# Patient Record
Sex: MALE | Race: WHITE | NOT HISPANIC OR LATINO | Employment: OTHER | URBAN - METROPOLITAN AREA
[De-identification: names, ages, dates, MRNs, and addresses within clinical notes are randomized per-mention and may not be internally consistent; named-entity substitution may affect disease eponyms.]

---

## 2017-04-21 ENCOUNTER — OFFICE VISIT (OUTPATIENT)
Dept: FAMILY MEDICINE | Facility: CLINIC | Age: 67
End: 2017-04-21
Payer: COMMERCIAL

## 2017-04-21 VITALS
WEIGHT: 197 LBS | HEART RATE: 64 BPM | HEIGHT: 70 IN | TEMPERATURE: 96.9 F | DIASTOLIC BLOOD PRESSURE: 70 MMHG | SYSTOLIC BLOOD PRESSURE: 124 MMHG | RESPIRATION RATE: 26 BRPM | BODY MASS INDEX: 28.2 KG/M2 | OXYGEN SATURATION: 97 %

## 2017-04-21 DIAGNOSIS — N40.1 BENIGN PROSTATIC HYPERPLASIA WITH LOWER URINARY TRACT SYMPTOMS, UNSPECIFIED MORPHOLOGY: ICD-10-CM

## 2017-04-21 DIAGNOSIS — Z12.11 COLON CANCER SCREENING: ICD-10-CM

## 2017-04-21 DIAGNOSIS — E78.5 HYPERLIPIDEMIA LDL GOAL <130: ICD-10-CM

## 2017-04-21 DIAGNOSIS — Z00.00 ROUTINE GENERAL MEDICAL EXAMINATION AT A HEALTH CARE FACILITY: Primary | ICD-10-CM

## 2017-04-21 PROCEDURE — 99386 PREV VISIT NEW AGE 40-64: CPT | Performed by: FAMILY MEDICINE

## 2017-04-21 RX ORDER — TAMSULOSIN HYDROCHLORIDE 0.4 MG/1
0.4 CAPSULE ORAL DAILY
Qty: 90 CAPSULE | Refills: 3 | Status: SHIPPED | OUTPATIENT
Start: 2017-04-21 | End: 2017-12-15

## 2017-04-21 RX ORDER — ATORVASTATIN CALCIUM 20 MG/1
20 TABLET, FILM COATED ORAL DAILY
Qty: 90 TABLET | Refills: 3 | Status: SHIPPED | OUTPATIENT
Start: 2017-04-21 | End: 2017-12-15

## 2017-04-21 NOTE — MR AVS SNAPSHOT
After Visit Summary   4/21/2017    Drake Santiago    MRN: [id number]           Patient Information     Date Of Birth          1950        Visit Information        Provider Department      4/21/2017 1:00 PM Christiano Braun MD [facility]        Today's Diagnoses     Colon cancer screening    -  1    Routine general medical examination at a health care facility        Hyperlipidemia LDL goal <130        Benign prostatic hyperplasia with lower urinary tract symptoms, unspecified morphology          Care Instructions      Preventive Health Recommendations  Male Ages 50 - 64    Yearly exam:             See your health care provider every year in order to  o   Review health changes.   o   Discuss preventive care.    o   Review your medicines if your doctor has prescribed any.     Have a cholesterol test every 5 years, or more frequently if you are at risk for high cholesterol/heart disease.     Have a diabetes test (fasting glucose) every three years. If you are at risk for diabetes, you should have this test more often.     Have a colonoscopy at age 50, or have a yearly FIT test (stool test). These exams will check for colon cancer.      Talk with your health care provider about whether or not a prostate cancer screening test (PSA) is right for you.    You should be tested each year for STDs (sexually transmitted diseases), if you re at risk.     Shots: Get a flu shot each year. Get a tetanus shot every 10 years.     Nutrition:    Eat at least 5 servings of fruits and vegetables daily.     Eat whole-grain bread, whole-wheat pasta and brown rice instead of white grains and rice.     Talk to your provider about Calcium and Vitamin D.     Lifestyle    Exercise for at least 150 minutes a week (30 minutes a day, 5 days a week). This will help you control your weight and prevent disease.     Limit alcohol to one drink per day.     No smoking.     Wear sunscreen to prevent skin cancer.  "    See your dentist every six months for an exam and cleaning.     See your eye doctor every 1 to 2 years.          Follow-ups after your visit        Future tests that were ordered for you today     Open Future Orders        Priority Expected Expires Ordered    Lipid Profile Routine  2017    Comprehensive metabolic panel (BMP + Alb, Alk Phos, ALT, AST, Total. Bili, TP) Routine  2017    Prostate spec antigen screen Routine  2017            Who to contact     If you have questions or need follow up information about today's clinic visit or your schedule please contact [facility] directly at [phone].  Normal or non-critical lab and imaging results will be communicated to you by [email or web address], letter or phone within 4 business days after the clinic has received the results. If you do not hear from us within 7 days, please contact the clinic through [email or web address] or phone. If you have a critical or abnormal lab result, we will notify you by phone as soon as possible.  Submit refill requests through [employer] or call your pharmacy and they will forward the refill request to us. Please allow 3 business days for your refill to be completed.          Additional Information About Your Visit        MyChart Information     Wet[employer]aint lets you send messages to your doctor, view your test results, renew your prescriptions, schedule appointments and more. To sign up, go to [email or web address] . Click on \"Log in\" on the left side of the screen, which will take you to the Welcome page. Then click on \"Sign up Now\" on the right side of the page.     You will be asked to enter the access code listed below, as well as some personal information. Please follow the directions to create your username and password.     Your access code is: [email or web address]  Expires: 2017  1:38 PM     Your access code will  in 90 days. If you need help or a new code, please call your [facility] " "clinic or [phone].        Care EveryWhere ID     This is your Care EveryWhere ID. This could be used by other organizations to access your [place] medical records  [id number]        Your Vitals Were     Pulse Temperature Respirations Height Pulse Oximetry BMI (Body Mass Index)    64 96.9  F (36.1  C) (Temporal) 26 5' 10.1\" (1.781 m) 97% 28.19 kg/m2       Blood Pressure from Last 3 Encounters:   04/21/17 124/70    Weight from Last 3 Encounters:   04/21/17 197 lb (89.4 kg)              We Performed the Following     Colonoscopy - HIM Scan          Today's Medication Changes          These changes are accurate as of: 4/21/17  1:38 PM.  If you have any questions, ask your nurse or doctor.               Start taking these medicines.        Dose/Directions    atorvastatin 20 MG tablet   Commonly known as:  LIPITOR   Used for:  Hyperlipidemia LDL goal <130   Started by:  Christiano Braun MD        Dose:  20 mg   Take 1 tablet (20 mg) by mouth daily   Quantity:  90 tablet   Refills:  3       tamsulosin 0.4 MG capsule   Commonly known as:  FLOMAX   Used for:  Benign prostatic hyperplasia with lower urinary tract symptoms, unspecified morphology   Started by:  Christiano Braun MD        Dose:  0.4 mg   Take 1 capsule (0.4 mg) by mouth daily   Quantity:  90 capsule   Refills:  3            Where to get your medicines      These medications were sent to [address]  [address], [facility] [address]     Phone:  [phone]     atorvastatin 20 MG tablet    tamsulosin 0.4 MG capsule                Primary Care Provider    None Specified       No primary provider on file.        Thank you!     Thank you for choosing [facility]  for your care. Our goal is always to provide you with excellent care. Hearing back from our patients is one way we can continue to improve our services. Please take a few minutes to complete the written survey that you may receive in the mail " after your visit with us. Thank you!             Your Updated Medication List - Protect others around you: Learn how to safely use, store and throw away your medicines at www.disposemymeds.org.          This list is accurate as of: 4/21/17  1:38 PM.  Always use your most recent med list.                   Brand Name Dispense Instructions for use    atorvastatin 20 MG tablet    LIPITOR    90 tablet    Take 1 tablet (20 mg) by mouth daily       tamsulosin 0.4 MG capsule    FLOMAX    90 capsule    Take 1 capsule (0.4 mg) by mouth daily

## 2017-04-21 NOTE — NURSING NOTE
"Chief Complaint   Patient presents with     Establish Care     Physical       Initial /70  Pulse 64  Temp 96.9  F (36.1  C) (Temporal)  Resp 26  Ht 5' 10.1\" (1.781 m)  Wt 197 lb (89.4 kg)  SpO2 97%  BMI 28.19 kg/m2 Estimated body mass index is 28.19 kg/(m^2) as calculated from the following:    Height as of this encounter: 5' 10.1\" (1.781 m).    Weight as of this encounter: 197 lb (89.4 kg).  Medication Reconciliation: complete    "

## 2017-04-21 NOTE — PROGRESS NOTES
SUBJECTIVE:     CC: Drake Santiago is an 66 year old male who presents for preventative health visit.     Healthy Habits:    Do you get at least three servings of calcium containing foods daily (dairy, green leafy vegetables, etc.)? No    Amount of exercise or daily activities, outside of work: 0 day(s) per week    Problems taking medications regularly No    Medication side effects: No    Have you had an eye exam in the past two years? yes    Do you see a dentist twice per year? yes    Do you have sleep apnea, excessive snoring or daytime drowsiness?no        Medication refills    Today's PHQ-2 Score: No flowsheet data found.    Abuse: Current or Past(Physical, Sexual or Emotional)- No  Do you feel safe in your environment - Yes    Social History   Substance Use Topics     Smoking status: Never Smoker     Smokeless tobacco: Never Used     Alcohol use No     The patient does not drink >3 drinks per day nor >7 drinks per week.    Last PSA: No results found for: PSA    No results for input(s): CHOL, HDL, LDL, TRIG, CHOLHDLRATIO, NHDL in the last 02199 hours.    Reviewed orders with patient. Reviewed health maintenance and updated orders accordingly - Yes    Reviewed and updated as needed this visit by clinical staff  Tobacco  Allergies  Meds  Soc Hx        Reviewed and updated as needed this visit by Provider        Past Medical History:   Diagnosis Date     BPH (benign prostatic hyperplasia)      Hyperlipidemia       Past Surgical History:   Procedure Laterality Date     HERNIORRHAPHY INGUINAL         ROS:  C: NEGATIVE for fever, chills, change in weight  I: NEGATIVE for worrisome rashes, moles or lesions  E: NEGATIVE for vision changes or irritation  ENT: NEGATIVE for ear, mouth and throat problems  R: NEGATIVE for significant cough or SOB  CV: NEGATIVE for chest pain, palpitations or peripheral edema  GI: NEGATIVE for nausea, abdominal pain, heartburn, or change in bowel habits   male: negative for dysuria,  "hematuria, decreased urinary stream, erectile dysfunction, urethral discharge  M: NEGATIVE for significant arthralgias or myalgia  N: NEGATIVE for weakness, dizziness or paresthesias  P: NEGATIVE for changes in mood or affect      OBJECTIVE:     /70  Pulse 64  Temp 96.9  F (36.1  C) (Temporal)  Resp 26  Ht 5' 10.1\" (1.781 m)  Wt 197 lb (89.4 kg)  SpO2 97%  BMI 28.19 kg/m2  EXAM:  GENERAL: healthy, alert and no distress  EYES: Eyes grossly normal to inspection, PERRL and conjunctivae and sclerae normal  HENT: ear canals and TM's normal, nose and mouth without ulcers or lesions  NECK: no adenopathy, no asymmetry, masses, or scars and thyroid normal to palpation  RESP: lungs clear to auscultation - no rales, rhonchi or wheezes  CV: regular rate and rhythm, normal S1 S2, no S3 or S4, no murmur, click or rub, no peripheral edema and peripheral pulses strong  ABDOMEN: soft, nontender, no hepatosplenomegaly, no masses and bowel sounds normal  MS: no gross musculoskeletal defects noted, no edema  SKIN: no suspicious lesions or rashes  NEURO: Normal strength and tone, mentation intact and speech normal  PSYCH: mentation appears normal, affect normal/bright    ASSESSMENT/PLAN:     1. Routine general medical examination at a health care facility  Generally healthy moved to the area recently. We are obtaining old records.    2. Hyperlipidemia LDL goal <130  He is due for lipid panel he will come in and get this in the near future fasting.  - atorvastatin (LIPITOR) 20 MG tablet; Take 1 tablet (20 mg) by mouth daily  Dispense: 90 tablet; Refill: 3  - Lipid Profile; Future  - Comprehensive metabolic panel (BMP + Alb, Alk Phos, ALT, AST, Total. Bili, TP); Future    3. Benign prostatic hyperplasia with lower urinary tract symptoms, unspecified morphology  We will follow his PSA. He is sometimes taking 2 Flomax a day and if it gets to be more he would like to see urology.  - tamsulosin (FLOMAX) 0.4 MG capsule; Take 1 " "capsule (0.4 mg) by mouth daily  Dispense: 90 capsule; Refill: 3  - Prostate spec antigen screen; Future    4. Colon cancer screening  Recently had this.  - Colonoscopy - HIM Scan    COUNSELING:  Reviewed preventive health counseling, as reflected in patient instructions       Regular exercise       Healthy diet/nutrition       Vision screening         reports that he has never smoked. He has never used smokeless tobacco.    Estimated body mass index is 28.19 kg/(m^2) as calculated from the following:    Height as of this encounter: 5' 10.1\" (1.781 m).    Weight as of this encounter: 197 lb (89.4 kg).       Counseling Resources:  ATP IV Guidelines  Pooled Cohorts Equation Calculator  FRAX Risk Assessment  ICSI Preventive Guidelines  Dietary Guidelines for Americans, 2010  USDA's MyPlate  ASA Prophylaxis  Lung CA Screening    Christiano Braun MD  [facility]  "

## 2017-04-24 DIAGNOSIS — E78.5 HYPERLIPIDEMIA LDL GOAL <130: ICD-10-CM

## 2017-04-24 DIAGNOSIS — N40.1 BENIGN PROSTATIC HYPERPLASIA WITH LOWER URINARY TRACT SYMPTOMS, UNSPECIFIED MORPHOLOGY: ICD-10-CM

## 2017-04-24 LAB
ALBUMIN SERPL-MCNC: 3.6 G/DL (ref 3.4–5)
ALP SERPL-CCNC: 79 U/L (ref 40–150)
ALT SERPL W P-5'-P-CCNC: 26 U/L (ref 0–70)
ANION GAP SERPL CALCULATED.3IONS-SCNC: 10 MMOL/L (ref 3–14)
AST SERPL W P-5'-P-CCNC: 17 U/L (ref 0–45)
BILIRUB SERPL-MCNC: 0.4 MG/DL (ref 0.2–1.3)
BUN SERPL-MCNC: 21 MG/DL (ref 7–30)
CALCIUM SERPL-MCNC: 9.1 MG/DL (ref 8.5–10.1)
CHLORIDE SERPL-SCNC: 105 MMOL/L (ref 94–109)
CHOLEST SERPL-MCNC: 130 MG/DL
CO2 SERPL-SCNC: 28 MMOL/L (ref 20–32)
CREAT SERPL-MCNC: 0.89 MG/DL (ref 0.66–1.25)
GFR SERPL CREATININE-BSD FRML MDRD: 86 ML/MIN/1.7M2
GLUCOSE SERPL-MCNC: 111 MG/DL (ref 70–99)
HDLC SERPL-MCNC: 49 MG/DL
LDLC SERPL CALC-MCNC: 60 MG/DL
NONHDLC SERPL-MCNC: 81 MG/DL
POTASSIUM SERPL-SCNC: 3.9 MMOL/L (ref 3.4–5.3)
PROT SERPL-MCNC: 7.1 G/DL (ref 6.8–8.8)
PSA SERPL-ACNC: 1.32 UG/L (ref 0–4)
SODIUM SERPL-SCNC: 143 MMOL/L (ref 133–144)
TRIGL SERPL-MCNC: 103 MG/DL

## 2017-04-24 PROCEDURE — G0103 PSA SCREENING: HCPCS | Performed by: FAMILY MEDICINE

## 2017-04-24 PROCEDURE — 80061 LIPID PANEL: CPT | Performed by: FAMILY MEDICINE

## 2017-04-24 PROCEDURE — 80053 COMPREHEN METABOLIC PANEL: CPT | Performed by: FAMILY MEDICINE

## 2017-04-24 PROCEDURE — 36415 COLL VENOUS BLD VENIPUNCTURE: CPT | Performed by: FAMILY MEDICINE

## 2017-04-24 NOTE — LETTER
[address] [address]  Phone: [phone]          May 8, 2017    Drake Jack  [address] [address] [place] [id number]          Dear Drake,      LAB RESULTS:     The results of your recent labs were NORMAL. Your blood sugar was borderline elevated. Watch the carbohydrates in your diet to help keep this number down. If you have any further questions or problems, please contact our office.          Sincerely,      Christiano Braun M.D.

## 2017-04-26 ENCOUNTER — MYC MEDICAL ADVICE (OUTPATIENT)
Dept: FAMILY MEDICINE | Facility: CLINIC | Age: 67
End: 2017-04-26

## 2017-04-27 ENCOUNTER — MYC REFILL (OUTPATIENT)
Dept: FAMILY MEDICINE | Facility: CLINIC | Age: 67
End: 2017-04-27

## 2017-04-27 DIAGNOSIS — N40.1 BENIGN PROSTATIC HYPERPLASIA WITH LOWER URINARY TRACT SYMPTOMS, UNSPECIFIED MORPHOLOGY: ICD-10-CM

## 2017-04-27 DIAGNOSIS — E78.5 HYPERLIPIDEMIA LDL GOAL <130: ICD-10-CM

## 2017-04-27 RX ORDER — TAMSULOSIN HYDROCHLORIDE 0.4 MG/1
0.4 CAPSULE ORAL DAILY
Qty: 90 CAPSULE | Refills: 3 | Status: CANCELLED | OUTPATIENT
Start: 2017-04-27

## 2017-04-27 RX ORDER — ATORVASTATIN CALCIUM 20 MG/1
20 TABLET, FILM COATED ORAL DAILY
Qty: 90 TABLET | Refills: 3 | Status: CANCELLED | OUTPATIENT
Start: 2017-04-27

## 2017-04-27 NOTE — TELEPHONE ENCOUNTER
Message from [employer]:  Original authorizing provider: Christiano Braun MD    Drake Santiago would like a refill of the following medications:  tamsulosin (FLOMAX) 0.4 MG capsule [Christiano Braun MD]  atorvastatin (LIPITOR) 20 MG tablet [Christiano Braun MD]    Preferred pharmacy: [facility] [address]    Comment:

## 2017-05-08 NOTE — PROGRESS NOTES
Cholesterol good at 130 blood sugar was borderline at 111. Remainder of chemistry panel is normal PSA was fine at 1.32

## 2017-06-05 ENCOUNTER — HOSPITAL ENCOUNTER (OUTPATIENT)
Facility: AMBULATORY SURGERY CENTER | Age: 67
End: 2017-06-05
Attending: OPHTHALMOLOGY

## 2017-06-05 ENCOUNTER — HOSPITAL ENCOUNTER (EMERGENCY)
Facility: CLINIC | Age: 67
Discharge: HOME | End: 2017-06-05
Attending: EMERGENCY MEDICINE | Admitting: EMERGENCY MEDICINE
Payer: COMMERCIAL

## 2017-06-05 ENCOUNTER — HOSPITAL ENCOUNTER (EMERGENCY)
Facility: CLINIC | Age: 67
Discharge: ANOTHER HOSPITAL | End: 2017-06-05
Attending: FAMILY MEDICINE | Admitting: FAMILY MEDICINE
Payer: COMMERCIAL

## 2017-06-05 ENCOUNTER — ANESTHESIA (OUTPATIENT)
Dept: SURGERY | Facility: AMBULATORY SURGERY CENTER | Age: 67
End: 2017-06-05

## 2017-06-05 ENCOUNTER — OFFICE VISIT (OUTPATIENT)
Dept: OPHTHALMOLOGY | Facility: CLINIC | Age: 67
End: 2017-06-05

## 2017-06-05 ENCOUNTER — SURGERY (OUTPATIENT)
Age: 67
End: 2017-06-05

## 2017-06-05 ENCOUNTER — ANESTHESIA EVENT (OUTPATIENT)
Dept: SURGERY | Facility: AMBULATORY SURGERY CENTER | Age: 67
End: 2017-06-05

## 2017-06-05 VITALS
RESPIRATION RATE: 16 BRPM | SYSTOLIC BLOOD PRESSURE: 149 MMHG | WEIGHT: 195 LBS | BODY MASS INDEX: 27.92 KG/M2 | DIASTOLIC BLOOD PRESSURE: 90 MMHG | HEART RATE: 64 BPM | TEMPERATURE: 97 F | HEIGHT: 70 IN | OXYGEN SATURATION: 99 %

## 2017-06-05 VITALS
HEIGHT: 70 IN | RESPIRATION RATE: 16 BRPM | DIASTOLIC BLOOD PRESSURE: 92 MMHG | BODY MASS INDEX: 27.92 KG/M2 | OXYGEN SATURATION: 97 % | TEMPERATURE: 97.2 F | WEIGHT: 195 LBS | HEART RATE: 53 BPM | SYSTOLIC BLOOD PRESSURE: 138 MMHG

## 2017-06-05 VITALS — BODY MASS INDEX: 27.92 KG/M2 | WEIGHT: 195 LBS | HEIGHT: 70 IN

## 2017-06-05 VITALS
DIASTOLIC BLOOD PRESSURE: 94 MMHG | OXYGEN SATURATION: 100 % | SYSTOLIC BLOOD PRESSURE: 138 MMHG | RESPIRATION RATE: 18 BRPM

## 2017-06-05 DIAGNOSIS — S01.111A EYELID LACERATION, RIGHT, INITIAL ENCOUNTER: ICD-10-CM

## 2017-06-05 DIAGNOSIS — S01.119A: Primary | ICD-10-CM
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
## 2017-06-05 DIAGNOSIS — S01.111A: ICD-10-CM
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
## 2017-06-05 DIAGNOSIS — Z98.890 POSTOPERATIVE STATE: Primary | ICD-10-CM

## 2017-06-05 PROCEDURE — 90715 TDAP VACCINE 7 YRS/> IM: CPT | Performed by: FAMILY MEDICINE

## 2017-06-05 PROCEDURE — 99283 EMERGENCY DEPT VISIT LOW MDM: CPT | Mod: Z6 | Performed by: FAMILY MEDICINE

## 2017-06-05 PROCEDURE — 99285 EMERGENCY DEPT VISIT HI MDM: CPT | Performed by: EMERGENCY MEDICINE

## 2017-06-05 PROCEDURE — 25000125 ZZHC RX 250: Performed by: FAMILY MEDICINE

## 2017-06-05 PROCEDURE — 99285 EMERGENCY DEPT VISIT HI MDM: CPT | Mod: 25 | Performed by: FAMILY MEDICINE

## 2017-06-05 PROCEDURE — 90471 IMMUNIZATION ADMIN: CPT | Performed by: FAMILY MEDICINE

## 2017-06-05 PROCEDURE — 99284 EMERGENCY DEPT VISIT MOD MDM: CPT | Mod: Z6 | Performed by: EMERGENCY MEDICINE

## 2017-06-05 DEVICE — EYE STENT LACRIMAL CRAWFORD 28-0184: Type: IMPLANTABLE DEVICE | Site: EYE | Status: FUNCTIONAL

## 2017-06-05 RX ORDER — FENTANYL CITRATE 50 UG/ML
25-50 INJECTION, SOLUTION INTRAMUSCULAR; INTRAVENOUS
Status: DISCONTINUED | OUTPATIENT
Start: 2017-06-05 | End: 2017-06-05 | Stop reason: HOSPADM

## 2017-06-05 RX ORDER — SODIUM CHLORIDE, SODIUM LACTATE, POTASSIUM CHLORIDE, CALCIUM CHLORIDE 600; 310; 30; 20 MG/100ML; MG/100ML; MG/100ML; MG/100ML
INJECTION, SOLUTION INTRAVENOUS CONTINUOUS PRN
Status: DISCONTINUED | OUTPATIENT
Start: 2017-06-05 | End: 2017-06-05

## 2017-06-05 RX ORDER — ERYTHROMYCIN 5 MG/G
1 OINTMENT OPHTHALMIC 3 TIMES DAILY
Qty: 3.5 G | Refills: 0 | Status: SHIPPED | OUTPATIENT
Start: 2017-06-05 | End: 2017-06-12

## 2017-06-05 RX ORDER — PROPOFOL 10 MG/ML
INJECTION, EMULSION INTRAVENOUS CONTINUOUS PRN
Status: DISCONTINUED | OUTPATIENT
Start: 2017-06-05 | End: 2017-06-05

## 2017-06-05 RX ORDER — HYDROCODONE BITARTRATE AND ACETAMINOPHEN 5; 325 MG/1; MG/1
1 TABLET ORAL EVERY 6 HOURS PRN
Qty: 10 TABLET | Refills: 0 | Status: SHIPPED | OUTPATIENT
Start: 2017-06-05 | End: 2019-01-03

## 2017-06-05 RX ORDER — CLINDAMYCIN HCL 150 MG
150 CAPSULE ORAL 3 TIMES DAILY
Qty: 30 CAPSULE | Refills: 0 | Status: SHIPPED | OUTPATIENT
Start: 2017-06-05 | End: 2018-08-01

## 2017-06-05 RX ORDER — TETRACAINE HYDROCHLORIDE 5 MG/ML
SOLUTION OPHTHALMIC PRN
Status: DISCONTINUED | OUTPATIENT
Start: 2017-06-05 | End: 2017-06-05 | Stop reason: HOSPADM

## 2017-06-05 RX ORDER — BUPIVACAINE HYDROCHLORIDE 5 MG/ML
INJECTION, SOLUTION PERINEURAL PRN
Status: DISCONTINUED | OUTPATIENT
Start: 2017-06-05 | End: 2017-06-05 | Stop reason: HOSPADM

## 2017-06-05 RX ORDER — LIDOCAINE 40 MG/G
CREAM TOPICAL
Status: DISCONTINUED | OUTPATIENT
Start: 2017-06-05 | End: 2017-06-05 | Stop reason: HOSPADM

## 2017-06-05 RX ORDER — SODIUM CHLORIDE, SODIUM LACTATE, POTASSIUM CHLORIDE, CALCIUM CHLORIDE 600; 310; 30; 20 MG/100ML; MG/100ML; MG/100ML; MG/100ML
INJECTION, SOLUTION INTRAVENOUS CONTINUOUS
Status: DISCONTINUED | OUTPATIENT
Start: 2017-06-05 | End: 2017-06-06 | Stop reason: HOSPADM

## 2017-06-05 RX ORDER — SODIUM CHLORIDE, SODIUM LACTATE, POTASSIUM CHLORIDE, CALCIUM CHLORIDE 600; 310; 30; 20 MG/100ML; MG/100ML; MG/100ML; MG/100ML
INJECTION, SOLUTION INTRAVENOUS CONTINUOUS
Status: DISCONTINUED | OUTPATIENT
Start: 2017-06-05 | End: 2017-06-05 | Stop reason: HOSPADM

## 2017-06-05 RX ORDER — ONDANSETRON 4 MG/1
4 TABLET, ORALLY DISINTEGRATING ORAL EVERY 30 MIN PRN
Status: DISCONTINUED | OUTPATIENT
Start: 2017-06-05 | End: 2017-06-06 | Stop reason: HOSPADM

## 2017-06-05 RX ORDER — NEOMYCIN SULFATE, POLYMYXIN B SULFATE AND DEXAMETHASONE 3.5; 10000; 1 MG/ML; [USP'U]/ML; MG/ML
1 SUSPENSION/ DROPS OPHTHALMIC 4 TIMES DAILY
Qty: 1 BOTTLE | Refills: 0 | Status: SHIPPED | OUTPATIENT
Start: 2017-06-05 | End: 2018-08-01

## 2017-06-05 RX ORDER — ERYTHROMYCIN 5 MG/G
OINTMENT OPHTHALMIC PRN
Status: DISCONTINUED | OUTPATIENT
Start: 2017-06-05 | End: 2017-06-05 | Stop reason: HOSPADM

## 2017-06-05 RX ORDER — ONDANSETRON 2 MG/ML
INJECTION INTRAMUSCULAR; INTRAVENOUS PRN
Status: DISCONTINUED | OUTPATIENT
Start: 2017-06-05 | End: 2017-06-05

## 2017-06-05 RX ORDER — HYDROCODONE BITARTRATE AND ACETAMINOPHEN 5; 325 MG/1; MG/1
1 TABLET ORAL ONCE
Status: COMPLETED | OUTPATIENT
Start: 2017-06-05 | End: 2017-06-05

## 2017-06-05 RX ORDER — NALOXONE HYDROCHLORIDE 0.4 MG/ML
.1-.4 INJECTION, SOLUTION INTRAMUSCULAR; INTRAVENOUS; SUBCUTANEOUS
Status: DISCONTINUED | OUTPATIENT
Start: 2017-06-05 | End: 2017-06-06 | Stop reason: HOSPADM

## 2017-06-05 RX ORDER — ACETAMINOPHEN 500 MG
1000 TABLET ORAL ONCE
Status: COMPLETED | OUTPATIENT
Start: 2017-06-05 | End: 2017-06-05

## 2017-06-05 RX ORDER — ONDANSETRON 2 MG/ML
4 INJECTION INTRAMUSCULAR; INTRAVENOUS EVERY 30 MIN PRN
Status: DISCONTINUED | OUTPATIENT
Start: 2017-06-05 | End: 2017-06-06 | Stop reason: HOSPADM

## 2017-06-05 RX ORDER — PROPOFOL 10 MG/ML
INJECTION, EMULSION INTRAVENOUS PRN
Status: DISCONTINUED | OUTPATIENT
Start: 2017-06-05 | End: 2017-06-05

## 2017-06-05 RX ORDER — ERYTHROMYCIN 5 MG/G
OINTMENT OPHTHALMIC ONCE
Status: DISCONTINUED | OUTPATIENT
Start: 2017-06-05 | End: 2017-06-05 | Stop reason: HOSPADM

## 2017-06-05 RX ORDER — PROPARACAINE HYDROCHLORIDE 5 MG/ML
SOLUTION/ DROPS OPHTHALMIC
Status: DISCONTINUED
Start: 2017-06-05 | End: 2017-06-05 | Stop reason: HOSPADM

## 2017-06-05 RX ORDER — ACETAMINOPHEN 325 MG/1
975 TABLET ORAL ONCE
Status: DISCONTINUED | OUTPATIENT
Start: 2017-06-05 | End: 2017-06-06 | Stop reason: HOSPADM

## 2017-06-05 RX ADMIN — Medication 1000 MG: at 12:28

## 2017-06-05 RX ADMIN — BUPIVACAINE HYDROCHLORIDE 3.25 ML: 5 INJECTION, SOLUTION PERINEURAL at 13:05

## 2017-06-05 RX ADMIN — SODIUM CHLORIDE, SODIUM LACTATE, POTASSIUM CHLORIDE, CALCIUM CHLORIDE: 600; 310; 30; 20 INJECTION, SOLUTION INTRAVENOUS at 12:15

## 2017-06-05 RX ADMIN — PROPOFOL 50 MG: 10 INJECTION, EMULSION INTRAVENOUS at 12:33

## 2017-06-05 RX ADMIN — TETRACAINE HYDROCHLORIDE 2 DROP: 5 SOLUTION OPHTHALMIC at 13:03

## 2017-06-05 RX ADMIN — ONDANSETRON 4 MG: 2 INJECTION INTRAMUSCULAR; INTRAVENOUS at 12:33

## 2017-06-05 RX ADMIN — CLOSTRIDIUM TETANI TOXOID ANTIGEN (FORMALDEHYDE INACTIVATED), CORYNEBACTERIUM DIPHTHERIAE TOXOID ANTIGEN (FORMALDEHYDE INACTIVATED), BORDETELLA PERTUSSIS TOXOID ANTIGEN (GLUTARALDEHYDE INACTIVATED), BORDETELLA PERTUSSIS FILAMENTOUS HEMAGGLUTININ ANTIGEN (FORMALDEHYDE INACTIVATED), BORDETELLA PERTUSSIS PERTACTIN ANTIGEN, AND BORDETELLA PERTUSSIS FIMBRIAE 2/3 ANTIGEN 0.5 ML: 5; 2; 2.5; 5; 3; 5 INJECTION, SUSPENSION INTRAMUSCULAR at 02:03

## 2017-06-05 RX ADMIN — PROPOFOL 40 MG: 10 INJECTION, EMULSION INTRAVENOUS at 12:48

## 2017-06-05 RX ADMIN — PROPOFOL 30 MG: 10 INJECTION, EMULSION INTRAVENOUS at 12:49

## 2017-06-05 RX ADMIN — ERYTHROMYCIN 2 INCH: 5 OINTMENT OPHTHALMIC at 13:25

## 2017-06-05 RX ADMIN — HYDROCODONE BITARTRATE AND ACETAMINOPHEN 1 TABLET: 5; 325 TABLET ORAL at 13:43

## 2017-06-05 RX ADMIN — PROPOFOL 100 MCG/KG/MIN: 10 INJECTION, EMULSION INTRAVENOUS at 12:33

## 2017-06-05 ASSESSMENT — CONF VISUAL FIELD
METHOD: COUNTING FINGERS
OS_NORMAL: 1
OD_NORMAL: 1

## 2017-06-05 ASSESSMENT — VISUAL ACUITY
OD_CC: 20/20
CORRECTION_TYPE: GLASSES
OS: 20/20
METHOD: SNELLEN - LINEAR
OS_CC: 20/15
OD: 20/25

## 2017-06-05 ASSESSMENT — SLIT LAMP EXAM - LIDS: COMMENTS: NORMAL

## 2017-06-05 ASSESSMENT — ENCOUNTER SYMPTOMS
CHEST TIGHTNESS: 0
SHORTNESS OF BREATH: 0
STRIDOR: 0
WHEEZING: 0
DIAPHORESIS: 0
CHOKING: 0
FATIGUE: 0
COUGH: 0
CHILLS: 0
FEVER: 0
UNEXPECTED WEIGHT CHANGE: 0
BACK PAIN: 0

## 2017-06-05 ASSESSMENT — EXTERNAL EXAM - LEFT EYE: OS_EXAM: NORMAL

## 2017-06-05 ASSESSMENT — TONOMETRY
IOP_METHOD: TONOPEN
OS_IOP_MMHG: 15
OD_IOP_MMHG: 14

## 2017-06-05 ASSESSMENT — CUP TO DISC RATIO: OD_RATIO: 0.35

## 2017-06-05 NOTE — ANESTHESIA CARE TRANSFER NOTE
Patient: Drake Santiago    Procedure(s):  right lower lid laceration repair, right canalicular laceration repair       - Wound Class: I-Clean    Diagnosis: Lid Laceration, right lower  Diagnosis Additional Information: No value filed.    Anesthesia Type:   MAC     Note:  Airway :Room Air  Patient transferred to:Phase II  Comments: Arrive Phase II, Stable, Airway Intact  119/77, 66,16,96,97.6  All questions answered.      Vitals: (Last set prior to Anesthesia Care Transfer)    CRNA VITALS  6/5/2017 1254 - 6/5/2017 1327      6/5/2017             Pulse: 60    SpO2: 98 %    Resp Rate (set): 10                Electronically Signed By: DEA Nowak CRNA  June 5, 2017  1:27 PM

## 2017-06-05 NOTE — ANESTHESIA PREPROCEDURE EVALUATION
Physical Exam  Normal systems: pulmonary    Airway   Mallampati: II  TM distance: >3 FB  Neck ROM: full    Dental   (+) implants    Cardiovascular   Rhythm and rate: regular      Pulmonary                     Anesthesia Plan      History & Physical Review  History and physical reviewed and following examination; no interval change.    ASA Status:  2 .    NPO Status:  > 8 hours    Plan for MAC with Intravenous induction. Reason for MAC:  Procedure to face, neck, head or breast  PONV prophylaxis:  Ondansetron (or other 5HT-3)       Postoperative Care  Postoperative pain management:  Multi-modal analgesia.      Consents  Anesthetic plan, risks, benefits and alternatives discussed with:  Patient..            Anesthesia Pre-op Note    Drake Santiago is a 66 year old male with past medical as described below is scheduled for Procedure(s):  right lower lid laceration repair, right canalicular laceration repair possible forehead laceartion repair       - Wound Class: I-Clean   - Wound Class: I-Clean    Past Medical History:   Diagnosis Date     BPH (benign prostatic hyperplasia)      Hyperlipidemia      Past Surgical History:   Procedure Laterality Date     HERNIORRHAPHY INGUINAL       Family History   Problem Relation Age of Onset     Glaucoma No family hx of      Macular Degeneration No family hx of      Social History     Social History     Marital status: Single     Spouse name: N/A     Number of children: N/A     Years of education: N/A     Occupational History     Not on file.     Social History Main Topics     Smoking status: Former Smoker     Smokeless tobacco: Never Used     Alcohol use No     Drug use: No     Sexual activity: Not Currently     Partners: Female     Other Topics Concern     Not on file     Social History Narrative     Current Outpatient Prescriptions   Medication Sig Dispense Refill     IBUPROFEN PO        DIPHENHYDRAMINE HCL PO Take 50 mg by mouth nightly as needed       erythromycin  (ROMYCIN) ophthalmic ointment Apply 1 Application to eye 3 times daily for 7 days Apply small amount to incision sites, as directed per physician instructions. 3.5 g 0     HYDROcodone-acetaminophen (NORCO) 5-325 MG per tablet Take 1 tablet by mouth every 6 hours as needed for pain Maximum of 4000 mg of acetaminophen in 24 hours. 10 tablet 0     neomycin-polymyxin-dexamethasone (MAXITROL) 3.5-24038-3.1 SUSP ophthalmic susp Place 1 drop into the right eye 4 times daily 1 Bottle 0     clindamycin (CLEOCIN) 150 MG capsule Take 1 capsule (150 mg) by mouth 3 times daily 30 capsule 0     tamsulosin (FLOMAX) 0.4 MG capsule Take 1 capsule (0.4 mg) by mouth daily 90 capsule 3     atorvastatin (LIPITOR) 20 MG tablet Take 1 tablet (20 mg) by mouth daily 90 tablet 3     Current Outpatient Prescriptions   Medication     IBUPROFEN PO     DIPHENHYDRAMINE HCL PO     erythromycin (ROMYCIN) ophthalmic ointment     HYDROcodone-acetaminophen (NORCO) 5-325 MG per tablet     neomycin-polymyxin-dexamethasone (MAXITROL) 3.5-45464-6.1 SUSP ophthalmic susp     clindamycin (CLEOCIN) 150 MG capsule     tamsulosin (FLOMAX) 0.4 MG capsule     atorvastatin (LIPITOR) 20 MG tablet     Current Facility-Administered Medications   Medication     lidocaine 1 % 1 mL     lidocaine (LMX4) kit     sodium chloride (PF) 0.9% PF flush 3 mL     sodium chloride (PF) 0.9% PF flush 3 mL     lactated ringers infusion     acetaminophen (TYLENOL) tablet 975 mg     Facility-Administered Medications Ordered in Other Encounters   Medication     lactated ringers infusion        Allergies   Allergen Reactions     Bees Anaphylaxis     Penicillins Anaphylaxis         Lab:     CBC RESULTS: No results for input(s): WBC, RBC, HGB, HCT, MCV, MCH, MCHC, RDW, PLT in the last 38714 hours.  Recent Labs   Lab Test  04/24/17   0922   NA  143   POTASSIUM  3.9   CHLORIDE  105   CO2  28   ANIONGAP  10   GLC  111*   BUN  21   CR  0.89   BERTRAND  9.1     The laboratory has evaluated your INR  and PTT and the results are as listed below.    No results found for: INR  No results found for: PTT

## 2017-06-05 NOTE — ED NOTES
Patient awoke this tonight to use restroom; while walking to restroom patient tripped over a dog and striking his face on the corner of a dresser sustaining a large laceration through the bottom right eyelid into cheek.  Patient denies LOC, Neck Pain, Nausea / Vomiting.  Patient denies visual changes - right eye.  States feels swollen.

## 2017-06-05 NOTE — ED AVS SNAPSHOT
[facility], Emergency Department    [address] [address]    Phone:  [phone]                                       Drake Santiago   MRN: [id number]    Department:  [facility], Emergency Department   Date of Visit:  6/5/2017           Patient Information     Date Of Birth          1950        Your diagnoses for this visit were:     Eyelid laceration, right, initial encounter        You were seen by Jeb Freeman MD.        Discharge Instructions       [place] [facility] [place]ulopastics appointment at 730 am.     Future Appointments        Provider Department Dept Phone Center    6/5/2017 7:30 AM Talmage J. Broadbent, MD [facility] Ophthalmology [phone] [facility]      24 Hour Appointment Hotline       To make an appointment at any [facility], call [phone]). If you don't have a family doctor or clinic, we will help you find one. [facility] clinics are conveniently located to serve the needs of you and your family.             Review of your medicines      Our records show that you are taking the medicines listed below. If these are incorrect, please call your family doctor or clinic.        Dose / Directions Last dose taken    atorvastatin 20 MG tablet   Commonly known as:  LIPITOR   Dose:  20 mg   Quantity:  90 tablet        Take 1 tablet (20 mg) by mouth daily   Refills:  3        DIPHENHYDRAMINE HCL PO   Dose:  50 mg        Take 50 mg by mouth nightly as needed   Refills:  0        IBUPROFEN PO        Refills:  0        tamsulosin 0.4 MG capsule   Commonly known as:  FLOMAX   Dose:  0.4 mg   Quantity:  90 capsule        Take 1 capsule (0.4 mg) by mouth daily   Refills:  3                Orders Needing Specimen Collection     None      Pending Results     No orders found from 6/3/2017 to 6/6/2017.            Pending Culture Results     No orders found from 6/3/2017 to 6/6/2017.            Pending Results Instructions     If  you had any lab results that were not finalized at the time of your Discharge, you can call the ED Lab Result RN at [phone]. You will be contacted by this team for any positive Lab results or changes in treatment. The nurses are available 7 days a week from 10A to 6:30P.  You can leave a message 24 hours per day and they will return your call.        Thank you for choosing [facility]       Thank you for choosing [facility] for your care. Our goal is always to provide you with excellent care. Hearing back from our patients is one way we can continue to improve our services. Please take a few minutes to complete the written survey that you may receive in the mail after you visit with us. Thank you!        [employer] Information     Back[employer] gives you secure access to your electronic health record. If you see a primary care provider, you can also send messages to your care team and make appointments. If you have questions, please call your primary care clinic.  If you do not have a primary care provider, please call [phone] and they will assist you.        Care EveryWhere ID     This is your Care EveryWhere ID. This could be used by other organizations to access your [facility] medical records  [id number]        After Visit Summary       This is your record. Keep this with you and show to your community pharmacist(s) and doctor(s) at your next visit.

## 2017-06-05 NOTE — ED PROVIDER NOTES
History     Chief Complaint   Patient presents with     Eye Injury     rt     HPI  Drake Santiago is a 66 year old male who was transferred from an outside facility with a right eyelid laceration.  He tripped on his dog and hit his head on the corner of there dresser.  He had no loss of consciousness.  He has no neck pain.  He has had no vomiting.  He has no vision changes.  He is unsure of his tetanus.  He wears glasses but was not wearing them at the time of the accident.  He does not wear contacts.       PAST MEDICAL HISTORY:   Past Medical History:   Diagnosis Date     BPH (benign prostatic hyperplasia)      Hyperlipidemia        PAST SURGICAL HISTORY:   Past Surgical History:   Procedure Laterality Date     HERNIORRHAPHY INGUINAL         FAMILY HISTORY: No family history on file.    SOCIAL HISTORY:   Social History   Substance Use Topics     Smoking status: Former Smoker     Smokeless tobacco: Never Used     Alcohol use No       Patient's Medications   New Prescriptions    No medications on file   Previous Medications    ATORVASTATIN (LIPITOR) 20 MG TABLET    Take 1 tablet (20 mg) by mouth daily    DIPHENHYDRAMINE HCL PO    Take 50 mg by mouth nightly as needed    IBUPROFEN PO        TAMSULOSIN (FLOMAX) 0.4 MG CAPSULE    Take 1 capsule (0.4 mg) by mouth daily   Modified Medications    No medications on file   Discontinued Medications    No medications on file          Allergies   Allergen Reactions     Bees Anaphylaxis     Penicillins Anaphylaxis          I have reviewed the Medications, Allergies, Past Medical and Surgical History, and Social History in the Epic system.    Review of Systems   Constitutional: Negative for chills, diaphoresis, fatigue, fever and unexpected weight change.   Respiratory: Negative for cough, choking, chest tightness, shortness of breath, wheezing and stridor.    Cardiovascular: Negative for chest pain.   Musculoskeletal: Negative for back pain.   All other systems reviewed and  "are negative.      Physical Exam   BP: (!) 147/98  Pulse: 64  Temp: 97  F (36.1  C)  Resp: 14  Height: 177.8 cm (5' 10\")  Weight: 88.5 kg (195 lb)  SpO2: 99 %  Physical Exam   Constitutional: He is oriented to person, place, and time. No distress.   HENT:   Head: Normocephalic and atraumatic.   Eyes: Conjunctivae are normal. Pupils are equal, round, and reactive to light. Right eye exhibits no discharge. Left eye exhibits no discharge.   Neck: Normal range of motion. Neck supple.   Cardiovascular: Normal rate and intact distal pulses.    Pulmonary/Chest: Effort normal. No respiratory distress.   Abdominal: Soft. There is no tenderness. There is no rebound and no guarding.   Musculoskeletal: Normal range of motion. He exhibits no edema or tenderness.   Neurological: He is alert and oriented to person, place, and time.   Skin: Skin is warm and dry. He is not diaphoretic.   4 cm irregular laceration involving the medial canthus.    Nursing note and vitals reviewed.      ED Course     ED Course     Procedures             Critical Care time:  none               Labs Ordered and Resulted from Time of ED Arrival Up to the Time of Departure from the ED - No data to display         Assessments & Plan (with Medical Decision Making)   1. Eyelid laceration    67 yo M with an eyelid laceration involving the medial canthus.  Ophthalmology consulted and it was recommended that she be seen in the oculoplastics clinic this am.  No evidence of globe injury.  Patient discharged with ophthalmology follow up.         I have reviewed the nursing notes.    I have reviewed the findings, diagnosis, plan and need for follow up with the patient.    New Prescriptions    No medications on file       Final diagnoses:   Eyelid laceration, right, initial encounter       6/5/2017   [facility], [place], EMERGENCY DEPARTMENT     Jeb Freeman MD  06/05/17 0724    "

## 2017-06-05 NOTE — OP NOTE
PREOPERATIVE DIAGNOSIS:   1.Right lower eyelid laceration involving the margin  2. Canalicular laceration right lower eyelid    POSTOPERATIVE DIAGNOSIS:   1.Right lower eyelid laceration involving the margin  2. Canalicular laceration right lower eyelid    PROCEDURE:  1. Full thickness right lower eyelid laceration repair  2. Right canalicular repair  3. SIlicone intubation of right lacrimal system    SURGEON: Al Ac M.D.    ASSISTANTS: Talmage Broadbent MD, PhD     ANESTHESIA: Monitored anesthesia care with local infiltration of a 50/50 mixture of 2% lidocaine with epinephrine and 0.5% Marcaine.     COMPLICATIONS: None.     ESTIMATED BLOOD LOSS: Less than 5 mL.     HISTORY: Drake Santiago  presented after a fall.  He tripped over his dog and hit his eye on a cabinet.  He sustained a full thickness laceration of the right lower eyelid involving the lacrimal system.    DESCRIPTION OF PROCEDURE: Drake Santiago  was brought to the operating room and placed supine on the operating table. IV sedation was given.  The wound was inspected.  The right lower eyelid sustained a laceration full thickness through the eyelid.  The lower medial canthal tendon was cut as well as the canaliculus.  There was some orbital fat exposed medially.  The upper limb of the medial canthal tendon was intact.  The lower puncta was intact. The wound was explored and the cut end of the lower canaliculus was not plainly visible.  A pigtail probe was placed through the upper puncta and was seen coming through the cut end of the canaliculus.  A vicryl suture was threaded through the pigtail stent and pulled through the upper system.  We were unable to thread a stent over the suture.  At this point a vargas tube was placed through the lower puncta and placed into the cut end of the canaliculus.  This was difficult to do in that the laceration was so medial that it was just lateral to the insertion into the sac.  The vargas was  retrieved through the nose.  It was then passed through the upper puncta and retrieved through the nose.  The lower eyelid margin was then re approximated with interrupted 5-0 vicryl sutures.  The lid was in excellent position.  The skin was then closed with deep 5-0 vicryl sutures and 6-0 plain gut sutures.  The stent was then tied and cut and released into the nose.    The patient tolerated the procedure well and  left the operating room in stable condition.     KIRT GIRALDO MD

## 2017-06-05 NOTE — MR AVS SNAPSHOT
After Visit Summary   6/5/2017    Drake Santiago    MRN: [id number]           Patient Information     Date Of Birth          1950        Visit Information        Provider Department      6/5/2017 7:30 AM Broadbent, Talmage Jay, MD [facility] Ophthalmology        Today's Diagnoses     Laceration of skin of eyelid and periocular area    -  1    Canalicular laceration, right, initial encounter           Follow-ups after your visit        Your next 10 appointments already scheduled     Jun 05, 2017   Procedure with Al Ac MD   [facility] ([facility])    [address] [place] [address]  5th Floor  [facility] [address] [phone]           Located in the Clinics and Surgery Center at [address] [place] [address].   parking is very convenient and highly recommended.  is a $6 flat rate fee.  Both  and self parkers should enter the main arrival plaza from [facility]; parking attendants will direct you based on your parking preference.              Who to contact     Please call your clinic at [phone] to:    Ask questions about your health    Make or cancel appointments    Discuss your medicines    Learn about your test results    Speak to your doctor   If you have compliments or concerns about an experience at your clinic, or if you wish to file a complaint, please contact [facility] Patient Relations at [phone] or email us at [email or web address]         Additional Information About Your Visit        MyChart Information     MyChart gives you secure access to your electronic health record. If you see a primary care provider, you can also send messages to your care team and make appointments. If you have questions, please call your primary care clinic.  If you do not have a primary care provider, please call [phone] and they will assist you.      [employer] is an  "electronic gateway that provides easy, online access to your medical records. With [employer], you can request a clinic appointment, read your test results, renew a prescription or communicate with your care team.     To access your existing account, please contact your [facility] or call [phone] for assistance.        Care EveryWhere ID     This is your Care EveryWhere ID. This could be used by other organizations to access your [facility] medical records  [id number]        Your Vitals Were     Height BMI (Body Mass Index)                1.778 m (5' 10\") 27.98 kg/m2           Blood Pressure from Last 3 Encounters:   06/05/17 149/90   06/05/17 (!) 138/94   04/21/17 124/70    Weight from Last 3 Encounters:   06/05/17 88.5 kg (195 lb)   06/05/17 88.5 kg (195 lb)   04/21/17 89.4 kg (197 lb)              We Performed the Following     External Photos OU (both eyes)     Madelin-Operative Worksheet (Plastics)        Primary Care Provider Office Phone # Fax #    Christiano Braun [phone]       [facility] [address] [place] [address]        Thank you!     Thank you for choosing [facility] OPHTHALMOLOGY  for your care. Our goal is always to provide you with excellent care. Hearing back from our patients is one way we can continue to improve our services. Please take a few minutes to complete the written survey that you may receive in the mail after your visit with us. Thank you!             Your Updated Medication List - Protect others around you: Learn how to safely use, store and throw away your medicines at www.disposemymeds.org.          This list is accurate as of: 6/5/17  9:06 AM.  Always use your most recent med list.                   Brand Name Dispense Instructions for use    atorvastatin 20 MG tablet    LIPITOR    90 tablet    Take 1 tablet (20 mg) by mouth daily       DIPHENHYDRAMINE HCL PO      Take 50 mg by mouth nightly as needed    "    IBUPROFEN PO          tamsulosin 0.4 MG capsule    FLOMAX    90 capsule    Take 1 capsule (0.4 mg) by mouth daily

## 2017-06-05 NOTE — CONSULTS
OPHTHALMOLOGY CONSULT NOTE  06/05/17, 6:30 AM    Patient: Drake Santiago  Consulted by: ED  Reason for consult: eyelid lac    HISTORY OF PRESENTING ILLNESS:     Patient is a 66 year old year old male who was transferred from OSH for right eyelid laceration. He tripped over his dog in the middle of the night and landed on a corner of a dresser. He denies any vision changes.     Review of systems were otherwise negative except for that which has been stated above.      OCULAR/MEDICAL/SURGICAL HISTORIES:     Past Ocular History:  None     Past Medical History:   Diagnosis Date     BPH (benign prostatic hyperplasia)      Hyperlipidemia        Past Surgical History:   Procedure Laterality Date     HERNIORRHAPHY INGUINAL           CURRENT MEDICATIONS:     Current Facility-Administered Medications   Medication     proparacaine (ALCAINE) 0.5 % ophthalmic solution     erythromycin (ROMYCIN) ophthalmic ointment     Current Outpatient Prescriptions   Medication     IBUPROFEN PO     DIPHENHYDRAMINE HCL PO     tamsulosin (FLOMAX) 0.4 MG capsule     atorvastatin (LIPITOR) 20 MG tablet         EXAMINATION:     VAcc : RE 20/20. (Near)  Motility: Full  Confrontational Visual Field: Full   Pupils: Equal and reactive to light and accomodation with no afferent pupillary defect.  IOP normal by palpation     External/Slit Lamp exam   RIGHT   Lids/lashes: medial lower eyelid laceration (medial to punctum, involving lid margin). Triangular piece of skin missing medially. Superficial forehead abrasion.    Conj/Sclera: White and quiet   Cornea:  Clear   Ant Chamber:  Deep and quiet   Iris: round and reactive   Lens: Clear  LEFT   Lids/lashes: No abn.    Conj/Sclera: White and quiet   Cornea:  Clear   Ant Chamber:  Deep and quiet   Iris: round and reactive   Lens:Clear    Dilated Fundus Exam  Eyes Dilated? Yes  Time:  With Phenylephrine 2.5% and Mydriacyl 1%   (Normally, dilation with the above lasts about 4-6 hours)  RIGHT:   Anterior  vitreous: clear   Media: clear   Optic nerve: normal contours and size   Cup to Disc Ratio: 0.3   Macula: flat and no pigment abnormality   Vessels: normal caliber and distribution   Retinal Periphery: no holes or tears identified  ASSESSMENT/PLAN:     Ophthalmology was consulted to evaluate Drake Santiago, who is a 66 year old male presenting with concerns for right lower eyelid laceration.     1. Right medial lower eyelid laceration:   - involving eyelid margin and cannalicular system  - will have him see Dr. Broadbent (Oculoplastics) today at 7:15AM in the [facility] for evaluation and possible repair (procedure room vs. OR).   - remain NPO for now  - s/p tetanus shot  - erythromycin ointment over lid laceration in the interim.    Please contact us with any further questions or concerns.      Jeffery Cerda MD  Ophthalmology, PGY-3   Pager [phone]

## 2017-06-05 NOTE — DISCHARGE INSTRUCTIONS
Please go to the [facility] for an ophthalmology/oculopastics appointment at 730 am.

## 2017-06-05 NOTE — IP AVS SNAPSHOT
[facility]    [address] [place] [address]    Phone:  [phone]    Fax:  [phone]                                       After Visit Summary   6/5/2017    Drake Santiago    MRN: [id number]           After Visit Summary Signature Page     I have received my discharge instructions, and my questions have been answered. I have discussed any challenges I see with this plan with the nurse or doctor.    ..........................................................................................................................................  Patient/Patient Representative Signature      ..........................................................................................................................................  Patient Representative Print Name and Relationship to Patient    ..................................................               ................................................  Date                                            Time    ..........................................................................................................................................  Reviewed by Signature/Title    ...................................................              ..............................................  Date                                                            Time

## 2017-06-05 NOTE — ED NOTES
Pt tripped on there dog and fell head first into a corner of a dresser, rt eye swelling and laceration. No LOC

## 2017-06-05 NOTE — DISCHARGE INSTRUCTIONS
[place] [facility] [place]  I spoke with Dr. Mas and Dr. Werner.  Plastic surgery will evaluate you and make plans to repair your laceration.  Do not eat or drink anything in case they end up having to sedate you or take you to the OR.  It was nice visiting with both you tonight.   I hope this heals up quickly for you.    Thank you for choosing [facility]. We appreciate the opportunity to meet your urgent medical needs. Please let us know if we could have done anything to make your stay more satisfying.    After discharge, please closely monitor for any new or worsening symptoms. Return to the Emergency Department if you develop any acute worsening signs or symptoms.    If you had lab work, cultures or imaging studies done during your stay, the final results may still be pending. We will call you if your plan of care needs to change. However, if you are not improving as expected, please follow up with your primary care provider or clinic.     Start any prescription medications that were prescribed to you and take them as directed.     Please see additional handouts that may be pertinent to your condition.

## 2017-06-05 NOTE — IP AVS SNAPSHOT
MRN:[id number]                      After Visit Summary   6/5/2017    Drake Santiago    MRN: [id number]           Thank you!     Thank you for choosing [facility] for your care. Our goal is always to provide you with excellent care. Hearing back from our patients is one way we can continue to improve our services. Please take a few minutes to complete the written survey that you may receive in the mail after you visit with us. Thank you!        Patient Information     Date Of Birth          1950        About your hospital stay     You were admitted on:  June 5, 2017 You last received care in the:  [facility]    You were discharged on:  June 5, 2017       Who to Call     For medical emergencies, please call 911.  For non-urgent questions about your medical care, please call your primary care provider or clinic, [phone]  For questions related to your surgery, please call your surgery clinic        Attending Provider     Provider Specialty    Al Ac MD Ophthalmology       Primary Care Provider Office Phone # Fax #    Christiano Braun [phone]      After Care Instructions     Discharge Medication Instructions       Do NOT take aspirin or medications containing NSAIDS for 72 hours after procedure.            Ice to affected area       Apply cold pack for 15 minutes on, 15 minutes off, for 48 hours while awake.                  Further instructions from your care team       [facility]  Home Care Following Anesthesia  For 24 hours after surgery:  1. Get plenty of rest.  A responsible adult must stay with you for at least 24 hours after you leave the surgery center.  2. Do not drive or use heavy equipment.  If you have weakness or tingling, don't drive or use heavy equipment until this feeling goes away.   3. Do not drink alcohol.   4. Avoid strenuous or risky activities.  Ask for help when climbing  stairs.  5. You may feel lightheaded.  IF so, sit for a few minutes before standing.  Have someone help you get up.   6. If you have nausea (feel sick to your stomach): Drink only clear liquids such as apple juice, ginger ale, broth or 7-Up.  Rest may also help.  Be sure to drink enough fluids.  Move to a regular diet as you feel able.   7. You may have a slight fever.  Call the doctor if your fever is over 100 F (37.7 C) (taken under the tongue) or lasts longer than 24 hours.  8. You may have a dry mouth, a sore throat, muscle aches or trouble sleeping. These should go away after 24 hours.  9. Do not make important or legal decisions.               Tips for taking pain medications  To get the best pain relief possible, remember these points:    Take pain medications as directed, before pain becomes severe.    Pain medication can upset your stomach: taking it with food may help.    Constipation is a common side effect of pain medication. Drink plenty of  fluids.    Eat foods high in fiber. Take a stool softener if recommended by your doctor or pharmacist.    Do not drink alcohol, drive or operate machinery while taking pain medications.    Ask about other ways to control pain, such as with heat, ice or relaxation.    Call a doctor for any of the followin. Signs of infection (fever, growing tenderness at the surgery site, a large amount of drainage or bleeding, severe pain, foul-smelling drainage, redness, swelling).  2. It has been over 8 to 10 hours since surgery and you are still not able to urinate (pass water).  3. Headache for over 24 hours.  4. Numbness, tingling or weakness the day after surgery (if you had spinal anesthesia).  Your doctor is:  Dr. Al Ac, Ophthalmology: [phone]                    Or dial [phone] and ask for the resident on call for:  Ophthalmology  For emergency care, call the:  [facility]:  [phone] (TTY for hearing impaired:  [phone])                  Post-operative Instructions    Ophthalmic Plastic and Reconstructive Surgery  Al Ac M.D.  Talmage Broadbent, M.D. PhD.    All instructions apply to the operated eye(s) or eyelid(s).  Wound care and personal care  ? If a patch or bandage has been placed, please leave this in place until seen by your physician. Ensure that the bandage does not get wet when you take a shower.  ? Apply ice compresses 15 minutes on 15 minutes off while awake for 2 days, then switch to warm water compresses 4 times a day until seen by your physician. For warm packs you can place a cup of dry uncooked rice in a clean cotton sock. Then place sock in microwave 30 seconds to one minute. Next place the warm sock into a plastic bag and wrap the bag with clean warm wet washcloth and place over operated eye.    ? You may shower or wash your hair the day after surgery. Do not bathe or go swimming for 1 week to prevent contamination of your wounds.  ? Do not apply make-up to the eyes or eyelids for 2 weeks after surgery.  ? Expect some swelling, bruising, black eye (even into the lower eyelids and cheeks). Also expect serum caking, crusting and discharge from the eye and/or incisions. A small amount of surface bleeding is normal for the first 48 hours.  ? Your eye(s) and eyelid(s) may be painful and tender. This is normal after surgery.  Use the pain medication as prescribed. If your pain does not improve despite the  medication, contact the office.    Contact information and follow-up  ? Return to the Eye Clinic for a follow-up appointment with your physician as  scheduled. If no appointment has been scheduled, call [phone] for an  appointment with Dr. Ac within 1 to 2 weeks from your date of surgery.  ? For severe pain, bleeding, or loss of vision, call the Eye Clinic at [phone].  After hours or on weekends and holidays, call [phone] and ask to speak with  the ophthalmologist on  call.    Activity restrictions and driving  ? Avoid heavy lifting, bending, exercise or strenuous activity for 1 week after surgery.  You may resume other activities and return to work as tolerated.  ? You may not resume driving until have you stopped using narcotic pain medications(such as Norco, Percocet, Tylenol #3).    Medications  ? Restart all your regular home medications and eye drops. If you take Plavix or  Aspirin on a regular basis, wait for 3 days after your surgery before restarting these  in order to decrease the risk of bleeding complications.  ? Avoid aspirin and aspirin-like medications (Motrin, Aleve, Ibuprofen, Reyna-  [place] etc) for 5 days to reduce the risk of bleeding. You may take Tylenol  (acetaminophen) for pain.  ? In addition to your home medications, take the following post-operative medications as prescribed by your physician.    ? Apply antibiotic ointment to all sutures three times a day, and into the operated eye(s) at night.  ? Instill eye drops 3 times a day until finished.  ? Take antibiotic capsules or tablets as directed.  ? Take 1 to 2 pain pills as needed for pain up to every 4 hours.    ? WARNING: All the prescription pain medications listed above contain Tylenol  (acetaminophen). You must not take more than 4,000 mg of acetaminophen per  24-hour period. This is equivalent to 6 tablets of Darvocet, 8 tablets of Vicodin, or  12 tablets of Norco, Percocet or Tylenol #3. If you take other over-the-counter medications  containing acetaminophen, you must take the amount of acetaminophen into  account and reduce the number of prescribed pain pills accordingly.  ? The prescribed medications may make you drowsy. You must not drive a car,  operate heavy machinery or drink alcohol while taking them.  ? The prescribed pain medications may cause constipation and nausea. Take them  with some food to prevent a stomach upset. If you continue to experience nausea,  call your physician.  "        Pending Results     No orders found from 6/3/2017 to 6/6/2017.            Admission Information     Date & Time Provider Department Dept. Phone    6/5/2017 Al Ac MD [facility] [phone]      Your Vitals Were     Blood Pressure Pulse Temperature Respirations Height Weight    131/92 53 97.7  F (36.5  C) (Oral) 16 1.778 m (5' 10\") 88.5 kg (195 lb)    Pulse Oximetry BMI (Body Mass Index)                98% 27.98 kg/m2          MyChart Information     [facility] gives you secure access to your electronic health record. If you see a primary care provider, you can also send messages to your care team and make appointments. If you have questions, please call your primary care clinic.  If you do not have a primary care provider, please call [phone] and they will assist you.      [facility] is an electronic gateway that provides easy, online access to your medical records. With [facility], you can request a clinic appointment, read your test results, renew a prescription or communicate with your care team.     To access your existing account, please contact your [facility] or call [phone] for assistance.        Care EveryWhere ID     This is your Care EveryWhere ID. This could be used by other organizations to access your [facility] medical records  [id number]           Review of your medicines      START taking        Dose / Directions    clindamycin 150 MG capsule   Commonly known as:  CLEOCIN   Used for:  Postoperative state        Dose:  150 mg   Take 1 capsule (150 mg) by mouth 3 times daily   Quantity:  30 capsule   Refills:  0       erythromycin ophthalmic ointment   Commonly known as:  ROMYCIN   Used for:  Postoperative state        Dose:  1 Application   Apply 1 Application to eye 3 times daily for 7 days Apply small amount to incision sites, as directed per physician instructions.   Quantity:  3.5 g   Refills:  0       " HYDROcodone-acetaminophen 5-325 MG per tablet   Commonly known as:  NORCO   Used for:  Postoperative state        Dose:  1 tablet   Take 1 tablet by mouth every 6 hours as needed for pain Maximum of 4000 mg of acetaminophen in 24 hours.   Quantity:  10 tablet   Refills:  0       neomycin-polymyxin-dexamethasone 3.5-24142-9.1 Susp ophthalmic susp   Commonly known as:  MAXITROL   Used for:  Postoperative state        Dose:  1 drop   Place 1 drop into the right eye 4 times daily   Quantity:  1 Bottle   Refills:  0         CONTINUE these medicines which have NOT CHANGED        Dose / Directions    atorvastatin 20 MG tablet   Commonly known as:  LIPITOR   Used for:  Hyperlipidemia LDL goal <130        Dose:  20 mg   Take 1 tablet (20 mg) by mouth daily   Quantity:  90 tablet   Refills:  3       DIPHENHYDRAMINE HCL PO        Dose:  50 mg   Take 50 mg by mouth nightly as needed   Refills:  0       IBUPROFEN PO        Refills:  0       tamsulosin 0.4 MG capsule   Commonly known as:  FLOMAX   Used for:  Benign prostatic hyperplasia with lower urinary tract symptoms, unspecified morphology        Dose:  0.4 mg   Take 1 capsule (0.4 mg) by mouth daily   Quantity:  90 capsule   Refills:  3            Where to get your medicines      These medications were sent to [facility] [address] [address]    Hours:  TRANSPLANT PHONE NUMBER [phone] Phone:  [phone]     clindamycin 150 MG capsule    erythromycin ophthalmic ointment    neomycin-polymyxin-dexamethasone 3.5-96258-4.1 Susp ophthalmic susp         Some of these will need a paper prescription and others can be bought over the counter. Ask your nurse if you have questions.     Bring a paper prescription for each of these medications     HYDROcodone-acetaminophen 5-325 MG per tablet                Protect others around you: Learn how to safely use, store and throw away your  medicines at www.disposemymeds.org.             Medication List: This is a list of all your medications and when to take them. Check marks below indicate your daily home schedule. Keep this list as a reference.      Medications           Morning Afternoon Evening Bedtime As Needed    atorvastatin 20 MG tablet   Commonly known as:  LIPITOR   Take 1 tablet (20 mg) by mouth daily                                clindamycin 150 MG capsule   Commonly known as:  CLEOCIN   Take 1 capsule (150 mg) by mouth 3 times daily                                DIPHENHYDRAMINE HCL PO   Take 50 mg by mouth nightly as needed                                erythromycin ophthalmic ointment   Commonly known as:  ROMYCIN   Apply 1 Application to eye 3 times daily for 7 days Apply small amount to incision sites, as directed per physician instructions.                                HYDROcodone-acetaminophen 5-325 MG per tablet   Commonly known as:  NORCO   Take 1 tablet by mouth every 6 hours as needed for pain Maximum of 4000 mg of acetaminophen in 24 hours.                                IBUPROFEN PO                                neomycin-polymyxin-dexamethasone 3.5-74761-0.1 Susp ophthalmic susp   Commonly known as:  MAXITROL   Place 1 drop into the right eye 4 times daily                                tamsulosin 0.4 MG capsule   Commonly known as:  FLOMAX   Take 1 capsule (0.4 mg) by mouth daily

## 2017-06-05 NOTE — BRIEF OP NOTE
[facility] Brief Operative Note    Pre-operative diagnosis: Lid Laceration, right lower   Post-operative diagnosis Same   Procedure: Procedure(s):  right lower lid laceration repair, right canalicular laceration repair possible forehead laceartion repair       - Wound Class: I-Clean   - Wound Class: I-Clean   Surgeon: Al Ac M.D.    Assistants(s): Talmage Broadbent, M.D. PhD,    Estimated blood loss: Less than 10 mL   Specimens: None   Findings: As expected

## 2017-06-05 NOTE — ED AVS SNAPSHOT
[facility], [place], Emergency Department    [address] [address]    Phone:  [phone]                                       Drake Santiago   MRN: [id number]    Department:  [facility], Emergency Department   Date of Visit:  6/5/2017           After Visit Summary Signature Page     I have received my discharge instructions, and my questions have been answered. I have discussed any challenges I see with this plan with the nurse or doctor.    ..........................................................................................................................................  Patient/Patient Representative Signature      ..........................................................................................................................................  Patient Representative Print Name and Relationship to Patient    ..................................................               ................................................  Date                                            Time    ..........................................................................................................................................  Reviewed by Signature/Title    ...................................................              ..............................................  Date                                                            Time

## 2017-06-05 NOTE — ANESTHESIA POSTPROCEDURE EVALUATION
Patient: Drake Santiago    Procedure(s):  right lower lid laceration repair, right canalicular laceration repair       - Wound Class: I-Clean    Diagnosis:Lid Laceration, right lower  Diagnosis Additional Information: No value filed.    Anesthesia Type:  MAC    Note:  Anesthesia Post Evaluation    Patient location during evaluation: PACU  Level of consciousness: awake and alert  Pain management: adequate  Airway patency: patent  Cardiovascular status: blood pressure returned to baseline and hemodynamically unstable  Respiratory status: acceptable  Hydration status: euvolemic  PONV: none             Last vitals:  Vitals:    06/05/17 0958 06/05/17 1325   BP: (!) 131/92 119/77   Pulse: 53    Resp: 16 16   Temp: 36.5  C (97.7  F) 36.4  C (97.6  F)   SpO2: 98% 94%         Electronically Signed By: David Fisher MD  June 5, 2017  1:48 PM

## 2017-06-05 NOTE — DISCHARGE INSTRUCTIONS
[facility]  Home Care Following Anesthesia  For 24 hours after surgery:  1. Get plenty of rest.  A responsible adult must stay with you for at least 24 hours after you leave the surgery center.  2. Do not drive or use heavy equipment.  If you have weakness or tingling, don't drive or use heavy equipment until this feeling goes away.   3. Do not drink alcohol.   4. Avoid strenuous or risky activities.  Ask for help when climbing stairs.  5. You may feel lightheaded.  IF so, sit for a few minutes before standing.  Have someone help you get up.   6. If you have nausea (feel sick to your stomach): Drink only clear liquids such as apple juice, ginger ale, broth or 7-Up.  Rest may also help.  Be sure to drink enough fluids.  Move to a regular diet as you feel able.   7. You may have a slight fever.  Call the doctor if your fever is over 100 F (37.7 C) (taken under the tongue) or lasts longer than 24 hours.  8. You may have a dry mouth, a sore throat, muscle aches or trouble sleeping. These should go away after 24 hours.  9. Do not make important or legal decisions.               Tips for taking pain medications  To get the best pain relief possible, remember these points:    Take pain medications as directed, before pain becomes severe.    Pain medication can upset your stomach: taking it with food may help.    Constipation is a common side effect of pain medication. Drink plenty of  fluids.    Eat foods high in fiber. Take a stool softener if recommended by your doctor or pharmacist.    Do not drink alcohol, drive or operate machinery while taking pain medications.    Ask about other ways to control pain, such as with heat, ice or relaxation.    Call a doctor for any of the followin. Signs of infection (fever, growing tenderness at the surgery site, a large amount of drainage or bleeding, severe pain, foul-smelling drainage, redness, swelling).  2. It has been over 8 to 10 hours  since surgery and you are still not able to urinate (pass water).  3. Headache for over 24 hours.  4. Numbness, tingling or weakness the day after surgery (if you had spinal anesthesia).  Your doctor is:  Dr. Al Ac, Ophthalmology: [phone]                    Or dial [phone] and ask for the resident on call for:  Ophthalmology  For emergency care, call the:  [facility]:  [phone] (TTY for hearing impaired: [phone])                  Post-operative Instructions    Ophthalmic Plastic and Reconstructive Surgery  Al Ac M.D.  Talmage Broadbent, M.D. PhD.    All instructions apply to the operated eye(s) or eyelid(s).  Wound care and personal care  ? If a patch or bandage has been placed, please leave this in place until seen by your physician. Ensure that the bandage does not get wet when you take a shower.  ? Apply ice compresses 15 minutes on 15 minutes off while awake for 2 days, then switch to warm water compresses 4 times a day until seen by your physician. For warm packs you can place a cup of dry uncooked rice in a clean cotton sock. Then place sock in microwave 30 seconds to one minute. Next place the warm sock into a plastic bag and wrap the bag with clean warm wet washcloth and place over operated eye.    ? You may shower or wash your hair the day after surgery. Do not bathe or go swimming for 1 week to prevent contamination of your wounds.  ? Do not apply make-up to the eyes or eyelids for 2 weeks after surgery.  ? Expect some swelling, bruising, black eye (even into the lower eyelids and cheeks). Also expect serum caking, crusting and discharge from the eye and/or incisions. A small amount of surface bleeding is normal for the first 48 hours.  ? Your eye(s) and eyelid(s) may be painful and tender. This is normal after surgery.  Use the pain medication as prescribed. If your pain does not improve despite the  medication, contact the office.    Contact  information and follow-up  ? Return to the Eye Clinic for a follow-up appointment with your physician as  scheduled. If no appointment has been scheduled, call [phone] for an  appointment with Dr. Ac within 1 to 2 weeks from your date of surgery.  ? For severe pain, bleeding, or loss of vision, call the Eye Clinic at [phone].  After hours or on weekends and holidays, call [phone] and ask to speak with  the ophthalmologist on call.    Activity restrictions and driving  ? Avoid heavy lifting, bending, exercise or strenuous activity for 1 week after surgery.  You may resume other activities and return to work as tolerated.  ? You may not resume driving until have you stopped using narcotic pain medications(such as Norco, Percocet, Tylenol #3).    Medications  ? Restart all your regular home medications and eye drops. If you take Plavix or  Aspirin on a regular basis, wait for 3 days after your surgery before restarting these  in order to decrease the risk of bleeding complications.  ? Avoid aspirin and aspirin-like medications (Motrin, Aleve, Ibuprofen, Reyna-  [place] etc) for 5 days to reduce the risk of bleeding. You may take Tylenol  (acetaminophen) for pain.  ? In addition to your home medications, take the following post-operative medications as prescribed by your physician.    ? Apply antibiotic ointment to all sutures three times a day, and into the operated eye(s) at night.  ? Instill eye drops 3 times a day until finished.  ? Take antibiotic capsules or tablets as directed.  ? Take 1 to 2 pain pills as needed for pain up to every 4 hours.    ? WARNING: All the prescription pain medications listed above contain Tylenol  (acetaminophen). You must not take more than 4,000 mg of acetaminophen per  24-hour period. This is equivalent to 6 tablets of Darvocet, 8 tablets of Vicodin, or  12 tablets of Norco, Percocet or Tylenol #3. If you take other over-the-counter medications  containing  acetaminophen, you must take the amount of acetaminophen into  account and reduce the number of prescribed pain pills accordingly.  ? The prescribed medications may make you drowsy. You must not drive a car,  operate heavy machinery or drink alcohol while taking them.  ? The prescribed pain medications may cause constipation and nausea. Take them  with some food to prevent a stomach upset. If you continue to experience nausea,  call your physician.

## 2017-06-05 NOTE — ED PROVIDER NOTES
History     Chief Complaint   Patient presents with     Facial Laceration     HPI  Drake Santiago is a 66 year old male who presents to the ED tonight with a laceration under the right eye.  He got up to go to the bathroom and tripped over the dog usually doesn't sleep in his bedroom.  He fell forward and his face on the corner of a dresser sustaining a laceration to the right lower eyelid.  He denies any loss of consciousness.  He can see with his glasses.  No neck pain.  No nausea or vomiting.  Has a couple of superficial abrasions on the right forehead that do not require repair.  He is uncertain of his last tetanus.    I have reviewed the Medications, Allergies, Past Medical and Surgical History, and Social History in the Epic system.    Review of Systems    Physical Exam   BP: (!) 138/94  Heart Rate: 78  Resp: 18  SpO2: 100 %  Physical Exam   Constitutional: He is oriented to person, place, and time. He appears well-developed and well-nourished. He appears distressed (mild).   HENT:   2 abrasions on the right forehead that do not require repair.   Eyes: EOM are normal. Pupils are equal, round, and reactive to light.   Slit lamp exam:       The right eye shows no hyphema.   Neck: Normal range of motion. Neck supple.   Pulmonary/Chest: Effort normal. No respiratory distress.   Neurological: He is alert and oriented to person, place, and time. No cranial nerve deficit.   Skin: Skin is warm and dry.   Psychiatric: He has a normal mood and affect.               ED Course    The laceration involves the tear duct and will require closure by plastic surgery.      0131: Call placed to the [facility] .  Dr. Werner from plastic surgery will be calling me back.      1:36 AM:  Dr. Werner called back quickly and I discussed the case with her.  We will send him down to the [facility] emergency department ([place]) and the plastics fellow will evaluate him.  Decision to repair in the ED vs the  OR vs having oculoplastics involved can be made then.  He can go by private car.      He declined any pain medication in the ED.      His tetanus was updated tonight.         ED Course     Procedures              Assessments & Plan (with Medical Decision Making)    (S01.111A) Eyelid laceration, right, initial encounter  Comment: lower lid, involving the medial canthus/tear duct  Plan: See discussion above.    Transfer by private car to the [facility] emergency Department were plastic surgery will meet him and make plans for repair.            I have reviewed the nursing notes.    I have reviewed the findings, diagnosis, plan and need for follow up with the patient.    New Prescriptions    No medications on file       Final diagnoses:   Eyelid laceration, right, initial encounter - lower lid, involving the medial canthus/tear duct       6/5/2017   [facility] EMERGENCY DEPARTMENT     Tyrell Chambers MD  06/05/17 0154

## 2017-06-05 NOTE — NURSING NOTE
Chief Complaints and History of Present Illnesses   Patient presents with     Consult For     LLL lacereration     Follow Up For     ED     HPI    Affected eye(s):  Left   Symptoms:     Blurred vision (Comment: due to dilation drops per pt)   No floaters   No flashes   No redness   No foreign body sensation   No tearing   No itching   No burning   No eye discharge         Do you have eye pain now?:  No      Comments:  Patient notes he woke up in the middle of the night, tripped over the dog, tripped and hit a dresser    Faiza [facility] June 5, 2017 7:52 AM

## 2017-09-27 DIAGNOSIS — Z53.9 ERRONEOUS ENCOUNTER--DISREGARD: Primary | ICD-10-CM

## 2017-12-15 ENCOUNTER — OFFICE VISIT (OUTPATIENT)
Dept: FAMILY MEDICINE | Facility: CLINIC | Age: 67
End: 2017-12-15
Payer: MEDICARE

## 2017-12-15 VITALS
TEMPERATURE: 97.4 F | HEIGHT: 70 IN | OXYGEN SATURATION: 98 % | HEART RATE: 82 BPM | BODY MASS INDEX: 28.33 KG/M2 | WEIGHT: 197.9 LBS | DIASTOLIC BLOOD PRESSURE: 70 MMHG | SYSTOLIC BLOOD PRESSURE: 120 MMHG

## 2017-12-15 DIAGNOSIS — Z23 NEED FOR PROPHYLACTIC VACCINATION AND INOCULATION AGAINST INFLUENZA: ICD-10-CM

## 2017-12-15 DIAGNOSIS — N40.1 BENIGN PROSTATIC HYPERPLASIA WITH LOWER URINARY TRACT SYMPTOMS, SYMPTOM DETAILS UNSPECIFIED: ICD-10-CM

## 2017-12-15 DIAGNOSIS — E55.9 VITAMIN D DEFICIENCY: ICD-10-CM

## 2017-12-15 DIAGNOSIS — E78.5 HYPERLIPIDEMIA LDL GOAL <130: ICD-10-CM

## 2017-12-15 DIAGNOSIS — R42 DIZZINESS: Primary | ICD-10-CM

## 2017-12-15 DIAGNOSIS — H90.6 MIXED CONDUCTIVE AND SENSORINEURAL HEARING LOSS OF BOTH EARS: ICD-10-CM

## 2017-12-15 DIAGNOSIS — R53.83 FATIGUE, UNSPECIFIED TYPE: ICD-10-CM

## 2017-12-15 DIAGNOSIS — G47.00 INSOMNIA, UNSPECIFIED TYPE: ICD-10-CM

## 2017-12-15 LAB
ALBUMIN SERPL-MCNC: 3.8 G/DL (ref 3.4–5)
ALP SERPL-CCNC: 73 U/L (ref 40–150)
ALT SERPL W P-5'-P-CCNC: 35 U/L (ref 0–70)
ANION GAP SERPL CALCULATED.3IONS-SCNC: 6 MMOL/L (ref 3–14)
AST SERPL W P-5'-P-CCNC: 22 U/L (ref 0–45)
BASOPHILS # BLD AUTO: 0 10E9/L (ref 0–0.2)
BASOPHILS NFR BLD AUTO: 0.6 %
BILIRUB SERPL-MCNC: 0.5 MG/DL (ref 0.2–1.3)
BUN SERPL-MCNC: 20 MG/DL (ref 7–30)
CALCIUM SERPL-MCNC: 8.7 MG/DL (ref 8.5–10.1)
CHLORIDE SERPL-SCNC: 105 MMOL/L (ref 94–109)
CO2 SERPL-SCNC: 30 MMOL/L (ref 20–32)
CREAT SERPL-MCNC: 1.02 MG/DL (ref 0.66–1.25)
CRP SERPL-MCNC: <2.9 MG/L (ref 0–8)
DEPRECATED CALCIDIOL+CALCIFEROL SERPL-MC: 33 UG/L (ref 20–75)
DIFFERENTIAL METHOD BLD: NORMAL
EOSINOPHIL # BLD AUTO: 0.2 10E9/L (ref 0–0.7)
EOSINOPHIL NFR BLD AUTO: 3.4 %
ERYTHROCYTE [DISTWIDTH] IN BLOOD BY AUTOMATED COUNT: 14 % (ref 10–15)
ERYTHROCYTE [SEDIMENTATION RATE] IN BLOOD BY WESTERGREN METHOD: 7 MM/H (ref 0–20)
GFR SERPL CREATININE-BSD FRML MDRD: 73 ML/MIN/1.7M2
GLUCOSE SERPL-MCNC: 100 MG/DL (ref 70–99)
HCT VFR BLD AUTO: 45.7 % (ref 40–53)
HGB BLD-MCNC: 14.6 G/DL (ref 13.3–17.7)
IMM GRANULOCYTES # BLD: 0 10E9/L (ref 0–0.4)
IMM GRANULOCYTES NFR BLD: 0.2 %
LYMPHOCYTES # BLD AUTO: 1.4 10E9/L (ref 0.8–5.3)
LYMPHOCYTES NFR BLD AUTO: 28.7 %
MCH RBC QN AUTO: 29.6 PG (ref 26.5–33)
MCHC RBC AUTO-ENTMCNC: 31.9 G/DL (ref 31.5–36.5)
MCV RBC AUTO: 93 FL (ref 78–100)
MONOCYTES # BLD AUTO: 0.5 10E9/L (ref 0–1.3)
MONOCYTES NFR BLD AUTO: 9.4 %
NEUTROPHILS # BLD AUTO: 2.9 10E9/L (ref 1.6–8.3)
NEUTROPHILS NFR BLD AUTO: 57.7 %
PLATELET # BLD AUTO: 219 10E9/L (ref 150–450)
POTASSIUM SERPL-SCNC: 3.9 MMOL/L (ref 3.4–5.3)
PROT SERPL-MCNC: 7.2 G/DL (ref 6.8–8.8)
RBC # BLD AUTO: 4.94 10E12/L (ref 4.4–5.9)
SODIUM SERPL-SCNC: 141 MMOL/L (ref 133–144)
TSH SERPL DL<=0.005 MIU/L-ACNC: 1.3 MU/L (ref 0.4–4)
VIT B12 SERPL-MCNC: 228 PG/ML (ref 193–986)
WBC # BLD AUTO: 5 10E9/L (ref 4–11)

## 2017-12-15 PROCEDURE — 82306 VITAMIN D 25 HYDROXY: CPT | Performed by: FAMILY MEDICINE

## 2017-12-15 PROCEDURE — 84443 ASSAY THYROID STIM HORMONE: CPT | Performed by: FAMILY MEDICINE

## 2017-12-15 PROCEDURE — 86140 C-REACTIVE PROTEIN: CPT | Performed by: FAMILY MEDICINE

## 2017-12-15 PROCEDURE — G0008 ADMIN INFLUENZA VIRUS VAC: HCPCS | Performed by: FAMILY MEDICINE

## 2017-12-15 PROCEDURE — 36415 COLL VENOUS BLD VENIPUNCTURE: CPT | Performed by: FAMILY MEDICINE

## 2017-12-15 PROCEDURE — 85652 RBC SED RATE AUTOMATED: CPT | Performed by: FAMILY MEDICINE

## 2017-12-15 PROCEDURE — 82607 VITAMIN B-12: CPT | Performed by: FAMILY MEDICINE

## 2017-12-15 PROCEDURE — 90662 IIV NO PRSV INCREASED AG IM: CPT | Performed by: FAMILY MEDICINE

## 2017-12-15 PROCEDURE — 80053 COMPREHEN METABOLIC PANEL: CPT | Performed by: FAMILY MEDICINE

## 2017-12-15 PROCEDURE — 99214 OFFICE O/P EST MOD 30 MIN: CPT | Mod: 25 | Performed by: FAMILY MEDICINE

## 2017-12-15 PROCEDURE — 85025 COMPLETE CBC W/AUTO DIFF WBC: CPT | Performed by: FAMILY MEDICINE

## 2017-12-15 RX ORDER — TAMSULOSIN HYDROCHLORIDE 0.4 MG/1
0.4 CAPSULE ORAL 2 TIMES DAILY
Qty: 180 CAPSULE | Refills: 1 | Status: SHIPPED | OUTPATIENT
Start: 2017-12-15 | End: 2018-06-19

## 2017-12-15 RX ORDER — TRAZODONE HYDROCHLORIDE 100 MG/1
100 TABLET ORAL
Qty: 90 TABLET | Refills: 1 | Status: SHIPPED | OUTPATIENT
Start: 2017-12-15 | End: 2018-06-19

## 2017-12-15 RX ORDER — ATORVASTATIN CALCIUM 20 MG/1
20 TABLET, FILM COATED ORAL DAILY
Qty: 90 TABLET | Refills: 3 | Status: SHIPPED | OUTPATIENT
Start: 2017-12-15 | End: 2018-12-26

## 2017-12-15 RX ORDER — TRAZODONE HYDROCHLORIDE 100 MG/1
100 TABLET ORAL AT BEDTIME
COMMUNITY
End: 2018-08-01

## 2017-12-15 NOTE — MR AVS SNAPSHOT
After Visit Summary   12/15/2017    Drake Santiago    MRN: [id number]           Patient Information     Date Of Birth          1950        Visit Information        Provider Department      12/15/2017 7:00 AM Christiano Braun MD [facility]        Today's Diagnoses     Dizziness    -  1    Fatigue, unspecified type        Insomnia, unspecified type        Benign prostatic hyperplasia with lower urinary tract symptoms, symptom details unspecified        Hyperlipidemia LDL goal <130        Vitamin D deficiency        Mixed conductive and sensorineural hearing loss of both ears        Need for prophylactic vaccination and inoculation against influenza           Follow-ups after your visit        Who to contact     If you have questions or need follow up information about today's clinic visit or your schedule please contact [facility] directly at [phone].  Normal or non-critical lab and imaging results will be communicated to you by ShepHertzhart, letter or phone within 4 business days after the clinic has received the results. If you do not hear from us within 7 days, please contact the clinic through Questrat or phone. If you have a critical or abnormal lab result, we will notify you by phone as soon as possible.  Submit refill requests through Belly or call your pharmacy and they will forward the refill request to us. Please allow 3 business days for your refill to be completed.          Additional Information About Your Visit        ShepHertzharAugure Information     Belly gives you secure access to your electronic health record. If you see a primary care provider, you can also send messages to your care team and make appointments. If you have questions, please call your primary care clinic.  If you do not have a primary care provider, please call [phone] and they will assist you.        Care EveryWhere ID     This is your Care EveryWhere ID. This could be used by  "other organizations to access your [facility] medical records  [id number]        Your Vitals Were     Pulse Temperature Height Pulse Oximetry BMI (Body Mass Index)       82 97.4  F (36.3  C) (Temporal) 5' 10\" (1.778 m) 98% 28.4 kg/m2        Blood Pressure from Last 3 Encounters:   12/15/17 120/70   06/05/17 (!) 138/92   06/05/17 149/90    Weight from Last 3 Encounters:   12/15/17 197 lb 14.4 oz (89.8 kg)   06/05/17 195 lb (88.5 kg)   06/05/17 195 lb (88.5 kg)              We Performed the Following     CBC with platelets differential     Comprehensive metabolic panel (BMP + Alb, Alk Phos, ALT, AST, Total. Bili, TP)     CRP, inflammation     ESR: Erythrocyte sedimentation rate     FLU VACCINE, INCREASED ANTIGEN, PRESV FREE, AGE 65+ [78502]     TSH with free T4 reflex     Vaccine Administration, Initial [88573]     Vitamin B12     Vitamin D Deficiency          Today's Medication Changes          These changes are accurate as of: 12/15/17  9:17 AM.  If you have any questions, ask your nurse or doctor.               These medicines have changed or have updated prescriptions.        Dose/Directions    * traZODone 100 MG tablet   Commonly known as:  DESYREL   This may have changed:  Another medication with the same name was added. Make sure you understand how and when to take each.   Changed by:  Christiano Braun MD        Dose:  100 mg   Take 100 mg by mouth At Bedtime   Refills:  0       * traZODone 100 MG tablet   Commonly known as:  DESYREL   This may have changed:  You were already taking a medication with the same name, and this prescription was added. Make sure you understand how and when to take each.   Used for:  Insomnia, unspecified type   Changed by:  Christiano Braun MD        Dose:  100 mg   Take 1 tablet (100 mg) by mouth nightly as needed for sleep   Quantity:  90 tablet   Refills:  1       * Notice:  This list has 2 medication(s) that are the same as other medications prescribed for you. Read the " directions carefully, and ask your doctor or other care provider to review them with you.         Where to get your medicines      These medications were sent to [facility] - [place], [place] - [address]  [address], [facility] [address]     Phone:  [phone]     atorvastatin 20 MG tablet    tamsulosin 0.4 MG capsule    traZODone 100 MG tablet                Primary Care Provider Office Phone # Fax #    Christiano Braun [phone]       [facility] [address] [place] [address]        Equal Access to Services     [facility]: Hadii aad ku hadasho Soomaali, waaxda luqadaha, qaybta kaalmada adeegyada, waxay idiin haykimberlyn vivi hays . So [facility] [phone].    ATENCIÓN: Si habla español, tiene a quintana disposición servicios gratuitos de asistencia lingüística. [facility] [phone].    We comply with applicable federal civil rights laws and [place] laws. We do not discriminate on the basis of race, color, national origin, age, disability, sex, sexual orientation, or gender identity.            Thank you!     Thank you for choosing [facility]  for your care. Our goal is always to provide you with excellent care. Hearing back from our patients is one way we can continue to improve our services. Please take a few minutes to complete the written survey that you may receive in the mail after your visit with us. Thank you!             Your Updated Medication List - Protect others around you: Learn how to safely use, store and throw away your medicines at www.disposemymeds.org.          This list is accurate as of: 12/15/17  9:17 AM.  Always use your most recent med list.                   Brand Name Dispense Instructions for use Diagnosis    atorvastatin 20 MG tablet    LIPITOR    90 tablet    Take 1 tablet (20 mg) by mouth daily    Hyperlipidemia LDL goal <130       clindamycin 150 MG capsule    CLEOCIN    30 capsule    Take 1 capsule (150 mg) by  mouth 3 times daily    Postoperative state       DIPHENHYDRAMINE HCL PO      Take 50 mg by mouth nightly as needed        HYDROcodone-acetaminophen 5-325 MG per tablet    NORCO    10 tablet    Take 1 tablet by mouth every 6 hours as needed for pain Maximum of 4000 mg of acetaminophen in 24 hours.    Postoperative state       IBUPROFEN PO           neomycin-polymyxin-dexamethasone 3.5-42520-6.1 Susp ophthalmic susp    MAXITROL    1 Bottle    Place 1 drop into the right eye 4 times daily    Postoperative state       tamsulosin 0.4 MG capsule    FLOMAX    180 capsule    Take 1 capsule (0.4 mg) by mouth 2 times daily    Benign prostatic hyperplasia with lower urinary tract symptoms, symptom details unspecified       * traZODone 100 MG tablet    DESYREL     Take 100 mg by mouth At Bedtime        * traZODone 100 MG tablet    DESYREL    90 tablet    Take 1 tablet (100 mg) by mouth nightly as needed for sleep    Insomnia, unspecified type       * Notice:  This list has 2 medication(s) that are the same as other medications prescribed for you. Read the directions carefully, and ask your doctor or other care provider to review them with you.

## 2017-12-15 NOTE — NURSING NOTE
"Chief Complaint   Patient presents with     Recheck Medication     Flomax and lipitor        Initial /70 (BP Location: Right arm, Patient Position: Chair, Cuff Size: Adult Large)  Pulse 82  Temp 97.4  F (36.3  C) (Temporal)  Ht 5' 10\" (1.778 m)  Wt 197 lb 14.4 oz (89.8 kg)  SpO2 98%  BMI 28.4 kg/m2 Estimated body mass index is 28.4 kg/(m^2) as calculated from the following:    Height as of this encounter: 5' 10\" (1.778 m).    Weight as of this encounter: 197 lb 14.4 oz (89.8 kg).  Medication Reconciliation: complete    "

## 2017-12-15 NOTE — PROGRESS NOTES
Injectable Influenza Immunization Documentation    1.  Is the person to be vaccinated sick today?   No    2. Does the person to be vaccinated have an allergy to a component   of the vaccine?   No  Egg Allergy Algorithm Link    3. Has the person to be vaccinated ever had a serious reaction   to influenza vaccine in the past?   No    4. Has the person to be vaccinated ever had Guillain-Barré syndrome?   No    Form completed by Roseann ROPER CMA   Prior to injection verified patient identity using patient's name and date of birth.

## 2017-12-15 NOTE — PROGRESS NOTES
SUBJECTIVE:   Drake Santiago is a 67 year old male who presents to clinic today for the following health issues: Several complaints.    Patient picked up trazodone from [place] and really likes it. Would like a prescription for it.     Patient c/o low energy and c/o weight gain. Would like to discuss this. No appetite.     Patient also c/o being dizzy and feeling drunk.  He had an MRI done in [place] but nothing was found.     Medication Followup of Flomax and Lipitor     Taking Medication as prescribed: yes    Side Effects:  None    Medication Helping Symptoms:  yes             Problem list and histories reviewed & adjusted, as indicated.  Additional history: as documented        Reviewed and updated as needed this visit by clinical staff     Reviewed and updated as needed this visit by Provider        SUBJECTIVE:  Drake  is a 67 year old male who presents for:  A list of problems. When he was down in [place] he had vertigo very bad. They did an MRI and found nothing. He complains of low energy. Weight changes. No appetite. Dizzy feels like he is drunk all the time. Not related to starting the trazodone. He is just on Flomax and Lipitor and has been for a while. Denies any rashes. Denies any night sweats. Denies any cough. No head injuries. No headache. Does have a hearing deficit.    Past Medical History:   Diagnosis Date     BPH (benign prostatic hyperplasia)      Hyperlipidemia      Past Surgical History:   Procedure Laterality Date     HERNIORRHAPHY INGUINAL       REPAIR LACERATION LID Right 6/5/2017    Procedure: REPAIR LACERATION LID;  right lower lid laceration repair, right canalicular laceration repair      ;  Surgeon: Al Ac MD;  Location:  OR     Social History   Substance Use Topics     Smoking status: Former Smoker     Smokeless tobacco: Never Used     Alcohol use Yes      Comment: glass of wine nightly      Current Outpatient Prescriptions   Medication Sig Dispense Refill     traZODone  "(DESYREL) 100 MG tablet Take 100 mg by mouth At Bedtime       atorvastatin (LIPITOR) 20 MG tablet Take 1 tablet (20 mg) by mouth daily 90 tablet 3     tamsulosin (FLOMAX) 0.4 MG capsule Take 1 capsule (0.4 mg) by mouth 2 times daily 180 capsule 1     traZODone (DESYREL) 100 MG tablet Take 1 tablet (100 mg) by mouth nightly as needed for sleep 90 tablet 1     IBUPROFEN PO        DIPHENHYDRAMINE HCL PO Take 50 mg by mouth nightly as needed       HYDROcodone-acetaminophen (NORCO) 5-325 MG per tablet Take 1 tablet by mouth every 6 hours as needed for pain Maximum of 4000 mg of acetaminophen in 24 hours. (Patient not taking: Reported on 12/15/2017) 10 tablet 0     neomycin-polymyxin-dexamethasone (MAXITROL) 3.5-21635-7.1 SUSP ophthalmic susp Place 1 drop into the right eye 4 times daily (Patient not taking: Reported on 12/15/2017) 1 Bottle 0     clindamycin (CLEOCIN) 150 MG capsule Take 1 capsule (150 mg) by mouth 3 times daily (Patient not taking: Reported on 12/15/2017) 30 capsule 0     [DISCONTINUED] atorvastatin (LIPITOR) 20 MG tablet Take 1 tablet (20 mg) by mouth daily 90 tablet 3       REVIEW OF SYSTEMS:   5 point ROS negative except as noted above in HPI, including Gen., Resp, CV, GI &  system review.     OBJECTIVE:  Vitals: /70 (BP Location: Right arm, Patient Position: Chair, Cuff Size: Adult Large)  Pulse 82  Temp 97.4  F (36.3  C) (Temporal)  Ht 5' 10\" (1.778 m)  Wt 197 lb 14.4 oz (89.8 kg)  SpO2 98%  BMI 28.4 kg/m2  BMI= Body mass index is 28.4 kg/(m^2).  He is pleasant alert and oriented. Head is normocephalic. Eyes PERRLA EOMs full no nice taking this. Ears are clear. Throat clear. Neck supple no carotid bruits. No thyromegaly. Lungs are clear. Heart regular rhythm no murmur. Face is symmetrical. DTRs are 2 over 5 at the knees bilaterally and equal. His Romberg is very unsteady. Heel to toe is very unsteady.    ASSESSMENT:  #1 dizziness more like ataxia #2 fatigue #3 insomnia #4 " hyperlipidemia #5 benign prostatic hypertrophy #6 hearing loss    PLAN:  We will check a host of blood tests including B12 and vitamin D TSH CBC chemistry panel CRP ESR. We'll set him up for a neurology consult. Reordered his medication Flomax for him for urinary frequency for him.  He ordered Lipitor for hyperlipidemia also wants an audiology consult.        Christiano Braun MD  [facility]

## 2017-12-22 ENCOUNTER — TELEPHONE (OUTPATIENT)
Dept: FAMILY MEDICINE | Facility: CLINIC | Age: 67
End: 2017-12-22

## 2017-12-22 NOTE — TELEPHONE ENCOUNTER
----- Message from Christiano Braun MD sent at 12/22/2017 12:56 PM CST -----  All of your labs were fine.  Vitamin D level was normal.  B12 level was normal.  Thyroid test was normal.  2 tests for inflammation were negative.  Chemistry panel was fine .  This includes liver function kidney function tests electrolytes and blood sugar which was 100 blood count and hemoglobin were normal

## 2017-12-22 NOTE — PROGRESS NOTES
All of your labs were fine.  Vitamin D level was normal.  B12 level was normal.  Thyroid test was normal.  2 tests for inflammation were negative.  Chemistry panel was fine .  This includes liver function kidney function tests electrolytes and blood sugar which was 100 blood count and hemoglobin were normal

## 2017-12-26 NOTE — TELEPHONE ENCOUNTER
Second attempt to reach pt. Left message. [employer] message sent to pt to advise of lab results.

## 2017-12-26 NOTE — TELEPHONE ENCOUNTER
Patient called back and info was given.  Thank you,  Sonam Oviedo   for [facility]

## 2018-01-30 ENCOUNTER — TELEPHONE (OUTPATIENT)
Dept: FAMILY MEDICINE | Facility: CLINIC | Age: 68
End: 2018-01-30

## 2018-01-30 NOTE — TELEPHONE ENCOUNTER
Reason for Call:  Other     Detailed comments: Kelsey from [facility] called.  She had spoke with Drake in December and he wanted to wait a month.  She called him again on 1/17/2018.  She states she feels he does not really want an appointment and is actually apalled that he was referred without his previous MRI that was to be requested.  Kelsey is asking to talk to someone from your team regarding this.    Phone Number Patient can be reached at: Other phone number:  [phone] ext [phone]*    Best Time: any    Can we leave a detailed message on this number? YES    Call taken on 1/30/2018 at 6:49 AM by Louis Carlson

## 2018-01-30 NOTE — TELEPHONE ENCOUNTER
Drake just wanted the MRI sent to Kelsey at [facility].  This has been done.

## 2018-03-27 ENCOUNTER — TRANSFERRED RECORDS (OUTPATIENT)
Dept: HEALTH INFORMATION MANAGEMENT | Facility: CLINIC | Age: 68
End: 2018-03-27

## 2018-03-27 ENCOUNTER — OFFICE VISIT (OUTPATIENT)
Dept: AUDIOLOGY | Facility: CLINIC | Age: 68
End: 2018-03-27
Payer: MEDICARE

## 2018-03-27 ENCOUNTER — OFFICE VISIT (OUTPATIENT)
Dept: OTOLARYNGOLOGY | Facility: CLINIC | Age: 68
End: 2018-03-27
Payer: MEDICARE

## 2018-03-27 DIAGNOSIS — T16.1XXA FOREIGN BODY OF RIGHT EAR, INITIAL ENCOUNTER: Primary | ICD-10-CM

## 2018-03-27 DIAGNOSIS — Z01.118 ABNORMAL OTOSCOPIC EXAM OF RIGHT EAR: Primary | ICD-10-CM

## 2018-03-27 PROCEDURE — V5299 HEARING SERVICE: HCPCS | Performed by: AUDIOLOGIST

## 2018-03-27 PROCEDURE — 69200 CLEAR OUTER EAR CANAL: CPT | Performed by: OTOLARYNGOLOGY

## 2018-03-27 PROCEDURE — 99203 OFFICE O/P NEW LOW 30 MIN: CPT | Mod: 25 | Performed by: OTOLARYNGOLOGY

## 2018-03-27 NOTE — LETTER
3/27/2018         RE: Drake Santiago  [address] [facility] [address]        Dear Colleague,    Thank you for referring your patient, Drake Santiago, to the [facility]. Please see a copy of my visit note below.    ENT Consultation    Drake Santiago is a 67 year old male who is seen in consultation at the request of audiology.      History of Present Illness - Drake Santiago is a 67 year old male feels like he has a plugging sensation in the right ear.  He feels that in the last couple days the insert of his hearing aid that he has been wearing from for a while got stuck in the ear canal.  Also even prior to the ear felt kind of plugged.  Left ear does not bother him.  He does wear hearing aids in both ears.  With long-standing standing history of hearing loss    Past Medical History -   Past Medical History:   Diagnosis Date     BPH (benign prostatic hyperplasia)      Hyperlipidemia        Current Medications -   Current Outpatient Prescriptions:      traZODone (DESYREL) 100 MG tablet, Take 100 mg by mouth At Bedtime, Disp: , Rfl:      atorvastatin (LIPITOR) 20 MG tablet, Take 1 tablet (20 mg) by mouth daily, Disp: 90 tablet, Rfl: 3     tamsulosin (FLOMAX) 0.4 MG capsule, Take 1 capsule (0.4 mg) by mouth 2 times daily, Disp: 180 capsule, Rfl: 1     traZODone (DESYREL) 100 MG tablet, Take 1 tablet (100 mg) by mouth nightly as needed for sleep, Disp: 90 tablet, Rfl: 1     IBUPROFEN PO, , Disp: , Rfl:      DIPHENHYDRAMINE HCL PO, Take 50 mg by mouth nightly as needed, Disp: , Rfl:      HYDROcodone-acetaminophen (NORCO) 5-325 MG per tablet, Take 1 tablet by mouth every 6 hours as needed for pain Maximum of 4000 mg of acetaminophen in 24 hours. (Patient not taking: Reported on 12/15/2017), Disp: 10 tablet, Rfl: 0     neomycin-polymyxin-dexamethasone (MAXITROL) 3.5-28250-3.1 SUSP ophthalmic susp, Place 1 drop into the right eye 4 times daily (Patient not taking: Reported on 12/15/2017),  Disp: 1 Bottle, Rfl: 0     clindamycin (CLEOCIN) 150 MG capsule, Take 1 capsule (150 mg) by mouth 3 times daily (Patient not taking: Reported on 12/15/2017), Disp: 30 capsule, Rfl: 0    Allergies -   Allergies   Allergen Reactions     Bees Anaphylaxis     Penicillins Anaphylaxis       Social History -   Social History     Social History     Marital status: Single     Spouse name: N/A     Number of children: N/A     Years of education: N/A     Social History Main Topics     Smoking status: Former Smoker     Smokeless tobacco: Never Used     Alcohol use Yes      Comment: glass of wine nightly      Drug use: No     Sexual activity: Not Currently     Partners: Female     Other Topics Concern     Not on file     Social History Narrative       Family History -   Family History   Problem Relation Age of Onset     Glaucoma No family hx of      Macular Degeneration No family hx of        Review of Systems - As per HPI and PMHx, otherwise review of system review of the head and neck negative.    Physical Exam  There were no vitals taken for this visit.  BMI: There is no height or weight on file to calculate BMI.    General - The patient is well nourished and well developed, and appears to have good nutritional status.  Alert and oriented to person and place, answers questions and cooperates with examination appropriately.    SKIN - No suspicious lesions or rashes.  Respiration - No respiratory distress.     Head and Face - Normocephalic and atraumatic, with no gross asymmetry noted of the contour of the facial features.  The facial nerve is intact, with strong symmetric movements.    Voice and Breathing - The patient was breathing comfortably without the use of accessory muscles. There was no wheezing, stridor, or stertor.  The patients voice was clear and strong, and had appropriate pitch and quality.    Ears - Bilateral pinna and EACs with normal appearing overlying skin.  Left tympanic membrane intact with good mobility on  pneumatic otoscopy bilaterally. Bony landmarks of the ossicular chain are normal.  On the right only a large amount of cerumen was appreciated deep in the canal.    No fluid or purulence was seen in the external canal or the middle ear.     Eyes - Extraocular movements intact.  Sclera were not icteric or injected, conjunctiva were pink and moist.      Nose - External contour is symmetric, no gross deflection or scars.  Nasal mucosa is pink and moist with no abnormal mucus.  The septum was midline and non-obstructive, turbinates of normal size and position.  No polyps, masses, or purulence noted on examination.    Neuro - Nonfocal neuro exam is normal, CN 2 through 12 intact, normal gait and muscle tone.    Performed in clinic today:  On the right ear I used the small alligator forceps to remove hearing insert from the mid canal.  Once this was removed appreciate significant amount of cerumen and attempts to touch cerumen produced a lot of discomfort.          A/P - Drake Santiago is a 67 year old male with foreign body in the right external ear canals with significant cerumen impaction.  Foreign body was removed with cerumen could not be removed.  Patient is advised to use hydrogen peroxide and water for couple weeks to see if he can liquefy cerumen he will see his back in a few weeks .      Joseph Perkins MD    Again, thank you for allowing me to participate in the care of your patient.        Sincerely,        Joseph Perkins MD, MD

## 2018-03-27 NOTE — MR AVS SNAPSHOT
After Visit Summary   3/27/2018    rDake Santiago    MRN: [id number]           Patient Information     Date Of Birth          1950        Visit Information        Provider Department      3/27/2018 10:30 AM Joseph Perkins MD [facility]        Today's Diagnoses     Foreign body of right ear, initial encounter    -  1       Follow-ups after your visit        Who to contact     If you have questions or need follow up information about today's clinic visit or your schedule please contact [facility] directly at [phone].  Normal or non-critical lab and imaging results will be communicated to you by [employer], letter or phone within 4 business days after the clinic has received the results. If you do not hear from us within 7 days, please contact the clinic through [employer] TV[employer] or phone. If you have a critical or abnormal lab result, we will notify you by phone as soon as possible.  Submit refill requests through [employer] or call your pharmacy and they will forward the refill request to us. Please allow 3 business days for your refill to be completed.          Additional Information About Your Visit        MyChart Information     [employer] gives you secure access to your electronic health record. If you see a primary care provider, you can also send messages to your care team and make appointments. If you have questions, please call your primary care clinic.  If you do not have a primary care provider, please call [phone] and they will assist you.        Care EveryWhere ID     This is your Care EveryWhere ID. This could be used by other organizations to access your [place] medical records  [id number]         Blood Pressure from Last 3 Encounters:   12/15/17 120/70   06/05/17 (!) 138/92   06/05/17 149/90    Weight from Last 3 Encounters:   12/15/17 89.8 kg (197 lb 14.4 oz)   06/05/17 88.5 kg (195 lb)   06/05/17 88.5 kg (195 lb)              We Performed the  Following     REMOVE FB, EXT AUDITORY CANAL        Primary Care Provider Office Phone # Fax #    Christiano Braun [phone]       [address] [facility] [address]        Equal Access to Services     MARYANNE TILLMAN : Hadana elizabeth ku katerino Socarlaali, waaxda luqadaha, qaybta kaalmada adeegyada, rangel tompkinsn vivi ravi lis root. So [facility] [phone].    ATENCIÓN: Si habla español, tiene a quintana disposición servicios gratuitos de asistencia lingüística. Llame al [phone].    We comply with applicable federal civil rights laws and [place] laws. We do not discriminate on the basis of race, color, national origin, age, disability, sex, sexual orientation, or gender identity.            Thank you!     Thank you for choosing [facility]  for your care. Our goal is always to provide you with excellent care. Hearing back from our patients is one way we can continue to improve our services. Please take a few minutes to complete the written survey that you may receive in the mail after your visit with us. Thank you!             Your Updated Medication List - Protect others around you: Learn how to safely use, store and throw away your medicines at www.disposemymeds.org.          This list is accurate as of 3/27/18 12:12 PM.  Always use your most recent med list.                   Brand Name Dispense Instructions for use Diagnosis    atorvastatin 20 MG tablet    LIPITOR    90 tablet    Take 1 tablet (20 mg) by mouth daily    Hyperlipidemia LDL goal <130       clindamycin 150 MG capsule    CLEOCIN    30 capsule    Take 1 capsule (150 mg) by mouth 3 times daily    Postoperative state       DIPHENHYDRAMINE HCL PO      Take 50 mg by mouth nightly as needed        HYDROcodone-acetaminophen 5-325 MG per tablet    NORCO    10 tablet    Take 1 tablet by mouth every 6 hours as needed for pain Maximum of 4000 mg of acetaminophen in 24 hours.    Postoperative state       IBUPROFEN PO            neomycin-polymyxin-dexamethasone 3.5-59791-5.1 Susp ophthalmic susp    MAXITROL    1 Bottle    Place 1 drop into the right eye 4 times daily    Postoperative state       tamsulosin 0.4 MG capsule    FLOMAX    180 capsule    Take 1 capsule (0.4 mg) by mouth 2 times daily    Benign prostatic hyperplasia with lower urinary tract symptoms, symptom details unspecified       * traZODone 100 MG tablet    DESYREL     Take 100 mg by mouth At Bedtime        * traZODone 100 MG tablet    DESYREL    90 tablet    Take 1 tablet (100 mg) by mouth nightly as needed for sleep    Insomnia, unspecified type       * Notice:  This list has 2 medication(s) that are the same as other medications prescribed for you. Read the directions carefully, and ask your doctor or other care provider to review them with you.

## 2018-03-27 NOTE — MR AVS SNAPSHOT
After Visit Summary   3/27/2018    Drake Santiago    MRN: [id number]           Patient Information     Date Of Birth          1950        Visit Information        Provider Department      3/27/2018 9:30 AM Beatriz Fisher [facility]        Today's Diagnoses     Abnormal otoscopic exam of right ear    -  1       Follow-ups after your visit        Who to contact     If you have questions or need follow up information about today's clinic visit or your schedule please contact [facility] directly at [phone].  Normal or non-critical lab and imaging results will be communicated to you by [email or web address], letter or phone within 4 business days after the clinic has received the results. If you do not hear from us within 7 days, please contact the clinic through [employer] or phone. If you have a critical or abnormal lab result, we will notify you by phone as soon as possible.  Submit refill requests through [employer] or call your pharmacy and they will forward the refill request to us. Please allow 3 business days for your refill to be completed.          Additional Information About Your Visit        MyChart Information     [employer] gives you secure access to your electronic health record. If you see a primary care provider, you can also send messages to your care team and make appointments. If you have questions, please call your primary care clinic.  If you do not have a primary care provider, please call [phone] and they will assist you.        Care EveryWhere ID     This is your Care EveryWhere ID. This could be used by other organizations to access your [place] medical records  [id number]         Blood Pressure from Last 3 Encounters:   12/15/17 120/70   06/05/17 (!) 138/92   06/05/17 149/90    Weight from Last 3 Encounters:   12/15/17 197 lb 14.4 oz (89.8 kg)   06/05/17 195 lb (88.5 kg)   06/05/17 195 lb (88.5 kg)              We Performed the Following      HEARING AID CHECK/NO CHARGE        Primary Care Provider Office Phone # Fax #    Christiano Braun [phone]       [address] [facility] [address]        Equal Access to Services     MARYANNE TILLMAN : Jb escobedo katerino Socarlaali, waaxda luqadaha, qaybta kaalmada adeegyada, rangel ravi lis root. So [facility] [phone].    ATENCIÓN: Si habla español, tiene a quintana disposición servicios gratuitos de asistencia lingüística. [facility] [phone].    We comply with applicable federal civil rights laws and [place] laws. We do not discriminate on the basis of race, color, national origin, age, disability, sex, sexual orientation, or gender identity.            Thank you!     Thank you for choosing [facility]  for your care. Our goal is always to provide you with excellent care. Hearing back from our patients is one way we can continue to improve our services. Please take a few minutes to complete the written survey that you may receive in the mail after your visit with us. Thank you!             Your Updated Medication List - Protect others around you: Learn how to safely use, store and throw away your medicines at www.disposemymeds.org.          This list is accurate as of 3/27/18 10:59 AM.  Always use your most recent med list.                   Brand Name Dispense Instructions for use Diagnosis    atorvastatin 20 MG tablet    LIPITOR    90 tablet    Take 1 tablet (20 mg) by mouth daily    Hyperlipidemia LDL goal <130       clindamycin 150 MG capsule    CLEOCIN    30 capsule    Take 1 capsule (150 mg) by mouth 3 times daily    Postoperative state       DIPHENHYDRAMINE HCL PO      Take 50 mg by mouth nightly as needed        HYDROcodone-acetaminophen 5-325 MG per tablet    NORCO    10 tablet    Take 1 tablet by mouth every 6 hours as needed for pain Maximum of 4000 mg of acetaminophen in 24 hours.    Postoperative state       IBUPROFEN PO            neomycin-polymyxin-dexamethasone 3.5-93420-4.1 Susp ophthalmic susp    MAXITROL    1 Bottle    Place 1 drop into the right eye 4 times daily    Postoperative state       tamsulosin 0.4 MG capsule    FLOMAX    180 capsule    Take 1 capsule (0.4 mg) by mouth 2 times daily    Benign prostatic hyperplasia with lower urinary tract symptoms, symptom details unspecified       * traZODone 100 MG tablet    DESYREL     Take 100 mg by mouth At Bedtime        * traZODone 100 MG tablet    DESYREL    90 tablet    Take 1 tablet (100 mg) by mouth nightly as needed for sleep    Insomnia, unspecified type       * Notice:  This list has 2 medication(s) that are the same as other medications prescribed for you. Read the directions carefully, and ask your doctor or other care provider to review them with you.

## 2018-03-27 NOTE — PROGRESS NOTES
AUDIOLOGY REPORT    SUBJECTIVE:  Drake Santiago is a 67 year old male who was seen in the Audiology Clinic at the [facility] for audiologic evaluation, referred by Dr. JUDSON Braun patient reports a concern regarding decreased hearing and missing conversation in quiet but especially in group situations. The patient denies bilateral tinnitus.  The patient has a neurology consult today for vertigo/dysequilibrium.  The patient notes difficulty with communication in a variety of listening situations.  They were accompanied today by their self.    OBJECTIVE:  Otoscopic exam indicates foreign body ( in the ear hearing aid dome)  in the right ear left ear clear of cerumen and tympanic membrane visible.    Attempted removal of the the dome from the ear using a lighted curette and alligator/forcepts removal could not be completed.    ASSESSMENT:     Foreign body right ear.    PLAN:  It is recommended that the patient see ENT for removal, an appointment was added for the patient later this morning.  This evaluation was rescheduled.  Please call this clinic with questions regarding these results or recommendations.        Andi Geronimo.  [place] Licensed Audiologist #[id number]

## 2018-03-27 NOTE — PROGRESS NOTES
ENT Consultation    Drake Santiago is a 67 year old male who is seen in consultation at the request of audiology.      History of Present Illness - Drake Santiago is a 67 year old male feels like he has a plugging sensation in the right ear.  He feels that in the last couple days the insert of his hearing aid that he has been wearing from for a while got stuck in the ear canal.  Also even prior to the ear felt kind of plugged.  Left ear does not bother him.  He does wear hearing aids in both ears.  With long-standing standing history of hearing loss    Past Medical History -   Past Medical History:   Diagnosis Date     BPH (benign prostatic hyperplasia)      Hyperlipidemia        Current Medications -   Current Outpatient Prescriptions:      traZODone (DESYREL) 100 MG tablet, Take 100 mg by mouth At Bedtime, Disp: , Rfl:      atorvastatin (LIPITOR) 20 MG tablet, Take 1 tablet (20 mg) by mouth daily, Disp: 90 tablet, Rfl: 3     tamsulosin (FLOMAX) 0.4 MG capsule, Take 1 capsule (0.4 mg) by mouth 2 times daily, Disp: 180 capsule, Rfl: 1     traZODone (DESYREL) 100 MG tablet, Take 1 tablet (100 mg) by mouth nightly as needed for sleep, Disp: 90 tablet, Rfl: 1     IBUPROFEN PO, , Disp: , Rfl:      DIPHENHYDRAMINE HCL PO, Take 50 mg by mouth nightly as needed, Disp: , Rfl:      HYDROcodone-acetaminophen (NORCO) 5-325 MG per tablet, Take 1 tablet by mouth every 6 hours as needed for pain Maximum of 4000 mg of acetaminophen in 24 hours. (Patient not taking: Reported on 12/15/2017), Disp: 10 tablet, Rfl: 0     neomycin-polymyxin-dexamethasone (MAXITROL) 3.5-69390-1.1 SUSP ophthalmic susp, Place 1 drop into the right eye 4 times daily (Patient not taking: Reported on 12/15/2017), Disp: 1 Bottle, Rfl: 0     clindamycin (CLEOCIN) 150 MG capsule, Take 1 capsule (150 mg) by mouth 3 times daily (Patient not taking: Reported on 12/15/2017), Disp: 30 capsule, Rfl: 0    Allergies -   Allergies   Allergen Reactions     Bees  Anaphylaxis     Penicillins Anaphylaxis       Social History -   Social History     Social History     Marital status: Single     Spouse name: N/A     Number of children: N/A     Years of education: N/A     Social History Main Topics     Smoking status: Former Smoker     Smokeless tobacco: Never Used     Alcohol use Yes      Comment: glass of wine nightly      Drug use: No     Sexual activity: Not Currently     Partners: Female     Other Topics Concern     Not on file     Social History Narrative       Family History -   Family History   Problem Relation Age of Onset     Glaucoma No family hx of      Macular Degeneration No family hx of        Review of Systems - As per HPI and PMHx, otherwise review of system review of the head and neck negative.    Physical Exam  There were no vitals taken for this visit.  BMI: There is no height or weight on file to calculate BMI.    General - The patient is well nourished and well developed, and appears to have good nutritional status.  Alert and oriented to person and place, answers questions and cooperates with examination appropriately.    SKIN - No suspicious lesions or rashes.  Respiration - No respiratory distress.     Head and Face - Normocephalic and atraumatic, with no gross asymmetry noted of the contour of the facial features.  The facial nerve is intact, with strong symmetric movements.    Voice and Breathing - The patient was breathing comfortably without the use of accessory muscles. There was no wheezing, stridor, or stertor.  The patients voice was clear and strong, and had appropriate pitch and quality.    Ears - Bilateral pinna and EACs with normal appearing overlying skin.  Left tympanic membrane intact with good mobility on pneumatic otoscopy bilaterally. Bony landmarks of the ossicular chain are normal.  On the right only a large amount of cerumen was appreciated deep in the canal.    No fluid or purulence was seen in the external canal or the middle ear.      Eyes - Extraocular movements intact.  Sclera were not icteric or injected, conjunctiva were pink and moist.      Nose - External contour is symmetric, no gross deflection or scars.  Nasal mucosa is pink and moist with no abnormal mucus.  The septum was midline and non-obstructive, turbinates of normal size and position.  No polyps, masses, or purulence noted on examination.    Neuro - Nonfocal neuro exam is normal, CN 2 through 12 intact, normal gait and muscle tone.    Performed in clinic today:  On the right ear I used the small alligator forceps to remove hearing insert from the mid canal.  Once this was removed appreciate significant amount of cerumen and attempts to touch cerumen produced a lot of discomfort.          A/P - Drake Santiago is a 67 year old male with foreign body in the right external ear canals with significant cerumen impaction.  Foreign body was removed with cerumen could not be removed.  Patient is advised to use hydrogen peroxide and water for couple weeks to see if he can liquefy cerumen he will see his back in a few weeks .      Joseph Perkins MD

## 2018-03-29 ENCOUNTER — OFFICE VISIT (OUTPATIENT)
Dept: AUDIOLOGY | Facility: CLINIC | Age: 68
End: 2018-03-29
Payer: MEDICARE

## 2018-03-29 DIAGNOSIS — H90.3 SENSORINEURAL HEARING LOSS, BILATERAL: Primary | ICD-10-CM

## 2018-03-29 PROCEDURE — 92567 TYMPANOMETRY: CPT | Performed by: AUDIOLOGIST

## 2018-03-29 PROCEDURE — 92557 COMPREHENSIVE HEARING TEST: CPT | Performed by: AUDIOLOGIST

## 2018-03-29 NOTE — PROGRESS NOTES
AUDIOLOGY REPORT    SUBJECTIVE:  Drake Santiago is a 67 year old male who was seen in the Audiology Clinic at the [facility] for audiologic evaluation, referred by Dr. JUDSON Braun patient reports a concern regarding decreased hearing and missing conversation in quiet but especially in group situations. The patient denies bilateral tinnitus.  The patient was seen by Dr. Perkins 3/27/2018 for removal of a  in the ear hearing aid dome in the right ear, after removal signficant cerumen was observed.  Removal attempted discontinued due to discomfort.  Dr. Perkins recommended use of mix of hydrogen peroxide and water to soften and loosen the wax before follow up in 2-3 weeks.  The patient notes difficulty with communication in a variety of listening situations.  They were accompanied today by their self.    OBJECTIVE:  Otoscopic exam indicates cerumen in the right ear and clear left ear canal with visible landmarks.    Pure Tone Thresholds assessed using conventional audiometry with good  reliability from 250-8000 Hz bilaterally using circumaural headphones     RIGHT:  mild and high frequency sensorineural hearing loss    LEFT:    mild, moderate and high frequency sensorineural hearing loss    Tympanogram:    RIGHT: normal eardrum mobility    LEFT:   normal eardrum mobility    Reflexes (reported by stimulus ear):  RIGHT: Ipsilateral is did not test (tympanometry was uncomfortable in the right ear attempt after wax removed)  RIGHT: Contralateral is (tympanometry was uncomfortable in the right ear attempt after wax removed)  LEFT:   Ipsilateral is (tympanometry was uncomfortable in the right ear attempt after wax removed)  LEFT:   Contralateral is (tympanometry was uncomfortable in the right ear attempt after wax removed)      Speech Reception Threshold:    RIGHT: 15 dB HL    LEFT:   20 dB HL  Word Recognition Score:     RIGHT: 100% at 55 dB HL using NU-6  recorded word list.    LEFT:   100% at 60 dB HL using NU-6 recorded word list.    The patient brought in existing hearing aids [employer] Voz 17  in the hearing aids (#3 40 right and left receivers).  Biologic listening check revealed the devices were working.  Programming software was not available for programming (it is possible the devices are locked and can only be programmed by a [employer] facility).  Real ear measures at current user settings revealed a good match to NAL-NL1 speech target with a 65 dB input from 250 Hz to 3000 Hz (with slight under amplification at 1000 Hz and 4000 Hz right) and a good match to targets from 250 Hz to 2000 Hz (with under amplification at 1000 Hz and from 3000 Hz to 4000 Hz left).      ASSESSMENT:   Sensorineural hearing loss bilaterally, left ear worse than the right ear 3000 Hz to 8000 Hz for tones only.    Today s results were discussed with the patient in detail.     PLAN:  Patient was counseled regarding hearing loss and impact on communication.  It is recommended that the patient follow up with Dr. Perkins as scheduled for wax removal.  The patient will be advised if it is possible to reprogram his current hearing aids at this clinic or if he needs to visit a different faciltiy (waiting for a response from the ).  Please call this clinic with questions regarding these results or recommendations.        Robin Geronimo Licensed Audiologist #[id number]

## 2018-03-29 NOTE — MR AVS SNAPSHOT
After Visit Summary   3/29/2018    Drake Santiago    MRN: [id number]           Patient Information     Date Of Birth          1950        Visit Information        Provider Department      3/29/2018 9:00 AM Beatriz Fisher AuD [facility]        Today's Diagnoses     Sensorineural hearing loss, bilateral    -  1       Follow-ups after your visit        Your next 10 appointments already scheduled     Apr 02, 2018  8:00 AM CDT   (Arrive by 7:45 AM)   MR BRAIN W/O & W CONTRAST with PHMR1   [facility] ([facility])    [address] [place] [id number] [phone]           Take your medicines as usual, unless your doctor tells you not to. Bring a list of your current medicines to your exam (including vitamins, minerals and over-the-counter drugs).  You may or may not receive intravenous (IV) contrast for this exam pending the discretion of the Radiologist.  You do not need to do anything special to prepare.  The MRI machine uses a strong magnet. Please wear clothes without metal (snaps, zippers). A sweatsuit works well, or we may give you a hospital gown.  Please remove any body piercings and hair extensions before you arrive. You will also remove watches, jewelry, hairpins, wallets, dentures, partial dental plates and hearing aids. You may wear contact lenses, and you may be able to wear your rings. We have a safe place to keep your personal items, but it is safer to leave them at home.  **IMPORTANT** THE INSTRUCTIONS BELOW ARE ONLY FOR THOSE PATIENTS WHO HAVE BEEN PRESCRIBED SEDATION OR GENERAL ANESTHESIA DURING THEIR MRI PROCEDURE:  IF YOUR DOCTOR PRESCRIBED ORAL SEDATION (take medicine to help you relax during your exam):   You must get the medicine from your doctor (oral medication) before you arrive. Bring the medicine to the exam. Do not take it at home. You ll be told when to take it upon arriving for your exam.   Arrive one  hour early. Bring someone who can take you home after the test. Your medicine will make you sleepy. After the exam, you may not drive, take a bus or take a taxi by yourself.  IF YOUR DOCTOR PRESCRIBED IV SEDATION:   Arrive one hour early. Bring someone who can take you home after the test. Your medicine will make you sleepy. After the exam, you may not drive, take a bus or take a taxi by yourself.   No eating 6 hours before your exam. You may have clear liquids up until 4 hours before your exam. (Clear liquids include water, clear tea, black coffee and fruit juice without pulp.)  IF YOUR DOCTOR PRESCRIBED ANESTHESIA (be asleep for your exam):   Arrive 1 1/2 hours early. Bring someone who can take you home after the test. You may not drive, take a bus or take a taxi by yourself.   No eating 8 hours before your exam. You may have clear liquids up until 4 hours before your exam. (Clear liquids include water, clear tea, black coffee and fruit juice without pulp.)   You will spend four to five hours in the recovery room.  Please call the Imaging Department at your exam site with any questions.            Apr 02, 2018  8:45 AM CDT   (Arrive by 8:30 AM)   MR CERVICAL SPINE W/O & W CONTRAST with PHMR1   [facility] ([facility])    [address] [place] [address] [phone]           Take your medicines as usual, unless your doctor tells you not to. Bring a list of your current medicines to your exam (including vitamins, minerals and over-the-counter drugs).  You may or may not receive intravenous (IV) contrast for this exam pending the discretion of the Radiologist.  You do not need to do anything special to prepare.  The MRI machine uses a strong magnet. Please wear clothes without metal (snaps, zippers). A sweatsuit works well, or we may give you a hospital gown.  Please remove any body piercings and hair extensions before you arrive. You will also remove watches, jewelry,  hairpins, wallets, dentures, partial dental plates and hearing aids. You may wear contact lenses, and you may be able to wear your rings. We have a safe place to keep your personal items, but it is safer to leave them at home.  **IMPORTANT** THE INSTRUCTIONS BELOW ARE ONLY FOR THOSE PATIENTS WHO HAVE BEEN PRESCRIBED SEDATION OR GENERAL ANESTHESIA DURING THEIR MRI PROCEDURE:  IF YOUR DOCTOR PRESCRIBED ORAL SEDATION (take medicine to help you relax during your exam):   You must get the medicine from your doctor (oral medication) before you arrive. Bring the medicine to the exam. Do not take it at home. You ll be told when to take it upon arriving for your exam.   Arrive one hour early. Bring someone who can take you home after the test. Your medicine will make you sleepy. After the exam, you may not drive, take a bus or take a taxi by yourself.  IF YOUR DOCTOR PRESCRIBED IV SEDATION:   Arrive one hour early. Bring someone who can take you home after the test. Your medicine will make you sleepy. After the exam, you may not drive, take a bus or take a taxi by yourself.   No eating 6 hours before your exam. You may have clear liquids up until 4 hours before your exam. (Clear liquids include water, clear tea, black coffee and fruit juice without pulp.)  IF YOUR DOCTOR PRESCRIBED ANESTHESIA (be asleep for your exam):   Arrive 1 1/2 hours early. Bring someone who can take you home after the test. You may not drive, take a bus or take a taxi by yourself.   No eating 8 hours before your exam. You may have clear liquids up until 4 hours before your exam. (Clear liquids include water, clear tea, black coffee and fruit juice without pulp.)   You will spend four to five hours in the recovery room.  Please call the Imaging Department at your exam site with any questions.              Who to contact     If you have questions or need follow up information about today's clinic visit or your schedule please contact [facility] at [phone].  Normal or non-critical lab and imaging results will be communicated to you by MyChart, letter or phone within 4 business days after the clinic has received the results. If you do not hear from us within 7 days, please contact the clinic through myEDmatchhart or phone. If you have a critical or abnormal lab result, we will notify you by phone as soon as possible.  Submit refill requests through [employer] or call your pharmacy and they will forward the refill request to us. Please allow 3 business days for your refill to be completed.          Additional Information About Your Visit        myEDmatchhart Information     [employer] gives you secure access to your electronic health record. If you see a primary care provider, you can also send messages to your care team and make appointments. If you have questions, please call your primary care clinic.  If you do not have a primary care provider, please call [phone] and they will assist you.        Care EveryWhere ID     This is your Care EveryWhere ID. This could be used by other organizations to access your [facility] medical records  [id number]         Blood Pressure from Last 3 Encounters:   12/15/17 120/70   06/05/17 (!) 138/92   06/05/17 149/90    Weight from Last 3 Encounters:   12/15/17 197 lb 14.4 oz (89.8 kg)   06/05/17 195 lb (88.5 kg)   06/05/17 195 lb (88.5 kg)              We Performed the Following     AUDIOGRAM/TYMPANOGRAM - INTERFACE     COMPREHENSIVE HEARING TEST     TYMPANOMETRY        Primary Care Provider Office Phone # Fax #    Christiano Braun [phone]       [address] [facility] [address]        Equal Access to Services     [facility]: Hadii elizabeth escobedo hadasho Socarlaali, waaxda luqadaha, qaybta kaalmada monique, rangel root. So [facility] [phone].    ATENCIÓN: Si habla español, tiene a quintana disposición servicios gratuitos de asistencia lingüística. Llame al  [phone].    We comply with applicable federal civil rights laws and [place] laws. We do not discriminate on the basis of race, color, national origin, age, disability, sex, sexual orientation, or gender identity.            Thank you!     Thank you for choosing [facility]  for your care. Our goal is always to provide you with excellent care. Hearing back from our patients is one way we can continue to improve our services. Please take a few minutes to complete the written survey that you may receive in the mail after your visit with us. Thank you!             Your Updated Medication List - Protect others around you: Learn how to safely use, store and throw away your medicines at www.disposemymeds.org.          This list is accurate as of 3/29/18 11:06 AM.  Always use your most recent med list.                   Brand Name Dispense Instructions for use Diagnosis    atorvastatin 20 MG tablet    LIPITOR    90 tablet    Take 1 tablet (20 mg) by mouth daily    Hyperlipidemia LDL goal <130       clindamycin 150 MG capsule    CLEOCIN    30 capsule    Take 1 capsule (150 mg) by mouth 3 times daily    Postoperative state       DIPHENHYDRAMINE HCL PO      Take 50 mg by mouth nightly as needed        HYDROcodone-acetaminophen 5-325 MG per tablet    NORCO    10 tablet    Take 1 tablet by mouth every 6 hours as needed for pain Maximum of 4000 mg of acetaminophen in 24 hours.    Postoperative state       IBUPROFEN PO           neomycin-polymyxin-dexamethasone 3.5-40849-5.1 Susp ophthalmic susp    MAXITROL    1 Bottle    Place 1 drop into the right eye 4 times daily    Postoperative state       tamsulosin 0.4 MG capsule    FLOMAX    180 capsule    Take 1 capsule (0.4 mg) by mouth 2 times daily    Benign prostatic hyperplasia with lower urinary tract symptoms, symptom details unspecified       * traZODone 100 MG tablet    DESYREL     Take 100 mg by mouth At Bedtime        * traZODone 100 MG tablet    DESYREL     90 tablet    Take 1 tablet (100 mg) by mouth nightly as needed for sleep    Insomnia, unspecified type       * Notice:  This list has 2 medication(s) that are the same as other medications prescribed for you. Read the directions carefully, and ask your doctor or other care provider to review them with you.

## 2018-04-02 ENCOUNTER — HOSPITAL ENCOUNTER (OUTPATIENT)
Dept: MRI IMAGING | Facility: CLINIC | Age: 68
End: 2018-04-02
Attending: PSYCHIATRY & NEUROLOGY
Payer: MEDICARE

## 2018-04-02 ENCOUNTER — HOSPITAL ENCOUNTER (OUTPATIENT)
Dept: MRI IMAGING | Facility: CLINIC | Age: 68
Discharge: HOME | End: 2018-04-02
Attending: PSYCHIATRY & NEUROLOGY | Admitting: PSYCHIATRY & NEUROLOGY
Payer: MEDICARE

## 2018-04-02 DIAGNOSIS — R27.0 ATAXIA: ICD-10-CM

## 2018-04-02 DIAGNOSIS — R42 DIZZINESS: ICD-10-CM

## 2018-04-02 PROCEDURE — A9585 GADOBUTROL INJECTION: HCPCS | Performed by: PSYCHIATRY & NEUROLOGY

## 2018-04-02 PROCEDURE — 70553 MRI BRAIN STEM W/O & W/DYE: CPT

## 2018-04-02 PROCEDURE — 25000128 H RX IP 250 OP 636: Performed by: PSYCHIATRY & NEUROLOGY

## 2018-04-02 PROCEDURE — 72156 MRI NECK SPINE W/O & W/DYE: CPT

## 2018-04-02 RX ORDER — GADOBUTROL 604.72 MG/ML
10 INJECTION INTRAVENOUS ONCE
Status: COMPLETED | OUTPATIENT
Start: 2018-04-02 | End: 2018-04-02

## 2018-04-02 RX ADMIN — GADOBUTROL 9 ML: 604.72 INJECTION INTRAVENOUS at 09:15

## 2018-07-25 ENCOUNTER — APPOINTMENT (OUTPATIENT)
Dept: GENERAL RADIOLOGY | Facility: CLINIC | Age: 68
End: 2018-07-25
Attending: FAMILY MEDICINE
Payer: MEDICARE

## 2018-07-25 ENCOUNTER — TELEPHONE (OUTPATIENT)
Dept: FAMILY MEDICINE | Facility: CLINIC | Age: 68
End: 2018-07-25

## 2018-07-25 ENCOUNTER — HOSPITAL ENCOUNTER (EMERGENCY)
Facility: CLINIC | Age: 68
Discharge: HOME | End: 2018-07-25
Attending: FAMILY MEDICINE | Admitting: FAMILY MEDICINE
Payer: MEDICARE

## 2018-07-25 VITALS
SYSTOLIC BLOOD PRESSURE: 138 MMHG | RESPIRATION RATE: 13 BRPM | DIASTOLIC BLOOD PRESSURE: 89 MMHG | OXYGEN SATURATION: 95 %

## 2018-07-25 DIAGNOSIS — R07.89 ATYPICAL CHEST PAIN: ICD-10-CM

## 2018-07-25 DIAGNOSIS — F41.9 ANXIETY: ICD-10-CM

## 2018-07-25 LAB
ALBUMIN SERPL-MCNC: 4 G/DL (ref 3.4–5)
ALP SERPL-CCNC: 79 U/L (ref 40–150)
ALT SERPL W P-5'-P-CCNC: 25 U/L (ref 0–70)
ANION GAP SERPL CALCULATED.3IONS-SCNC: 10 MMOL/L (ref 3–14)
AST SERPL W P-5'-P-CCNC: 21 U/L (ref 0–45)
BASOPHILS # BLD AUTO: 0 10E9/L (ref 0–0.2)
BASOPHILS NFR BLD AUTO: 0.4 %
BILIRUB SERPL-MCNC: 1.1 MG/DL (ref 0.2–1.3)
BUN SERPL-MCNC: 24 MG/DL (ref 7–30)
CALCIUM SERPL-MCNC: 8.9 MG/DL (ref 8.5–10.1)
CHLORIDE SERPL-SCNC: 106 MMOL/L (ref 94–109)
CO2 SERPL-SCNC: 27 MMOL/L (ref 20–32)
CREAT SERPL-MCNC: 1.2 MG/DL (ref 0.66–1.25)
DIFFERENTIAL METHOD BLD: NORMAL
EOSINOPHIL NFR BLD AUTO: 0.3 %
ERYTHROCYTE [DISTWIDTH] IN BLOOD BY AUTOMATED COUNT: 13.4 % (ref 10–15)
GFR SERPL CREATININE-BSD FRML MDRD: 61 ML/MIN/1.7M2
GLUCOSE SERPL-MCNC: 118 MG/DL (ref 70–99)
HCT VFR BLD AUTO: 45.9 % (ref 40–53)
HGB BLD-MCNC: 15.4 G/DL (ref 13.3–17.7)
IMM GRANULOCYTES # BLD: 0 10E9/L (ref 0–0.4)
IMM GRANULOCYTES NFR BLD: 0.4 %
LIPASE SERPL-CCNC: 185 U/L (ref 73–393)
LYMPHOCYTES # BLD AUTO: 1.3 10E9/L (ref 0.8–5.3)
LYMPHOCYTES NFR BLD AUTO: 18.4 %
MCH RBC QN AUTO: 30.1 PG (ref 26.5–33)
MCHC RBC AUTO-ENTMCNC: 33.6 G/DL (ref 31.5–36.5)
MCV RBC AUTO: 90 FL (ref 78–100)
MONOCYTES # BLD AUTO: 0.5 10E9/L (ref 0–1.3)
MONOCYTES NFR BLD AUTO: 7.1 %
NEUTROPHILS # BLD AUTO: 5.2 10E9/L (ref 1.6–8.3)
NEUTROPHILS NFR BLD AUTO: 73.4 %
NRBC # BLD AUTO: 0 10*3/UL
NRBC BLD AUTO-RTO: 0 /100
NT-PROBNP SERPL-MCNC: 62 PG/ML (ref 0–900)
PLATELET # BLD AUTO: 263 10E9/L (ref 150–450)
POTASSIUM SERPL-SCNC: 3.3 MMOL/L (ref 3.4–5.3)
PROT SERPL-MCNC: 7.8 G/DL (ref 6.8–8.8)
RBC # BLD AUTO: 5.12 10E12/L (ref 4.4–5.9)
SODIUM SERPL-SCNC: 143 MMOL/L (ref 133–144)
TROPONIN I SERPL-MCNC: <0.015 UG/L (ref 0–0.04)
TSH SERPL DL<=0.005 MIU/L-ACNC: 0.64 MU/L (ref 0.4–4)
WBC # BLD AUTO: 7.1 10E9/L (ref 4–11)

## 2018-07-25 PROCEDURE — 99285 EMERGENCY DEPT VISIT HI MDM: CPT | Mod: 25 | Performed by: FAMILY MEDICINE

## 2018-07-25 PROCEDURE — 83880 ASSAY OF NATRIURETIC PEPTIDE: CPT | Performed by: FAMILY MEDICINE

## 2018-07-25 PROCEDURE — 84484 ASSAY OF TROPONIN QUANT: CPT | Performed by: FAMILY MEDICINE

## 2018-07-25 PROCEDURE — 80053 COMPREHEN METABOLIC PANEL: CPT | Performed by: FAMILY MEDICINE

## 2018-07-25 PROCEDURE — 93005 ELECTROCARDIOGRAM TRACING: CPT | Performed by: FAMILY MEDICINE

## 2018-07-25 PROCEDURE — 71045 X-RAY EXAM CHEST 1 VIEW: CPT | Mod: TC

## 2018-07-25 PROCEDURE — 93010 ELECTROCARDIOGRAM REPORT: CPT | Mod: Z6 | Performed by: FAMILY MEDICINE

## 2018-07-25 PROCEDURE — 84443 ASSAY THYROID STIM HORMONE: CPT | Performed by: FAMILY MEDICINE

## 2018-07-25 PROCEDURE — 83690 ASSAY OF LIPASE: CPT | Performed by: FAMILY MEDICINE

## 2018-07-25 PROCEDURE — 25000128 H RX IP 250 OP 636: Performed by: FAMILY MEDICINE

## 2018-07-25 PROCEDURE — 85025 COMPLETE CBC W/AUTO DIFF WBC: CPT | Performed by: FAMILY MEDICINE

## 2018-07-25 RX ORDER — SODIUM CHLORIDE 9 MG/ML
1000 INJECTION, SOLUTION INTRAVENOUS CONTINUOUS
Status: DISCONTINUED | OUTPATIENT
Start: 2018-07-25 | End: 2018-07-25 | Stop reason: HOSPADM

## 2018-07-25 RX ORDER — ALPRAZOLAM 0.5 MG
0.5 TABLET ORAL 3 TIMES DAILY PRN
Qty: 6 TABLET | Refills: 0 | Status: SHIPPED | OUTPATIENT
Start: 2018-07-25 | End: 2019-01-03

## 2018-07-25 RX ADMIN — SODIUM CHLORIDE 1000 ML: 9 INJECTION, SOLUTION INTRAVENOUS at 14:26

## 2018-07-25 NOTE — ED AVS SNAPSHOT
[facility] Emergency Department    [address] [place] [address]    [place] [address]    Phone:  [phone]    Fax:  [phone]                                       Drake Santiago   MRN: [id number]    Department:  [facility] Emergency Department   Date of Visit:  7/25/2018           Patient Information     Date Of Birth          1950        Your diagnoses for this visit were:     Atypical chest pain     Anxiety        You were seen by Henrry Hancock MD.      Follow-up Information     Go to Christiano Braun MD.    Specialty:  Family Practice    Why:  next week as scheduled    Contact information:    [address] [place] [address] [phone]        Discharge References/Attachments     CHEST PAIN, UNCERTAIN CAUSE (ENGLISH)    ANXIETY DISORDERS, UNDERSTANDING (ENGLISH)      Your next 10 appointments already scheduled     Aug 01, 2018  9:20 AM CDT   Office Visit with Christiano Braun MD   [facility]    [address] [place] [address] [phone]           Bring a current list of meds and any records pertaining to this visit. For Physicals, please bring immunization records and any forms needing to be filled out. Please arrive 10 minutes early to complete paperwork.              24 Hour Appointment Hotline       To make an appointment at any [facility], call [phone] ([phone]). If you don't have a family doctor or clinic, we will help you find one. [facility] clinics are conveniently located to serve the needs of you and your family.             Review of your medicines      START taking        Dose / Directions Last dose taken    ALPRAZolam 0.5 MG tablet   Commonly known as:  XANAX   Dose:  0.5 mg   Quantity:  6 tablet        Take 1 tablet (0.5 mg) by mouth 3 times daily as needed for anxiety   Refills:  0          Our records show that you are taking the medicines listed below. If these are  incorrect, please call your family doctor or clinic.        Dose / Directions Last dose taken    atorvastatin 20 MG tablet   Commonly known as:  LIPITOR   Dose:  20 mg   Quantity:  90 tablet        Take 1 tablet (20 mg) by mouth daily   Refills:  3        clindamycin 150 MG capsule   Commonly known as:  CLEOCIN   Dose:  150 mg   Quantity:  30 capsule        Take 1 capsule (150 mg) by mouth 3 times daily   Refills:  0        DIPHENHYDRAMINE HCL PO   Dose:  50 mg        Take 50 mg by mouth nightly as needed   Refills:  0        HYDROcodone-acetaminophen 5-325 MG per tablet   Commonly known as:  NORCO   Dose:  1 tablet   Quantity:  10 tablet        Take 1 tablet by mouth every 6 hours as needed for pain Maximum of 4000 mg of acetaminophen in 24 hours.   Refills:  0        IBUPROFEN PO        Refills:  0        neomycin-polymyxin-dexamethasone 3.5-22944-4.1 Susp ophthalmic susp   Commonly known as:  MAXITROL   Dose:  1 drop   Quantity:  1 Bottle        Place 1 drop into the right eye 4 times daily   Refills:  0        tamsulosin 0.4 MG capsule   Commonly known as:  FLOMAX   Quantity:  180 capsule        TAKE ONE CAPSULE BY MOUTH TWICE A DAY   Refills:  1        * traZODone 100 MG tablet   Commonly known as:  DESYREL   Dose:  100 mg        Take 100 mg by mouth At Bedtime   Refills:  0        * traZODone 100 MG tablet   Commonly known as:  DESYREL   Quantity:  90 tablet        TAKE 1 TABLET BY MOUTH NIGHTLY AS NEEDED FOR SLEEP   Refills:  1        * Notice:  This list has 2 medication(s) that are the same as other medications prescribed for you. Read the directions carefully, and ask your doctor or other care provider to review them with you.            Prescriptions were sent or printed at these locations (1 Prescription)                   [facility] - [place], [place] - [address]   [address], [facility] [address]    Telephone:  [phone]   Fax:  [phone]   Hours:                  Printed at Department/Unit  printer (1 of 1)         ALPRAZolam (XANAX) 0.5 MG tablet                Procedures and tests performed during your visit     CBC with platelets differential    Comprehensive metabolic panel    EKG 12-lead, tracing only    Lipase    Nt probnp inpatient (BNP)    Peripheral IV catheter    TSH with free T4 reflex    Troponin I    XR Chest Port 1 View      Orders Needing Specimen Collection     None      Pending Results     No orders found from 7/23/2018 to 7/26/2018.            Pending Culture Results     No orders found from 7/23/2018 to 7/26/2018.            Pending Results Instructions     If you had any lab results that were not finalized at the time of your Discharge, you can call the ED Lab Result RN at [phone]. You will be contacted by this team for any positive Lab results or changes in treatment. The nurses are available 7 days a week from 10A to 6:30P.  You can leave a message 24 hours per day and they will return your call.        Thank you for choosing [facility]       Thank you for choosing [facility] for your care. Our goal is always to provide you with excellent care. Hearing back from our patients is one way we can continue to improve our services. Please take a few minutes to complete the written survey that you may receive in the mail after you visit with us. Thank you!        [employer] Information     [employer] gives you secure access to your electronic health record. If you see a primary care provider, you can also send messages to your care team and make appointments. If you have questions, please call your primary care clinic.  If you do not have a primary care provider, please call [phone] and they will assist you.        Care EveryWhere ID     This is your Care EveryWhere ID. This could be used by other organizations to access your [facility] medical records  [id number]        Equal Access to Services     MARYANNE TILLMAN AH: Jb Karimi, arnaldo huitron, gustavo amaya,  rangel lopez'aan ah. So [facility] [phone].    ATENCIÓN: Si habla español, tiene a quintana disposición servicios gratuitos de asistencia lingüística. Llame al [phone].    We comply with applicable federal civil rights laws and [place] laws. We do not discriminate on the basis of race, color, national origin, age, disability, sex, sexual orientation, or gender identity.            After Visit Summary       This is your record. Keep this with you and show to your community pharmacist(s) and doctor(s) at your next visit.

## 2018-07-25 NOTE — ED PROVIDER NOTES
History     Chief Complaint   Patient presents with     Chest Pain     HPI  Drake Santiago is a 67 year old male who presents with concerns that his heart was racing and he was having some chest pressure.  He says it feels like a fluttering feeling in his chest and sometimes he feels like a heaviness in his chest.  This is been going on for the last few days.  Patient does admit to being stressed recently and things have not been going well at home.  Patient states he feels a little short of breath also.  Patient denies any nausea or vomiting.  The symptoms do not radiate anywhere.    Problem List:    Patient Active Problem List    Diagnosis Date Noted     ERRONEOUS ENCOUNTER--DISREGARD 09/27/2017     Priority: Medium     Benign prostatic hyperplasia with lower urinary tract symptoms 04/21/2017     Priority: Medium     Hyperlipidemia LDL goal <130 04/21/2017     Priority: Medium        Past Medical History:    Past Medical History:   Diagnosis Date     BPH (benign prostatic hyperplasia)      Hyperlipidemia        Past Surgical History:    Past Surgical History:   Procedure Laterality Date     HERNIORRHAPHY INGUINAL       REPAIR LACERATION LID Right 6/5/2017    Procedure: REPAIR LACERATION LID;  right lower lid laceration repair, right canalicular laceration repair      ;  Surgeon: Al Ac MD;  Location:  OR       Family History:    Family History   Problem Relation Age of Onset     Glaucoma No family hx of      Macular Degeneration No family hx of        Social History:  Marital Status:  Single [1]  Social History   Substance Use Topics     Smoking status: Former Smoker     Smokeless tobacco: Never Used     Alcohol use Yes      Comment: glass of wine nightly         Medications:      atorvastatin (LIPITOR) 20 MG tablet   clindamycin (CLEOCIN) 150 MG capsule   DIPHENHYDRAMINE HCL PO   HYDROcodone-acetaminophen (NORCO) 5-325 MG per tablet   IBUPROFEN PO   neomycin-polymyxin-dexamethasone (MAXITROL)  3.5-62289-2.1 SUSP ophthalmic susp   tamsulosin (FLOMAX) 0.4 MG capsule   traZODone (DESYREL) 100 MG tablet   traZODone (DESYREL) 100 MG tablet         Review of Systems   All other systems reviewed and are negative.      Physical Exam   BP: (!) 156/109  Heart Rate: 90  Resp: 18  SpO2: 97 %      Physical Exam   Constitutional: He is oriented to person, place, and time. He appears well-developed and well-nourished. No distress.   HENT:   Head: Normocephalic and atraumatic.   Nose: Nose normal.   Mouth/Throat: Oropharynx is clear and moist. No oropharyngeal exudate.   Eyes: Conjunctivae are normal. Pupils are equal, round, and reactive to light. No scleral icterus.   Neck: Normal range of motion.   Cardiovascular: Normal rate, regular rhythm, normal heart sounds and intact distal pulses.  Exam reveals no friction rub.    No murmur heard.  Pulmonary/Chest: Effort normal and breath sounds normal. No respiratory distress. He has no wheezes. He has no rales.   Abdominal: Soft. Bowel sounds are normal. He exhibits no distension and no mass. There is no tenderness. There is no rebound and no guarding.   Musculoskeletal: Normal range of motion. He exhibits no edema or tenderness.   Neurological: He is alert and oriented to person, place, and time.   Skin: Skin is warm. No rash noted. He is not diaphoretic.   Psychiatric: Judgment normal.   Nursing note and vitals reviewed.      ED Course     ED Course     Procedures                EKG Interpretation:      Interpreted by Henrry Hancock  Time reviewed: now   Symptoms at time of EKG: now   Rhythm: normal sinus   Rate: normal  Axis: NORMAL  Ectopy: none  Conduction: normal  ST Segments/ T Waves: No ST-T wave changes  Q Waves: none  Comparison to prior: No old EKG available    Clinical Impression: normal EKG    Results for orders placed or performed during the hospital encounter of 07/25/18 (from the past 24 hour(s))   CBC with platelets differential   Result Value Ref  Range    WBC 7.1 4.0 - 11.0 10e9/L    RBC Count 5.12 4.4 - 5.9 10e12/L    Hemoglobin 15.4 13.3 - 17.7 g/dL    Hematocrit 45.9 40.0 - 53.0 %    MCV 90 78 - 100 fl    MCH 30.1 26.5 - 33.0 pg    MCHC 33.6 31.5 - 36.5 g/dL    RDW 13.4 10.0 - 15.0 %    Platelet Count 263 150 - 450 10e9/L    Diff Method Automated Method     % Neutrophils 73.4 %    % Lymphocytes 18.4 %    % Monocytes 7.1 %    % Eosinophils 0.3 %    % Basophils 0.4 %    % Immature Granulocytes 0.4 %    Nucleated RBCs 0 0 /100    Absolute Neutrophil 5.2 1.6 - 8.3 10e9/L    Absolute Lymphocytes 1.3 0.8 - 5.3 10e9/L    Absolute Monocytes 0.5 0.0 - 1.3 10e9/L    Absolute Basophils 0.0 0.0 - 0.2 10e9/L    Abs Immature Granulocytes 0.0 0 - 0.4 10e9/L    Absolute Nucleated RBC 0.0    Comprehensive metabolic panel   Result Value Ref Range    Sodium 143 133 - 144 mmol/L    Potassium 3.3 (L) 3.4 - 5.3 mmol/L    Chloride 106 94 - 109 mmol/L    Carbon Dioxide 27 20 - 32 mmol/L    Anion Gap 10 3 - 14 mmol/L    Glucose 118 (H) 70 - 99 mg/dL    Urea Nitrogen 24 7 - 30 mg/dL    Creatinine 1.20 0.66 - 1.25 mg/dL    GFR Estimate 61 >60 mL/min/1.7m2    GFR Estimate If Black 73 >60 mL/min/1.7m2    Calcium 8.9 8.5 - 10.1 mg/dL    Bilirubin Total 1.1 0.2 - 1.3 mg/dL    Albumin 4.0 3.4 - 5.0 g/dL    Protein Total 7.8 6.8 - 8.8 g/dL    Alkaline Phosphatase 79 40 - 150 U/L    ALT 25 0 - 70 U/L    AST 21 0 - 45 U/L   Lipase   Result Value Ref Range    Lipase 185 73 - 393 U/L   Troponin I   Result Value Ref Range    Troponin I ES <0.015 0.000 - 0.045 ug/L   TSH with free T4 reflex   Result Value Ref Range    TSH 0.64 0.40 - 4.00 mU/L   Nt probnp inpatient (BNP)   Result Value Ref Range    N-Terminal Pro BNP Inpatient 62 0 - 900 pg/mL   XR Chest Port 1 View    Narrative    CHEST ONE VIEW PORTABLE  7/25/2018 2:16 PM     HISTORY:  Chest pain.     COMPARISON: None.      Impression    IMPRESSION: Heart size is normal. Pulmonary vasculature is normal.  Lungs are clear. No pleural fluid. No  acute disease.    KRISTEN HDZ MD       Medications   0.9% sodium chloride BOLUS (1,000 mLs Intravenous New Bag 7/25/18 9454)     Followed by   sodium chloride 0.9% infusion (not administered)   Labs reviewed and were unremarkable.  Patient was monitored on the cardiac monitor for almost 2 hours and there is no signs of any arrhythmias or other issues.  I think the patient's symptoms are more related to stress and anxiety.  With him having symptoms for this long and having a negative troponin is reassuring that this is not cardiac.  I am going to give the patient a small supply of Xanax to try if he starts feeling like his heart is racing or having these other symptoms again.  Currently he feels completely asymptomatic.  Patient will follow up with Dr. Braun next week for further evaluation.      Assessments & Plan (with Medical Decision Making)  Atypical chest pain, anxiety     I have reviewed the nursing notes.    I have reviewed the findings, diagnosis, plan and need for follow up with the patient.          7/25/2018   [facility] EMERGENCY DEPARTMENT     Henrry Hancock MD  07/25/18 9456

## 2018-07-25 NOTE — ED TRIAGE NOTES
"Pt states it felt like his heart was racing.  Pt has HA.  \"Feels jittery inside\" pointing to his chest.  Pt lost 25 lb in last two months.  Pt denies CP or pressure but had CP on Saturday.    "

## 2018-07-25 NOTE — TELEPHONE ENCOUNTER
"Drake Santiago is a 67 year old male who calls with concerns: Pt has had recent chest pains, shaking \"like  A leaf on the inside\", weight loss of 25lb in 2 months, irritable, insomnia, and states that Saturday he felt like \"a 400lb gorilla was sitting on his chest.\"    NURSING ASSESSMENT:  Description:  Shakiness, denies SOB, no diabetes  Onset/duration:  5 days  Precip. factors:  25lb weight loss, chest pressure Saturday, loss of appetite, diarrhea, trembling  Associated symptoms:  Irritable, tired, shaky  Improves/worsens symptoms:  unknown  Last exam/Treatment:  12/15/17  Allergies:   Allergies   Allergen Reactions     Bees Anaphylaxis     Penicillins Anaphylaxis       RECOMMENDED DISPOSITION:  To ED, another person to drive -   Will comply with recommendation: Yes and waiting for wife to get off work in 4 hours.   If further questions/concerns or if symptoms do not improve, worsen or new symptoms develop, call your PCP or [facility] as soon as possible.      Guideline used: Chest Pain; pg 118  Telephone Triage Protocols for Nurses, Fifth Edition, Kristal Branham RN  "

## 2018-07-25 NOTE — ED AVS SNAPSHOT
[facility] Emergency Department    [address] [place] [address]    Phone:  [phone]    Fax:  [phone]                                       Drake Santiago   MRN: [id number]    Department:  [facility] Emergency Department   Date of Visit:  7/25/2018           After Visit Summary Signature Page     I have received my discharge instructions, and my questions have been answered. I have discussed any challenges I see with this plan with the nurse or doctor.    ..........................................................................................................................................  Patient/Patient Representative Signature      ..........................................................................................................................................  Patient Representative Print Name and Relationship to Patient    ..................................................               ................................................  Date                                            Time    ..........................................................................................................................................  Reviewed by Signature/Title    ...................................................              ..............................................  Date                                                            Time

## 2018-08-01 ENCOUNTER — TELEPHONE (OUTPATIENT)
Dept: FAMILY MEDICINE | Facility: CLINIC | Age: 68
End: 2018-08-01

## 2018-08-01 ENCOUNTER — OFFICE VISIT (OUTPATIENT)
Dept: FAMILY MEDICINE | Facility: CLINIC | Age: 68
End: 2018-08-01
Payer: MEDICARE

## 2018-08-01 VITALS
RESPIRATION RATE: 14 BRPM | HEART RATE: 82 BPM | WEIGHT: 172.5 LBS | TEMPERATURE: 97.5 F | BODY MASS INDEX: 24.75 KG/M2 | OXYGEN SATURATION: 100 % | DIASTOLIC BLOOD PRESSURE: 60 MMHG | SYSTOLIC BLOOD PRESSURE: 122 MMHG

## 2018-08-01 DIAGNOSIS — G47.00 INSOMNIA, UNSPECIFIED TYPE: ICD-10-CM

## 2018-08-01 DIAGNOSIS — F33.0 MAJOR DEPRESSIVE DISORDER, RECURRENT EPISODE, MILD (H): Primary | ICD-10-CM

## 2018-08-01 PROCEDURE — 99214 OFFICE O/P EST MOD 30 MIN: CPT | Performed by: FAMILY MEDICINE

## 2018-08-01 RX ORDER — ESZOPICLONE 2 MG/1
2 TABLET, FILM COATED ORAL AT BEDTIME
Qty: 20 TABLET | Refills: 0 | Status: SHIPPED | OUTPATIENT
Start: 2018-08-01 | End: 2019-01-03

## 2018-08-01 RX ORDER — CITALOPRAM HYDROBROMIDE 20 MG/1
20 TABLET ORAL DAILY
Qty: 30 TABLET | Refills: 1 | Status: SHIPPED | OUTPATIENT
Start: 2018-08-01 | End: 2018-08-22

## 2018-08-01 ASSESSMENT — ANXIETY QUESTIONNAIRES
1. FEELING NERVOUS, ANXIOUS, OR ON EDGE: NEARLY EVERY DAY
6. BECOMING EASILY ANNOYED OR IRRITABLE: NEARLY EVERY DAY
5. BEING SO RESTLESS THAT IT IS HARD TO SIT STILL: MORE THAN HALF THE DAYS
GAD7 TOTAL SCORE: 16
3. WORRYING TOO MUCH ABOUT DIFFERENT THINGS: NEARLY EVERY DAY
IF YOU CHECKED OFF ANY PROBLEMS ON THIS QUESTIONNAIRE, HOW DIFFICULT HAVE THESE PROBLEMS MADE IT FOR YOU TO DO YOUR WORK, TAKE CARE OF THINGS AT HOME, OR GET ALONG WITH OTHER PEOPLE: EXTREMELY DIFFICULT
2. NOT BEING ABLE TO STOP OR CONTROL WORRYING: MORE THAN HALF THE DAYS
7. FEELING AFRAID AS IF SOMETHING AWFUL MIGHT HAPPEN: NOT AT ALL

## 2018-08-01 ASSESSMENT — PATIENT HEALTH QUESTIONNAIRE - PHQ9: 5. POOR APPETITE OR OVEREATING: NEARLY EVERY DAY

## 2018-08-01 NOTE — TELEPHONE ENCOUNTER
Prior Authorization Retail Medication Request    Medication/Dose: eszopiclone 2mg  ICD code (if different than what is on RX):    Previously Tried and Failed:  trazodone  Rationale:      Insurance Name:  [facility]  Insurance ID:  [id number]      Pharmacy Information (if different than what is on RX)  Name:  [facility]  Phone:  [phone]

## 2018-08-01 NOTE — TELEPHONE ENCOUNTER
PA Initiation    Medication: eszopiclone  Insurance Company: [employer] - Phone [phone] Fax [phone]  Pharmacy Filling the Rx: [facility] - [place] - [address]  Filling Pharmacy Phone: [phone]  Filling Pharmacy Fax:    Start Date: 8/1/2018    Central Prior Authorization Team   Phone: [phone]

## 2018-08-01 NOTE — PATIENT INSTRUCTIONS
Discontinue trazodone.    Start Celexa 20 mg a day probably take it in the morning.    Use Lunesta sparingly to help to sleep if you need it.

## 2018-08-01 NOTE — PROGRESS NOTES
SUBJECTIVE:   Drake Santiago is a 67 year old male who presents to clinic today for the following health issues:      Concern - Fatigue  Onset: x couple months    Description:     Unable to sleep, doesn't care about anything, weight loss, no appetite, raymond, snappy     Intensity: 9 /10    Progression of Symptoms:  worsening    Accompanying Signs & Symptoms:  Not that patient is aware of    Previous history of similar problem:   no    Precipitating factors:   Worsened by: everything    Alleviating factors:  Improved by: nothing    Therapies Tried and outcome: nothing has been tried other than trazodone which patient would like to stop        Problem list and histories reviewed & adjusted, as indicated.  Additional history: as documented        Reviewed and updated as needed this visit by clinical staff  Allergies       Reviewed and updated as needed this visit by Provider        SUBJECTIVE:  Drake  is a 67 year old male who presents for: Follow-up of his emergency room visit.  He has been having more fatigue no appetite weight loss raymond breaks out crying.  Snappy.  He has had insomnia and he has been taking up to 100 mg of trazodone but wants to stop this.  He took some Lunesta that somebody had and it worked well.  In the emergency room and cardiac was ruled out.  Relationship problems at home.    Past Medical History:   Diagnosis Date     BPH (benign prostatic hyperplasia)      Hyperlipidemia      Past Surgical History:   Procedure Laterality Date     HERNIORRHAPHY INGUINAL       REPAIR LACERATION LID Right 6/5/2017    Procedure: REPAIR LACERATION LID;  right lower lid laceration repair, right canalicular laceration repair      ;  Surgeon: Al Ac MD;  Location:  OR     Social History   Substance Use Topics     Smoking status: Former Smoker     Smokeless tobacco: Never Used     Alcohol use Yes      Comment: glass of wine nightly      Current Outpatient Prescriptions   Medication Sig Dispense  Refill     ALPRAZolam (XANAX) 0.5 MG tablet Take 1 tablet (0.5 mg) by mouth 3 times daily as needed for anxiety 6 tablet 0     atorvastatin (LIPITOR) 20 MG tablet Take 1 tablet (20 mg) by mouth daily 90 tablet 3     citalopram (CELEXA) 20 MG tablet Take 1 tablet (20 mg) by mouth daily 30 tablet 1     DIPHENHYDRAMINE HCL PO Take 50 mg by mouth nightly as needed       eszopiclone (LUNESTA) 2 MG tablet Take 1 tablet (2 mg) by mouth At Bedtime 20 tablet 0     tamsulosin (FLOMAX) 0.4 MG capsule TAKE ONE CAPSULE BY MOUTH TWICE A  capsule 1     HYDROcodone-acetaminophen (NORCO) 5-325 MG per tablet Take 1 tablet by mouth every 6 hours as needed for pain Maximum of 4000 mg of acetaminophen in 24 hours. (Patient not taking: Reported on 12/15/2017) 10 tablet 0     IBUPROFEN PO          REVIEW OF SYSTEMS:   5 point ROS negative except as noted above in HPI, including Gen., Resp, CV, GI &  system review.     OBJECTIVE:  Vitals: /60  Pulse 82  Temp 97.5  F (36.4  C) (Temporal)  Resp 14  Wt 172 lb 8 oz (78.2 kg)  SpO2 100%  BMI 24.75 kg/m2  BMI= Body mass index is 24.75 kg/(m^2).  He is alert.  Distraught.  Tearful.  PHQ 9 score is 21.  Neck is supple.  Lungs are clear.  Heart regular rhythm.  Skin without rashes.  Extremities normal.    ASSESSMENT:  #1 major depression #2 insomnia    PLAN:  We will start him on Celexa once a day.  Also try some Lunesta 2 mg at at bedtime as needed.  Gave him a card for psychology consideration I believe he needs counseling very badly.  Will follow up closely.        Christiano Braun MD  [facility]

## 2018-08-01 NOTE — MR AVS SNAPSHOT
After Visit Summary   8/1/2018    Drake Santiago    MRN: [id number]           Patient Information     Date Of Birth          1950        Visit Information        Provider Department      8/1/2018 9:20 AM Christiano Braun MD [facility]        Today's Diagnoses     Major depressive disorder, recurrent episode, mild (H)    -  1    Insomnia, unspecified type          Care Instructions    Discontinue trazodone.    Start Celexa 20 mg a day probably take it in the morning.    Use Lunesta sparingly to help to sleep if you need it.          Follow-ups after your visit        Who to contact     If you have questions or need follow up information about today's clinic visit or your schedule please contact [facility] directly at [phone].  Normal or non-critical lab and imaging results will be communicated to you by Nev[employer], letter or phone within 4 business days after the clinic has received the results. If you do not hear from us within 7 days, please contact the clinic through [employer] or phone. If you have a critical or abnormal lab result, we will notify you by phone as soon as possible.  Submit refill requests through [employer] or call your pharmacy and they will forward the refill request to us. Please allow 3 business days for your refill to be completed.          Additional Information About Your Visit        MyChart Information     [employer] gives you secure access to your electronic health record. If you see a primary care provider, you can also send messages to your care team and make appointments. If you have questions, please call your primary care clinic.  If you do not have a primary care provider, please call [phone] and they will assist you.        Care EveryWhere ID     This is your Care EveryWhere ID. This could be used by other organizations to access your [place] medical records  [id number]        Your Vitals Were     Pulse Temperature  Respirations Pulse Oximetry BMI (Body Mass Index)       82 97.5  F (36.4  C) (Temporal) 14 100% 24.75 kg/m2        Blood Pressure from Last 3 Encounters:   08/01/18 122/60   07/25/18 138/89   12/15/17 120/70    Weight from Last 3 Encounters:   08/01/18 172 lb 8 oz (78.2 kg)   12/15/17 197 lb 14.4 oz (89.8 kg)   06/05/17 195 lb (88.5 kg)              Today, you had the following     No orders found for display         Today's Medication Changes          These changes are accurate as of 8/1/18 10:05 AM.  If you have any questions, ask your nurse or doctor.               Start taking these medicines.        Dose/Directions    citalopram 20 MG tablet   Commonly known as:  celeXA   Used for:  Major depressive disorder, recurrent episode, mild (H)   Started by:  Christiano Braun MD        Dose:  20 mg   Take 1 tablet (20 mg) by mouth daily   Quantity:  30 tablet   Refills:  1       eszopiclone 2 MG tablet   Commonly known as:  LUNESTA   Used for:  Insomnia, unspecified type   Started by:  Christiano Braun MD        Dose:  2 mg   Take 1 tablet (2 mg) by mouth At Bedtime   Quantity:  20 tablet   Refills:  0            Where to get your medicines      These medications were sent to [address]  [address] [facility] [address]     Phone:  [phone]     citalopram 20 MG tablet         Some of these will need a paper prescription and others can be bought over the counter.  Ask your nurse if you have questions.     Bring a paper prescription for each of these medications     eszopiclone 2 MG tablet                Primary Care Provider Office Phone # Fax #    Christiano Braun [phone] [address] [address]        Equal Access to Services     MARYANNE TILLMAN : Jb mosqueda Soeduard, waaxda luqadaha, qaybta kaalmada adeegyada, rangel root. So [facility] [phone].    ATENCIÓN: Si habla español, tiene a quintana  disposición servicios gratuitos de asistencia lingüística. Eugene anton [phone].    We comply with applicable federal civil rights laws and [place] laws. We do not discriminate on the basis of race, color, national origin, age, disability, sex, sexual orientation, or gender identity.            Thank you!     Thank you for choosing [facility]  for your care. Our goal is always to provide you with excellent care. Hearing back from our patients is one way we can continue to improve our services. Please take a few minutes to complete the written survey that you may receive in the mail after your visit with us. Thank you!             Your Updated Medication List - Protect others around you: Learn how to safely use, store and throw away your medicines at www.disposemymeds.org.          This list is accurate as of 8/1/18 10:05 AM.  Always use your most recent med list.                   Brand Name Dispense Instructions for use Diagnosis    ALPRAZolam 0.5 MG tablet    XANAX    6 tablet    Take 1 tablet (0.5 mg) by mouth 3 times daily as needed for anxiety        atorvastatin 20 MG tablet    LIPITOR    90 tablet    Take 1 tablet (20 mg) by mouth daily    Hyperlipidemia LDL goal <130       citalopram 20 MG tablet    celeXA    30 tablet    Take 1 tablet (20 mg) by mouth daily    Major depressive disorder, recurrent episode, mild (H)       DIPHENHYDRAMINE HCL PO      Take 50 mg by mouth nightly as needed        eszopiclone 2 MG tablet    LUNESTA    20 tablet    Take 1 tablet (2 mg) by mouth At Bedtime    Insomnia, unspecified type       HYDROcodone-acetaminophen 5-325 MG per tablet    NORCO    10 tablet    Take 1 tablet by mouth every 6 hours as needed for pain Maximum of 4000 mg of acetaminophen in 24 hours.    Postoperative state       IBUPROFEN PO           tamsulosin 0.4 MG capsule    FLOMAX    180 capsule    TAKE ONE CAPSULE BY MOUTH TWICE A DAY    Benign prostatic hyperplasia with lower urinary tract  symptoms, symptom details unspecified

## 2018-08-02 ASSESSMENT — ANXIETY QUESTIONNAIRES: GAD7 TOTAL SCORE: 16

## 2018-08-02 ASSESSMENT — PATIENT HEALTH QUESTIONNAIRE - PHQ9: SUM OF ALL RESPONSES TO PHQ QUESTIONS 1-9: 21

## 2018-08-02 NOTE — TELEPHONE ENCOUNTER
Prior Authorization Approval    Authorization Effective Date: 5/3/2018  Authorization Expiration Date: 8/1/2019  Medication: eszopiclone  Approved Dose/Quantity: 20/20 days  Reference #: n/a   Insurance Company: [employer] - Phone [phone] Fax [phone]  Which Pharmacy is filling the prescription (Not needed for infusion/clinic administered): [facility] - [place], [place] - [address]  Pharmacy Notified: Yes  Patient Notified: Yes

## 2018-08-21 ENCOUNTER — OFFICE VISIT (OUTPATIENT)
Dept: AUDIOLOGY | Facility: CLINIC | Age: 68
End: 2018-08-21
Payer: MEDICARE

## 2018-08-21 DIAGNOSIS — H90.3 SENSORINEURAL HEARING LOSS, BILATERAL: Primary | ICD-10-CM

## 2018-08-21 PROCEDURE — V5299 HEARING SERVICE: HCPCS | Performed by: AUDIOLOGIST

## 2018-08-21 PROCEDURE — 99207 ZZC NO CHARGE LOS: CPT | Performed by: AUDIOLOGIST

## 2018-08-21 NOTE — PROGRESS NOTES
Hearing Aid Check     SUBJECTIVE:  Drake Santiago is a 67 year old year old male, seen today for a hearing maintenance and programming at [facility]. Patient reports current [facility] [address] hearing aids [id number] (right) and [id number] (left) were intermittent and he has not used [id number] (right) and [id number] (left) in a long time but would like them as a functioning back up.         OBJECTIVE:  Otoscopic exam indicates partial obstruction with cerumen bilaterally, paritially removed with a lighted curette right and removed with a lighted curette left.     Replacement receivers were connected to [id number] (right) and [id number] (left) and programming was adjusted to match NAL-NL1 speech targets, decreased high frequencies in the left device after feedback of cable and changed to large closed dome left (right medium closed dome).    Transferred programming to [id number] (right) and [id number] (left) and used the receivers from the other devices.  Real ear measures showed a good match to speech targets right and under amplification even after programming adjustments left.     PLAN: Recommended  repair of the left device, patient approved charges. Call for  and programming.    Andi Geronimo.  [place] Licensed Audiologist #[id number]

## 2018-08-21 NOTE — MR AVS SNAPSHOT
After Visit Summary   8/21/2018    Drake Santiago    MRN: [id number]           Patient Information     Date Of Birth          1950        Visit Information        Provider Department      8/21/2018 10:30 AM Beatriz Fisher AuD [facility]        Today's Diagnoses     Sensorineural hearing loss, bilateral    -  1       Follow-ups after your visit        Your next 10 appointments already scheduled     Aug 22, 2018  9:20 AM CDT   [facility] h[facility]ristiano Braun [facility]    [address] [place] [id number] [phone]           Bring a current list of meds and any records pertaining to this visit. For Physicals, please bring immunization records and any forms needing to be filled out. Please arrive 10 minutes early to complete paperwork.              Who to contact     If you have questions or need follow up information about today's clinic visit or your schedule please contact [facility] directly at [phone].  Normal or non-critical lab and imaging results will be communicated to you by goBaltohart, letter or phone within 4 business days after the clinic has received the results. If you do not hear from us within 7 days, please contact the clinic through [employer] or phone. If you have a critical or abnormal lab result, we will notify you by phone as soon as possible.  Submit refill requests through [employer] or call your pharmacy and they will forward the refill request to us. Please allow 3 business days for your refill to be completed.          Additional Information About Your Visit        goBaltohart Information     [employer] gives you secure access to your electronic health record. If you see a primary care provider, you can also send messages to your care team and make appointments. If you have questions, please call your primary care clinic.  If you do not have a primary care provider,  please call [phone] and they will assist you.        Care EveryWhere ID     This is your Care EveryWhere ID. This could be used by other organizations to access your [facility] medical records  [id number]         Blood Pressure from Last 3 Encounters:   08/01/18 122/60   07/25/18 138/89   12/15/17 120/70    Weight from Last 3 Encounters:   08/01/18 172 lb 8 oz (78.2 kg)   12/15/17 197 lb 14.4 oz (89.8 kg)   06/05/17 195 lb (88.5 kg)              We Performed the Following     HEARING AID CHECK/NO CHARGE        Primary Care Provider Office Phone # Fax #    Christiano Braun [phone]       [address] [facility] [address]        Equal Access to Services     [facility]: Hadii aad gregg hadasho Soomaali, waaxda luqadaha, qaybta kaalmada adeegyada, waxjavon singh hayzak hays . So [facility] [phone].    ATENCIÓN: Si habla español, tiene a quintana disposición servicios gratuitos de asistencia lingüística. Eugene al [phone].    We comply with applicable federal civil rights laws and [place] laws. We do not discriminate on the basis of race, color, national origin, age, disability, sex, sexual orientation, or gender identity.            Thank you!     Thank you for choosing [facility]  for your care. Our goal is always to provide you with excellent care. Hearing back from our patients is one way we can continue to improve our services. Please take a few minutes to complete the written survey that you may receive in the mail after your visit with us. Thank you!             Your Updated Medication List - Protect others around you: Learn how to safely use, store and throw away your medicines at www.disposemymeds.org.          This list is accurate as of 8/21/18  4:12 PM.  Always use your most recent med list.                   Brand Name Dispense Instructions for use Diagnosis    ALPRAZolam 0.5 MG tablet    XANAX    6 tablet    Take 1 tablet (0.5 mg) by mouth 3  times daily as needed for anxiety        atorvastatin 20 MG tablet    LIPITOR    90 tablet    Take 1 tablet (20 mg) by mouth daily    Hyperlipidemia LDL goal <130       citalopram 20 MG tablet    celeXA    30 tablet    Take 1 tablet (20 mg) by mouth daily    Major depressive disorder, recurrent episode, mild (H)       DIPHENHYDRAMINE HCL PO      Take 50 mg by mouth nightly as needed        eszopiclone 2 MG tablet    LUNESTA    20 tablet    Take 1 tablet (2 mg) by mouth At Bedtime    Insomnia, unspecified type       HYDROcodone-acetaminophen 5-325 MG per tablet    NORCO    10 tablet    Take 1 tablet by mouth every 6 hours as needed for pain Maximum of 4000 mg of acetaminophen in 24 hours.    Postoperative state       IBUPROFEN PO           tamsulosin 0.4 MG capsule    FLOMAX    180 capsule    TAKE ONE CAPSULE BY MOUTH TWICE A DAY    Benign prostatic hyperplasia with lower urinary tract symptoms, symptom details unspecified

## 2018-08-22 ENCOUNTER — TELEPHONE (OUTPATIENT)
Dept: FAMILY MEDICINE | Facility: CLINIC | Age: 68
End: 2018-08-22

## 2018-08-22 ENCOUNTER — OFFICE VISIT (OUTPATIENT)
Dept: FAMILY MEDICINE | Facility: CLINIC | Age: 68
End: 2018-08-22
Payer: MEDICARE

## 2018-08-22 VITALS
RESPIRATION RATE: 14 BRPM | TEMPERATURE: 97.7 F | WEIGHT: 175 LBS | OXYGEN SATURATION: 98 % | SYSTOLIC BLOOD PRESSURE: 116 MMHG | HEART RATE: 78 BPM | HEIGHT: 70 IN | BODY MASS INDEX: 25.05 KG/M2 | DIASTOLIC BLOOD PRESSURE: 60 MMHG

## 2018-08-22 DIAGNOSIS — F33.0 MAJOR DEPRESSIVE DISORDER, RECURRENT EPISODE, MILD (H): ICD-10-CM

## 2018-08-22 PROCEDURE — 99213 OFFICE O/P EST LOW 20 MIN: CPT | Performed by: FAMILY MEDICINE

## 2018-08-22 RX ORDER — CITALOPRAM HYDROBROMIDE 40 MG/1
40 TABLET ORAL DAILY
Qty: 30 TABLET | Refills: 1 | Status: SHIPPED | OUTPATIENT
Start: 2018-08-22 | End: 2018-10-24

## 2018-08-22 NOTE — TELEPHONE ENCOUNTER
Reason for Call:  Other prescription    Detailed comments: Needs to discuss the dosage of his Celexa.  Pharmacy does not have 30mg.   Needs to be written as a 20mg and 10mg tablet.    Phone Number Patient can be reached at: Home number on file [phone] (home)    Best Time: any    Can we leave a detailed message on this number? YES    Call taken on 8/22/2018 at 10:52 AM by Hali Dawn

## 2018-08-22 NOTE — MR AVS SNAPSHOT
"              After Visit Summary   8/22/2018    Drake Santiago    MRN: [id number]           Patient Information     Date Of Birth          1950        Visit Information        Provider Department      8/22/2018 9:20 AM Christiano Braun MD [facility]        Today's Diagnoses     Major depressive disorder, recurrent episode, mild (H)           Follow-ups after your visit        Who to contact     If you have questions or need follow up information about today's clinic visit or your schedule please contact [facility] directly at [phone].  Normal or non-critical lab and imaging results will be communicated to you by TopFloorhart, letter or phone within 4 business days after the clinic has received the results. If you do not hear from us within 7 days, please contact the clinic through [employer] System[employer] or phone. If you have a critical or abnormal lab result, we will notify you by phone as soon as possible.  Submit refill requests through Tripping or call your pharmacy and they will forward the refill request to us. Please allow 3 business days for your refill to be completed.          Additional Information About Your Visit        MyChart Information     Tripping gives you secure access to your electronic health record. If you see a primary care provider, you can also send messages to your care team and make appointments. If you have questions, please call your primary care clinic.  If you do not have a primary care provider, please call [phone] and they will assist you.        Care EveryWhere ID     This is your Care EveryWhere ID. This could be used by other organizations to access your [facility] medical records  [id number]        Your Vitals Were     Pulse Temperature Respirations Height Pulse Oximetry BMI (Body Mass Index)    78 97.7  F (36.5  C) (Temporal) 14 5' 10.47\" (1.79 m) 98% 24.77 kg/m2       Blood Pressure from Last 3 Encounters:   08/22/18 116/60   08/01/18 122/60 "   07/25/18 138/89    Weight from Last 3 Encounters:   08/22/18 175 lb (79.4 kg)   08/01/18 172 lb 8 oz (78.2 kg)   12/15/17 197 lb 14.4 oz (89.8 kg)              Today, you had the following     No orders found for display         Today's Medication Changes          These changes are accurate as of 8/22/18  9:57 AM.  If you have any questions, ask your nurse or doctor.               These medicines have changed or have updated prescriptions.        Dose/Directions    citalopram 40 MG tablet   Commonly known as:  celeXA   This may have changed:    - medication strength  - how much to take   Used for:  Major depressive disorder, recurrent episode, mild (H)   Changed by:  Christiano Braun MD        Dose:  40 mg   Take 1 tablet (40 mg) by mouth daily   Quantity:  30 tablet   Refills:  1            Where to get your medicines      Some of these will need a paper prescription and others can be bought over the counter.  Ask your nurse if you have questions.     Bring a paper prescription for each of these medications     citalopram 40 MG tablet                Primary Care Provider Office Phone # Fax #    Christiano Braun [phone]       [address] [facility] [address]        Equal Access to Services     MARYANNE TILLMAN AH: Hadii elizabeth escobedo hadasho Soomaali, waaxda luqadaha, qaybta kaalmada adeegyada, rangel root. So [facility] [phone].    ATENCIÓN: Si habla español, tiene a quintana disposición servicios gratuitos de asistencia lingüística. Llame al [phone].    We comply with applicable federal civil rights laws and [place] laws. We do not discriminate on the basis of race, color, national origin, age, disability, sex, sexual orientation, or gender identity.            Thank you!     Thank you for choosing [facility]  for your care. Our goal is always to provide you with excellent care. Hearing back from our patients is one way we can continue to  improve our services. Please take a few minutes to complete the written survey that you may receive in the mail after your visit with us. Thank you!             Your Updated Medication List - Protect others around you: Learn how to safely use, store and throw away your medicines at www.disposemymeds.org.          This list is accurate as of 8/22/18  9:57 AM.  Always use your most recent med list.                   Brand Name Dispense Instructions for use Diagnosis    ALPRAZolam 0.5 MG tablet    XANAX    6 tablet    Take 1 tablet (0.5 mg) by mouth 3 times daily as needed for anxiety        atorvastatin 20 MG tablet    LIPITOR    90 tablet    Take 1 tablet (20 mg) by mouth daily    Hyperlipidemia LDL goal <130       citalopram 40 MG tablet    celeXA    30 tablet    Take 1 tablet (40 mg) by mouth daily    Major depressive disorder, recurrent episode, mild (H)       DIPHENHYDRAMINE HCL PO      Take 50 mg by mouth nightly as needed        eszopiclone 2 MG tablet    LUNESTA    20 tablet    Take 1 tablet (2 mg) by mouth At Bedtime    Insomnia, unspecified type       HYDROcodone-acetaminophen 5-325 MG per tablet    NORCO    10 tablet    Take 1 tablet by mouth every 6 hours as needed for pain Maximum of 4000 mg of acetaminophen in 24 hours.    Postoperative state       IBUPROFEN PO           tamsulosin 0.4 MG capsule    FLOMAX    180 capsule    TAKE ONE CAPSULE BY MOUTH TWICE A DAY    Benign prostatic hyperplasia with lower urinary tract symptoms, symptom details unspecified

## 2018-08-22 NOTE — PROGRESS NOTES
SUBJECTIVE:   Drake Santiago is a 67 year old male who presents to clinic today for the following health issues:      Depression Followup    Status since last visit: same as before    See PHQ-9 for current symptoms.  Other associated symptoms: None    Complicating factors:   Significant life event:  No   Current substance abuse:  None  Anxiety or Panic symptoms:  No    PHQ-9 8/1/2018   Total Score 21   Q9: Suicide Ideation Several days   F/U: Thoughts of suicide or self-harm No   F/U: Safety concerns No       PHQ-9  English  PHQ-9   Any Language  Suicide Assessment Five-step Evaluation and Treatment (SAFE-T)    Amount of exercise or physical activity: 4-5 days/week for an average of greater than 60 minutes    Problems taking medications regularly: No    Medication side effects: none    Diet: regular (no restrictions)            Problem list and histories reviewed & adjusted, as indicated.  Additional history: as documented        Reviewed and updated as needed this visit by clinical staff       Reviewed and updated as needed this visit by Provider        SUBJECTIVE:  Drake  is a 67 year old male who presents for: Follow-up of his depression.  He does not think he has enough's Celexa.  Thinks he could do with a higher dose.  He is definitely feeling better.  Still trouble sleeping.  He took the Lunesta a couple of times that it may be helped but he has been waking up at 2 or 3 in the morning with his mind reeling and not able to get back to sleep.  He has taken some Benadryl.    Past Medical History:   Diagnosis Date     BPH (benign prostatic hyperplasia)      Hyperlipidemia      Past Surgical History:   Procedure Laterality Date     HERNIORRHAPHY INGUINAL       REPAIR LACERATION LID Right 6/5/2017    Procedure: REPAIR LACERATION LID;  right lower lid laceration repair, right canalicular laceration repair      ;  Surgeon: Al Ac MD;  Location:  OR     Social History   Substance Use Topics     Smoking  "status: Former Smoker     Smokeless tobacco: Never Used     Alcohol use Yes      Comment: glass of wine nightly      Current Outpatient Prescriptions   Medication Sig Dispense Refill     ALPRAZolam (XANAX) 0.5 MG tablet Take 1 tablet (0.5 mg) by mouth 3 times daily as needed for anxiety 6 tablet 0     atorvastatin (LIPITOR) 20 MG tablet Take 1 tablet (20 mg) by mouth daily 90 tablet 3     citalopram (CELEXA) 40 MG tablet Take 1 tablet (40 mg) by mouth daily 30 tablet 1     DIPHENHYDRAMINE HCL PO Take 50 mg by mouth nightly as needed       eszopiclone (LUNESTA) 2 MG tablet Take 1 tablet (2 mg) by mouth At Bedtime 20 tablet 0     HYDROcodone-acetaminophen (NORCO) 5-325 MG per tablet Take 1 tablet by mouth every 6 hours as needed for pain Maximum of 4000 mg of acetaminophen in 24 hours. 10 tablet 0     IBUPROFEN PO        tamsulosin (FLOMAX) 0.4 MG capsule TAKE ONE CAPSULE BY MOUTH TWICE A  capsule 1     [DISCONTINUED] citalopram (CELEXA) 20 MG tablet Take 1 tablet (20 mg) by mouth daily 30 tablet 1       REVIEW OF SYSTEMS:   5 point ROS negative except as noted above in HPI, including Gen., Resp, CV, GI &  system review.     OBJECTIVE:  Vitals: /60  Pulse 78  Temp 97.7  F (36.5  C) (Temporal)  Resp 14  Ht 5' 10.47\" (1.79 m)  Wt 175 lb (79.4 kg)  SpO2 98%  BMI 24.77 kg/m2  BMI= Body mass index is 24.77 kg/(m^2).  He is alert.  Appears in no distress.  His affect is much brighter than the last time he was in.  More talkative.  Feels more positive.    ASSESSMENT:  Depression #2 insomnia    PLAN:  We are going to increase his Celexa to 40 mg a day.  We will see him back in about 3 weeks to see how this is going.  Offered counseling for him and he will think about it and will discuss it next time.  The insomnia is getting continue with the Benadryl if he wants to we might have to go to 3 mg Lunesta as he has tried that in the past and this worked.  But again as we spoke I did not think would be a good " idea for him to get on something every single night.        Christiano Braun MD  [facility]

## 2018-08-24 ASSESSMENT — PATIENT HEALTH QUESTIONNAIRE - PHQ9: SUM OF ALL RESPONSES TO PHQ QUESTIONS 1-9: 20

## 2018-10-24 DIAGNOSIS — F33.0 MAJOR DEPRESSIVE DISORDER, RECURRENT EPISODE, MILD (H): ICD-10-CM

## 2018-10-26 RX ORDER — CITALOPRAM HYDROBROMIDE 40 MG/1
TABLET ORAL
Qty: 30 TABLET | Refills: 1 | Status: SHIPPED | OUTPATIENT
Start: 2018-10-26 | End: 2018-12-13

## 2018-10-26 NOTE — TELEPHONE ENCOUNTER
"Last Written Prescription Date:  8/22/18  Last Fill Quantity: 30,  # refills: 1   Last office visit: 8/22/2018 with prescribing provider:  Christiano Braun   Future Office Visit:      Requested Prescriptions   Pending Prescriptions Disp Refills     citalopram (CELEXA) 40 MG tablet [Pharmacy Med Name: CITALOPRAM HYDROBROMIDE 40MG TABS] 30 tablet 1     Sig: TAKE ONE TABLET BY MOUTH ONCE DAILY    SSRIs Protocol Failed    10/24/2018  4:54 PM       Failed - PHQ-9 score less than 5 in past 6 months    Please review last PHQ-9 score.          Passed - Patient is age 18 or older       Passed - Recent (6 mo) or future (30 days) visit within the authorizing provider's specialty    Patient had office visit in the last 6 months or has a visit in the next 30 days with authorizing provider or within the authorizing provider's specialty.  See \"Patient Info\" tab in inbasket, or \"Choose Columns\" in Meds & Orders section of the refill encounter.            Routing refill request to provider for review/approval because:  Labs out of range:  PHQ-9    PHQ-9 SCORE 8/1/2018 8/22/2018   Total Score 21 20       Griselda Branham RN    "

## 2018-11-13 ENCOUNTER — TELEPHONE (OUTPATIENT)
Dept: AUDIOLOGY | Facility: CLINIC | Age: 68
End: 2018-11-13

## 2018-11-13 NOTE — TELEPHONE ENCOUNTER
Pt states that he brought in his hearing aid over a month and a half ago and still have not heard anything on what is being done with it.

## 2018-11-15 NOTE — TELEPHONE ENCOUNTER
Spoke with Mr. Santiago regarding his hearing aid repair. Expect it back from [facility] in 1-2 weeks, there was a problem with our account that has been resolved, resulted in the hearing aid being held and not repaired.  I will call the patient to schedule a  appointment as soon as the hearing aid is received from repair.    Andi Geronimo.  [place] Licensed Audiologist #[id number]

## 2018-11-28 DIAGNOSIS — F33.0 MAJOR DEPRESSIVE DISORDER, RECURRENT EPISODE, MILD (H): ICD-10-CM

## 2018-11-28 RX ORDER — CITALOPRAM HYDROBROMIDE 40 MG/1
40 TABLET ORAL DAILY
Qty: 30 TABLET | Refills: 1 | Status: CANCELLED | OUTPATIENT
Start: 2018-11-28

## 2018-11-30 NOTE — TELEPHONE ENCOUNTER
Drake returns call and message is relayed.  Drake states understanding and declines making an appointment at this time.

## 2018-11-30 NOTE — TELEPHONE ENCOUNTER
"Requested Prescriptions   Pending Prescriptions Disp Refills     citalopram (CELEXA) 40 MG tablet 30 tablet 1    Last Written Prescription Date:  10/26/18  Last Fill Quantity: 30,  # refills: 1   Last office visit: 8/22/2018 with prescribing provider:     Future Office Visit:     Sig: Take 1 tablet (40 mg) by mouth daily    SSRIs Protocol Failed    11/28/2018  1:25 PM       Failed - PHQ-9 score less than 5 in past 6 months    Please review last PHQ-9 score.          Passed - Patient is age 18 or older       Passed - Recent (6 mo) or future (30 days) visit within the authorizing provider's specialty    Patient had office visit in the last 6 months or has a visit in the next 30 days with authorizing provider or within the authorizing provider's specialty.  See \"Patient Info\" tab in inbasket, or \"Choose Columns\" in Meds & Orders section of the refill encounter.            Routing refill request to provider for review/approval because:  Labs out of range:  PHQ-9  Patient needs to be seen because:  Patient was to follow up in Sept.  Patient should have 1 more #30 refill at pharmacy.    Will forward to schedulers to schedule patient for OV.  Shaista Guillen RN            "

## 2018-12-07 ENCOUNTER — TELEPHONE (OUTPATIENT)
Dept: FAMILY MEDICINE | Facility: CLINIC | Age: 68
End: 2018-12-07

## 2018-12-07 NOTE — TELEPHONE ENCOUNTER
Reason for Call:  Other call back    Detailed comments: Pt calling stating he just picked up his prescription for antidepressants on 11/28 and on there it says to schedule an appt with Dr. Braun before he can get anymore. Drake is leaving town on 12/11 and won't be back home til after the first of January. Can you fill them for him til then? Dr. Braun is out of office the beginning of next week. Drake is scheduled for an appt on 1/3/19. Please call him and advise.     Phone Number Patient can be reached at: Home number on file [phone] (home)    Best Time: anytime    Can we leave a detailed message on this number? YES    Call taken on 12/7/2018 at 9:33 AM by Nicole Zhao

## 2018-12-07 NOTE — TELEPHONE ENCOUNTER
Patient aware he can wait to be seen in January when he returns and we will fill his meds until he gets back.

## 2018-12-10 ENCOUNTER — OFFICE VISIT (OUTPATIENT)
Dept: AUDIOLOGY | Facility: CLINIC | Age: 68
End: 2018-12-10
Payer: MEDICARE

## 2018-12-13 DIAGNOSIS — F33.0 MAJOR DEPRESSIVE DISORDER, RECURRENT EPISODE, MILD (H): ICD-10-CM

## 2018-12-14 RX ORDER — CITALOPRAM HYDROBROMIDE 40 MG/1
40 TABLET ORAL DAILY
Qty: 30 TABLET | Refills: 0 | Status: SHIPPED | OUTPATIENT
Start: 2018-12-14 | End: 2019-01-03

## 2018-12-14 NOTE — TELEPHONE ENCOUNTER
"Requested Prescriptions   Pending Prescriptions Disp Refills     citalopram (CELEXA) 40 MG tablet 30 tablet 1    Last Written Prescription Date:  10/26/189  Last Fill Quantity: 30,  # refills: 1   Last office visit: 8/22/2018 with prescribing provider:     Future Office Visit:   Next 5 appointments (look out 90 days)    Jan 03, 2019  9:20 AM CST  Office Visit with Christiano Braun MD  [facility] ([address] [address] [phone]          Sig: Take 1 tablet (40 mg) by mouth daily    SSRIs Protocol Failed - 12/13/2018  1:36 PM       Failed - PHQ-9 score less than 5 in past 6 months    Please review last PHQ-9 score.   PHQ-9 score:    PHQ-9 SCORE 8/22/2018   PHQ-9 Total Score 20   .       Passed - Patient is age 18 or older       Passed - Recent (6 mo) or future (30 days) visit within the authorizing provider's specialty    Patient had office visit in the last 6 months or has a visit in the next 30 days with authorizing provider or within the authorizing provider's specialty.  See \"Patient Info\" tab in inbasket, or \"Choose Columns\" in Meds & Orders section of the refill encounter.            Routing refill request to provider for review/approval because:  Labs out of range:  PHQ 9    T'd up 1 month for provider review.  Meri Goyal RN            "

## 2018-12-26 DIAGNOSIS — E78.5 HYPERLIPIDEMIA LDL GOAL <130: ICD-10-CM

## 2018-12-26 DIAGNOSIS — N40.1 BENIGN PROSTATIC HYPERPLASIA WITH LOWER URINARY TRACT SYMPTOMS, SYMPTOM DETAILS UNSPECIFIED: ICD-10-CM

## 2018-12-28 RX ORDER — ATORVASTATIN CALCIUM 20 MG/1
TABLET, FILM COATED ORAL
Qty: 90 TABLET | Refills: 0 | Status: SHIPPED | OUTPATIENT
Start: 2018-12-28 | End: 2019-04-10

## 2018-12-28 RX ORDER — TAMSULOSIN HYDROCHLORIDE 0.4 MG/1
CAPSULE ORAL
Qty: 180 CAPSULE | Refills: 0 | Status: SHIPPED | OUTPATIENT
Start: 2018-12-28 | End: 2019-03-26

## 2018-12-28 NOTE — TELEPHONE ENCOUNTER
"atorvastatin  Last Written Prescription Date:  12/15/2017  Last Fill Quantity: 90,  # refills: 3   Last office visit: 8/22/2018 with prescribing provider:      Future Office Visit:   Next 5 appointments (look out 90 days)    Jan 03, 2019  9:20 AM CST  Office Visit with Christiano Braun MD  [address] [address]  [phone]         Requested Prescriptions   Pending Prescriptions Disp Refills     atorvastatin (LIPITOR) 20 MG tablet [Pharmacy Med Name: ATORVASTATIN 20MG TABS] 90 tablet 3     Sig: TAKE ONE TABLET BY MOUTH ONCE DAILY    Statins Protocol Failed - 12/26/2018 10:26 AM       Failed - LDL on file in past 12 months    Recent Labs   Lab Test 04/24/17  0922   LDL 60            Passed - No abnormal creatine kinase in past 12 months    No lab results found.            Passed - Recent (12 mo) or future (30 days) visit within the authorizing provider's specialty    Patient had office visit in the last 12 months or has a visit in the next 30 days with authorizing provider or within the authorizing provider's specialty.  See \"Patient Info\" tab in inbasket, or \"Choose Columns\" in Meds & Orders section of the refill encounter.         Passed - Patient is age 18 or older           tamsulosin  Last Written Prescription Date:  06/19/2018  Last Fill Quantity: 180,  # refills: 1   Last office visit: 8/22/2018 with prescribing provider:      Future Office Visit:   Next 5 appointments (look out 90 days)    Jan 03, 2019  9:20 AM CST  Office Visit with Christiano Braun MD  [facility] ([address] [address] [phone]              tamsulosin (FLOMAX) 0.4 MG capsule [Pharmacy Med Name: TAMSULOSIN HCL 0.4MG CAPS] 180 capsule 1     Sig: TAKE ONE CAPSULE BY MOUTH TWICE A DAY    Alpha Blockers Passed - 12/26/2018 10:26 AM       Passed - Blood pressure under 140/90 in past 12 months    " "BP Readings from Last 3 Encounters:   08/22/18 116/60   08/01/18 122/60   07/25/18 138/89            Passed - Recent (12 mo) or future (30 days) visit within the authorizing provider's specialty    Patient had office visit in the last 12 months or has a visit in the next 30 days with authorizing provider or within the authorizing provider's specialty.  See \"Patient Info\" tab in inbasket, or \"Choose Columns\" in Meds & Orders section of the refill encounter.             Passed - Patient does not have Tadalafil, Vardenafil, or Sildenafil on their medication list       Passed - Patient is 18 years of age or older          Prescription approved per [facility] Refill Protocol.      Nisha Braun RN on 12/28/2018 at 2:45 PM    "

## 2019-01-03 ENCOUNTER — OFFICE VISIT (OUTPATIENT)
Dept: FAMILY MEDICINE | Facility: CLINIC | Age: 69
End: 2019-01-03
Payer: MEDICARE

## 2019-01-03 VITALS
HEART RATE: 80 BPM | DIASTOLIC BLOOD PRESSURE: 60 MMHG | HEIGHT: 71 IN | BODY MASS INDEX: 27.29 KG/M2 | RESPIRATION RATE: 12 BRPM | WEIGHT: 194.9 LBS | TEMPERATURE: 98.1 F | OXYGEN SATURATION: 96 % | SYSTOLIC BLOOD PRESSURE: 120 MMHG

## 2019-01-03 DIAGNOSIS — Z23 NEED FOR PROPHYLACTIC VACCINATION AND INOCULATION AGAINST INFLUENZA: ICD-10-CM

## 2019-01-03 DIAGNOSIS — Z12.5 SCREENING FOR PROSTATE CANCER: ICD-10-CM

## 2019-01-03 DIAGNOSIS — E78.5 HYPERLIPIDEMIA LDL GOAL <130: ICD-10-CM

## 2019-01-03 DIAGNOSIS — F33.0 MAJOR DEPRESSIVE DISORDER, RECURRENT EPISODE, MILD (H): Primary | ICD-10-CM

## 2019-01-03 LAB
ALBUMIN SERPL-MCNC: 3.8 G/DL (ref 3.4–5)
ALP SERPL-CCNC: 71 U/L (ref 40–150)
ALT SERPL W P-5'-P-CCNC: 26 U/L (ref 0–70)
ANION GAP SERPL CALCULATED.3IONS-SCNC: 4 MMOL/L (ref 3–14)
AST SERPL W P-5'-P-CCNC: 18 U/L (ref 0–45)
BILIRUB SERPL-MCNC: 0.4 MG/DL (ref 0.2–1.3)
BUN SERPL-MCNC: 21 MG/DL (ref 7–30)
CALCIUM SERPL-MCNC: 8.8 MG/DL (ref 8.5–10.1)
CHLORIDE SERPL-SCNC: 104 MMOL/L (ref 94–109)
CHOLEST SERPL-MCNC: 137 MG/DL
CO2 SERPL-SCNC: 33 MMOL/L (ref 20–32)
CREAT SERPL-MCNC: 1.03 MG/DL (ref 0.66–1.25)
GFR SERPL CREATININE-BSD FRML MDRD: 75 ML/MIN/{1.73_M2}
GLUCOSE SERPL-MCNC: 101 MG/DL (ref 70–99)
HDLC SERPL-MCNC: 50 MG/DL
LDLC SERPL CALC-MCNC: 70 MG/DL
NONHDLC SERPL-MCNC: 87 MG/DL
POTASSIUM SERPL-SCNC: 4.3 MMOL/L (ref 3.4–5.3)
PROT SERPL-MCNC: 7.1 G/DL (ref 6.8–8.8)
PSA SERPL-ACNC: 1.17 UG/L (ref 0–4)
SODIUM SERPL-SCNC: 141 MMOL/L (ref 133–144)
TRIGL SERPL-MCNC: 84 MG/DL

## 2019-01-03 PROCEDURE — 80061 LIPID PANEL: CPT | Performed by: FAMILY MEDICINE

## 2019-01-03 PROCEDURE — G0008 ADMIN INFLUENZA VIRUS VAC: HCPCS | Performed by: FAMILY MEDICINE

## 2019-01-03 PROCEDURE — 99214 OFFICE O/P EST MOD 30 MIN: CPT | Mod: 25 | Performed by: FAMILY MEDICINE

## 2019-01-03 PROCEDURE — 36415 COLL VENOUS BLD VENIPUNCTURE: CPT | Performed by: FAMILY MEDICINE

## 2019-01-03 PROCEDURE — 80053 COMPREHEN METABOLIC PANEL: CPT | Performed by: FAMILY MEDICINE

## 2019-01-03 PROCEDURE — G0103 PSA SCREENING: HCPCS | Performed by: FAMILY MEDICINE

## 2019-01-03 PROCEDURE — 90662 IIV NO PRSV INCREASED AG IM: CPT | Performed by: FAMILY MEDICINE

## 2019-01-03 RX ORDER — CITALOPRAM HYDROBROMIDE 40 MG/1
40 TABLET ORAL DAILY
Qty: 90 TABLET | Refills: 3 | Status: SHIPPED | OUTPATIENT
Start: 2019-01-03 | End: 2019-12-26

## 2019-01-03 ASSESSMENT — PATIENT HEALTH QUESTIONNAIRE - PHQ9: SUM OF ALL RESPONSES TO PHQ QUESTIONS 1-9: 3

## 2019-01-03 ASSESSMENT — MIFFLIN-ST. JEOR: SCORE: 1690.31

## 2019-01-03 NOTE — RESULT ENCOUNTER NOTE
PSA was normal cholesterol was good at 137 chemistry panel was essentially normal blood sugar was 101 right on the border.

## 2019-01-03 NOTE — LETTER
January 3, 2019      Drake Santiago  [id number] [address] [address]        Dear ,    We are writing to inform you of your test results.    PSA was normal cholesterol was good at 137 chemistry panel was essentially normal blood sugar was 101 right on the border.     Resulted Orders   Lipid Profile   Result Value Ref Range    Cholesterol 137 <200 mg/dL    Triglycerides 84 <150 mg/dL      Comment:      Fasting specimen    HDL Cholesterol 50 >39 mg/dL    LDL Cholesterol Calculated 70 <100 mg/dL      Comment:      Desirable:       <100 mg/dl    Non HDL Cholesterol 87 <130 mg/dL   Comprehensive metabolic panel (BMP + Alb, Alk Phos, ALT, AST, Total. Bili, TP)   Result Value Ref Range    Sodium 141 133 - 144 mmol/L    Potassium 4.3 3.4 - 5.3 mmol/L    Chloride 104 94 - 109 mmol/L    Carbon Dioxide 33 (H) 20 - 32 mmol/L    Anion Gap 4 3 - 14 mmol/L    Glucose 101 (H) 70 - 99 mg/dL      Comment:      Fasting specimen    Urea Nitrogen 21 7 - 30 mg/dL    Creatinine 1.03 0.66 - 1.25 mg/dL    GFR Estimate 75 >60 mL/min/[1.73_m2]      Comment:      Non  GFR Calc  Starting 12/18/2018, serum creatinine based estimated GFR (eGFR) will be   calculated using the Chronic Kidney Disease Epidemiology Collaboration   (CKD-EPI) equation.      GFR Estimate If Black 87 >60 mL/min/[1.73_m2]      Comment:       GFR Calc  Starting 12/18/2018, serum creatinine based estimated GFR (eGFR) will be   calculated using the Chronic Kidney Disease Epidemiology Collaboration   (CKD-EPI) equation.      Calcium 8.8 8.5 - 10.1 mg/dL    Bilirubin Total 0.4 0.2 - 1.3 mg/dL    Albumin 3.8 3.4 - 5.0 g/dL    Protein Total 7.1 6.8 - 8.8 g/dL    Alkaline Phosphatase 71 40 - 150 U/L    ALT 26 0 - 70 U/L    AST 18 0 - 45 U/L   PSA, screen   Result Value Ref Range    PSA 1.17 0 - 4 ug/L      Comment:      Assay Method:  Chemiluminescence using Siemens Vista analyzer       If you have any questions or concerns,  please call the clinic at the number listed above.       Sincerely,        Christiano Braun MD

## 2019-01-15 ENCOUNTER — OFFICE VISIT (OUTPATIENT)
Dept: AUDIOLOGY | Facility: CLINIC | Age: 69
End: 2019-01-15

## 2019-01-15 DIAGNOSIS — H90.3 SENSORINEURAL HEARING LOSS, BILATERAL: Primary | ICD-10-CM

## 2019-01-15 PROCEDURE — 99207 ZZC NO CHARGE LOS: CPT | Performed by: AUDIOLOGIST

## 2019-01-15 PROCEDURE — V5014 HEARING AID REPAIR/MODIFYING: HCPCS | Mod: RT | Performed by: AUDIOLOGIST

## 2019-01-15 NOTE — PROGRESS NOTES
Hearing Aid Check     SUBJECTIVE:  Drake Santiago is a 68 year old year old male, seen today to  repaired right (SN: [id number]) and left (SN: [id number]) [employer] [address] [employer] hearing aids  at [facility] [place]. Devices were reset, verified 40 dB receivers, applied large closed domes.        OBJECTIVE:  Otoscopic exam indicates ears are clear of cerumen bilaterally      Real ear measures were obtained and adjustments were made to match NAL-NL1 speech targets with a 65 dB input, a 55 dB input was audible and a 85 dB input was loud but did not exceed predicted loudness discomfort levels.      He has a back up set of hearing aids also [employer] [address] RICs right SN: [id number] and left [id number], reset, verified 40 dB receivers and applied large closed domes. Obtained real ear measures making minor adjustments to match NAL-NL1 speech targets with a 65 dB input, a 55 dB input was audible and a 85 dB input was loud but did not exceed predicted loudness discomfort levels.     Mr. Santiago reported good sound quality, balanced sound and speech was comfortable and no feedback was observed (both sets).    PLAN: Return to clinic in 2 weeks for follow up and adjustments as needed.    Andi Geronimo.  [place] Licensed Audiologist #[id number]    CHARGES     Quantity 2 RT LT @ $275 each total $550 (patient paid day of service)

## 2019-01-24 ENCOUNTER — OFFICE VISIT (OUTPATIENT)
Dept: AUDIOLOGY | Facility: CLINIC | Age: 69
End: 2019-01-24
Payer: MEDICARE

## 2019-01-24 DIAGNOSIS — H90.3 SENSORINEURAL HEARING LOSS, BILATERAL: Primary | ICD-10-CM

## 2019-01-24 PROCEDURE — V5299 HEARING SERVICE: HCPCS | Performed by: AUDIOLOGIST

## 2019-01-24 PROCEDURE — 99207 ZZC NO CHARGE LOS: CPT | Performed by: AUDIOLOGIST

## 2019-01-25 NOTE — PROGRESS NOTES
Hearing Aid Check     SUBJECTIVE:  Drake Santiago is a 68 year old year old male, seen today for a hearing aid check at [facility]. Patient reports he is not able to adjust volume, and is not getting spoken alerts. He is not experiencing problems with feedback (after resetting hearing aids and reprogramming).     OBJECTIVE:  Connected to programming software, added 3 manual programs (hearing aids do not have volume control).     Program 1:  Normal  Program 2:  Normal (+5)  Program 3:  Normal (-5)  Program 4:  Restaurant    Added voice alerts per patient request, saved settings to back up hearing aids.     PLAN: Return to clinic as needed for hearing aid maintenance and programming.    Andi Geronimo.  [place] Licensed Audiologist #[id number]

## 2019-02-09 DIAGNOSIS — F33.0 MAJOR DEPRESSIVE DISORDER, RECURRENT EPISODE, MILD (H): ICD-10-CM

## 2019-02-11 RX ORDER — CITALOPRAM HYDROBROMIDE 40 MG/1
TABLET ORAL
Qty: 30 TABLET | Refills: 0 | OUTPATIENT
Start: 2019-02-11

## 2019-02-11 NOTE — TELEPHONE ENCOUNTER
"Requested Prescriptions   Pending Prescriptions Disp Refills     citalopram (CELEXA) 40 MG tablet [Pharmacy Med Name: CITALOPRAM HYDROBROMIDE 40MG TABS] 30 tablet 0    Last Written Prescription Date:  1/3/19  Last Fill Quantity: 90,  # refills: 3   Last office visit: 1/3/2019 with prescribing provider:     Future Office Visit:     Sig: TAKE ONE TABLET BY MOUTH ONCE DAILY    SSRIs Protocol Passed - 2/9/2019  1:03 AM       Passed - PHQ-9 score less than 5 in past 6 months    Please review last PHQ-9 score.          Passed - Medication is active on med list       Passed - Patient is age 18 or older       Passed - Recent (6 mo) or future (30 days) visit within the authorizing provider's specialty    Patient had office visit in the last 6 months or has a visit in the next 30 days with authorizing provider or within the authorizing provider's specialty.  See \"Patient Info\" tab in inbasket, or \"Choose Columns\" in Meds & Orders section of the refill encounter.          DENIED  REFILLS CURRENT  Meri Goyal RN      "

## 2019-03-26 DIAGNOSIS — N40.1 BENIGN PROSTATIC HYPERPLASIA WITH LOWER URINARY TRACT SYMPTOMS, SYMPTOM DETAILS UNSPECIFIED: Primary | ICD-10-CM

## 2019-03-27 RX ORDER — TAMSULOSIN HYDROCHLORIDE 0.4 MG/1
CAPSULE ORAL
Qty: 180 CAPSULE | Refills: 2 | Status: SHIPPED | OUTPATIENT
Start: 2019-03-27 | End: 2019-12-26

## 2019-03-27 NOTE — TELEPHONE ENCOUNTER
"FLOMAX  Last Written Prescription Date:  12/28/18  Last Fill Quantity: 180,  # refills: 0   Last office visit: 1/3/2019 with prescribing provider:     Future Office Visit:  NONE  Requested Prescriptions   Pending Prescriptions Disp Refills     tamsulosin (FLOMAX) 0.4 MG capsule [Pharmacy Med Name: TAMSULOSIN HCL 0.4MG CAPS] 180 capsule 0     Sig: TAKE ONE CAPSULE BY MOUTH TWICE A DAY    Alpha Blockers Passed - 3/26/2019  1:03 AM       Passed - Blood pressure under 140/90 in past 12 months    BP Readings from Last 3 Encounters:   01/03/19 120/60   08/22/18 116/60   08/01/18 122/60          Passed - Recent (12 mo) or future (30 days) visit within the authorizing provider's specialty    Patient had office visit in the last 12 months or has a visit in the next 30 days with authorizing provider or within the authorizing provider's specialty.  See \"Patient Info\" tab in inbasket, or \"Choose Columns\" in Meds & Orders section of the refill encounter.         Passed - Patient does not have Tadalafil, Vardenafil, or Sildenafil on their medication list       Passed - Medication is active on med list       Passed - Patient is 18 years of age or older        Prescription approved per [facility] Refill Protocol...........ESDRAS Alcantara      "

## 2019-04-10 DIAGNOSIS — E78.5 HYPERLIPIDEMIA LDL GOAL <130: Primary | ICD-10-CM

## 2019-04-11 RX ORDER — ATORVASTATIN CALCIUM 20 MG/1
TABLET, FILM COATED ORAL
Qty: 90 TABLET | Refills: 2 | Status: SHIPPED | OUTPATIENT
Start: 2019-04-11 | End: 2019-12-26

## 2019-04-11 NOTE — TELEPHONE ENCOUNTER
"LIPITOR  Last Written Prescription Date:  12/28/18  Last Fill Quantity: 90,  # refills: 0   Last office visit: 1/3/2019 with prescribing provider:     Future Office Visit:  NONE  Requested Prescriptions   Pending Prescriptions Disp Refills     atorvastatin (LIPITOR) 20 MG tablet [Pharmacy Med Name: ATORVASTATIN 20MG TABS] 90 tablet 0     Sig: TAKE ONE TABLET BY MOUTH ONCE DAILY       Statins Protocol Passed - 4/10/2019 12:04 AM        Passed - LDL on file in past 12 months     Recent Labs   Lab Test 01/03/19  1000   LDL 70           Passed - No abnormal creatine kinase in past 12 months     No lab results found.         Passed - Recent (12 mo) or future (30 days) visit within the authorizing provider's specialty     Patient had office visit in the last 12 months or has a visit in the next 30 days with authorizing provider or within the authorizing provider's specialty.  See \"Patient Info\" tab in inbasket, or \"Choose Columns\" in Meds & Orders section of the refill encounter.          Passed - Medication is active on med list        Passed - Patient is age 18 or older        Prescription approved per [facility] Refill Protocol.  ESDRAS Alcantara      "

## 2019-09-17 ENCOUNTER — TELEPHONE (OUTPATIENT)
Dept: FAMILY MEDICINE | Facility: CLINIC | Age: 69
End: 2019-09-17

## 2019-09-17 NOTE — TELEPHONE ENCOUNTER
Patient is due for a PHQ-9.  Index start date:6/02/2019  Index end date:9/30/2019    Please call patient.

## 2019-09-25 ASSESSMENT — PATIENT HEALTH QUESTIONNAIRE - PHQ9: SUM OF ALL RESPONSES TO PHQ QUESTIONS 1-9: 0

## 2019-09-25 NOTE — TELEPHONE ENCOUNTER
I have contacted the pt and completed a PHQ-9.    PHQ-9 SCORE 9/25/2019   PHQ-9 Total Score 0     Angela Bansal CMA ([facility])

## 2019-12-25 DIAGNOSIS — E78.5 HYPERLIPIDEMIA LDL GOAL <130: ICD-10-CM

## 2019-12-25 DIAGNOSIS — F33.0 MAJOR DEPRESSIVE DISORDER, RECURRENT EPISODE, MILD (H): ICD-10-CM

## 2019-12-25 DIAGNOSIS — N40.1 BENIGN PROSTATIC HYPERPLASIA WITH LOWER URINARY TRACT SYMPTOMS, SYMPTOM DETAILS UNSPECIFIED: ICD-10-CM

## 2019-12-26 RX ORDER — TAMSULOSIN HYDROCHLORIDE 0.4 MG/1
CAPSULE ORAL
Qty: 60 CAPSULE | Refills: 0 | Status: SHIPPED | OUTPATIENT
Start: 2019-12-26 | End: 2020-01-10

## 2019-12-26 RX ORDER — CITALOPRAM HYDROBROMIDE 40 MG/1
TABLET ORAL
Qty: 90 TABLET | Refills: 3 | Status: SHIPPED | OUTPATIENT
Start: 2019-12-26 | End: 2020-11-25

## 2019-12-26 RX ORDER — ATORVASTATIN CALCIUM 20 MG/1
TABLET, FILM COATED ORAL
Qty: 30 TABLET | Refills: 0 | Status: SHIPPED | OUTPATIENT
Start: 2019-12-26 | End: 2020-01-10

## 2019-12-26 NOTE — TELEPHONE ENCOUNTER
"Requested Prescriptions   Pending Prescriptions Disp Refills     atorvastatin (LIPITOR) 20 MG tablet [Pharmacy Med Name: ATORVASTATIN CALCIUM 20MG TABS] 90 tablet 2     Sig: TAKE ONE TABLET BY MOUTH ONCE DAILY   Last Written Prescription Date:  4/11/19  Last Fill Quantity: 90,  # refills: 2   Last office visit: 1/3/2019 with prescribing provider:  Kade Forrester Office Visit:        Statins Protocol Passed - 12/25/2019 12:00 AM        Passed - LDL on file in past 12 months     Recent Labs   Lab Test 01/03/19  1000   LDL 70           Passed - No abnormal creatine kinase in past 12 months     No lab results found.             Passed - Recent (12 mo) or future (30 days) visit within the authorizing provider's specialty     Patient has had an office visit with the authorizing provider or a provider within the authorizing providers department within the previous 12 mos or has a future within next 30 days. See \"Patient Info\" tab in inbasket, or \"Choose Columns\" in Meds & Orders section of the refill encounter.              Passed - Medication is active on med list        Passed - Patient is age 18 or older        citalopram (CELEXA) 40 MG tablet [Pharmacy Med Name: CITALOPRAM HYDROBROMIDE 40MG TABS] 90 tablet 3     Sig: TAKE ONE TABLET BY MOUTH ONCE DAILY   Last Written Prescription Date:  1/3/19  Last Fill Quantity: 90,  # refills: 3   Last office visit: 1/3/2019 with prescribing provider:  Kade Forrester Office Visit:        SSRIs Protocol Failed - 12/25/2019 12:00 AM        Failed - Recent (6 mo) or future (30 days) visit within the authorizing provider's specialty     Patient had office visit in the last 6 months or has a visit in the next 30 days with authorizing provider or within the authorizing provider's specialty.  See \"Patient Info\" tab in inbasket, or \"Choose Columns\" in Meds & Orders section of the refill encounter.            Passed - PHQ-9 score less than 5 in past 6 months     Please review last PHQ-9 " "score.   PHQ-9 score:    PHQ-9 SCORE 9/25/2019   PHQ-9 Total Score 0           Passed - Medication is active on med list        Passed - Patient is age 18 or older        tamsulosin (FLOMAX) 0.4 MG capsule [Pharmacy Med Name: TAMSULOSIN HCL 0.4MG CAPS] 180 capsule 2     Sig: TAKE ONE CAPSULE BY MOUTH TWICE A DAY   Last Written Prescription Date:  3/27/19  Last Fill Quantity: 180,  # refills: 2   Last office visit: 1/3/2019 with prescribing provider:  Kade   Future Office Visit:        Alpha Blockers Passed - 12/25/2019 12:00 AM        Passed - Blood pressure under 140/90 in past 12 months     BP Readings from Last 3 Encounters:   01/03/19 120/60   08/22/18 116/60   08/01/18 122/60           Passed - Recent (12 mo) or future (30 days) visit within the authorizing provider's specialty     Patient has had an office visit with the authorizing provider or a provider within the authorizing providers department within the previous 12 mos or has a future within next 30 days. See \"Patient Info\" tab in inbasket, or \"Choose Columns\" in Meds & Orders section of the refill encounter.              Passed - Patient does not have Tadalafil, Vardenafil, or Sildenafil on their medication list        Passed - Medication is active on med list        Passed - Patient is 18 years of age or older          "

## 2019-12-26 NOTE — TELEPHONE ENCOUNTER
Prescription approved per [facility] Refill Protocol.- Lipitor, Flomax    Routing refill request to provider for review/approval because:  Patient needs to be seen because:  Follow up- gabriel Valdez RN on 12/26/2019 at 11:56 AM

## 2019-12-30 DIAGNOSIS — N40.1 BENIGN PROSTATIC HYPERPLASIA WITH LOWER URINARY TRACT SYMPTOMS, SYMPTOM DETAILS UNSPECIFIED: ICD-10-CM

## 2019-12-31 RX ORDER — TAMSULOSIN HYDROCHLORIDE 0.4 MG/1
CAPSULE ORAL
Qty: 60 CAPSULE | Refills: 0 | OUTPATIENT
Start: 2019-12-31

## 2019-12-31 NOTE — TELEPHONE ENCOUNTER
flomax  Last Written Prescription Date:  12/26/2019  Last Fill Quantity: 60,  # refills: 0   Last office visit: 1/3/2019 with prescribing provider:     Future Office Visit:        Patient was given a 30 day supply and advised to make an appointment.  forwarded to scheduling.  Reyna Dawn RN BSN

## 2020-01-09 NOTE — PROGRESS NOTES
Subjective     Drake Santiago is a 69 year old male who presents to clinic today for the following health issues:    HPI     Patient is needing refills of his and flomax. He is fasting.     Hyperlipidemia Follow-Up      Are you regularly taking any medication or supplement to lower your cholesterol?   Yes- atorvastatin     Are you having muscle aches or other side effects that you think could be caused by your cholesterol lowering medication?  No    SUBJECTIVE:  Drake  is a 69 year old male who presents for: Multiple reasons today.  Needs refill on his Flomax and Lipitor and needs some blood work done.  But he also has several other issues.  His wife is quite concerned that he stops breathing at night when he sleeps and is concerned of sleep apnea.  He states he is up multiple times at night to pass his urine but feels he gets a reasonable night sleep.  Other issue he has is concerned about memory loss.  This is very subtle.  Usually just forgets what he was supposed to get when he gets to a store he is never lost his way driving somewhere or significant forgetfulness.  Of concern is his mother was diagnosed with dementia and it started around his age.  He was seen a year and a half ago for some dizziness and an MRI of his brain was showed no signs of dementia.    Past Medical History:   Diagnosis Date     BPH (benign prostatic hyperplasia)      Hyperlipidemia      Past Surgical History:   Procedure Laterality Date     HERNIORRHAPHY INGUINAL       REPAIR LACERATION LID Right 6/5/2017    Procedure: REPAIR LACERATION LID;  right lower lid laceration repair, right canalicular laceration repair      ;  Surgeon: Al Ac MD;  Location:  OR     Social History     Tobacco Use     Smoking status: Former Smoker     Smokeless tobacco: Never Used   Substance Use Topics     Alcohol use: Yes     Comment: glass of wine nightly      Current Outpatient Medications   Medication Sig Dispense Refill     atorvastatin  "(LIPITOR) 20 MG tablet Take 1 tablet (20 mg) by mouth daily 90 tablet 3     citalopram (CELEXA) 40 MG tablet TAKE ONE TABLET BY MOUTH ONCE DAILY 90 tablet 3     sildenafil (VIAGRA) 50 MG tablet Take 1 tablet (50 mg) by mouth daily as needed 12 tablet 4     tamsulosin (FLOMAX) 0.4 MG capsule TAKE ONE CAPSULE BY MOUTH TWICE A  capsule 3     DIPHENHYDRAMINE HCL PO Take 50 mg by mouth nightly as needed       IBUPROFEN PO          REVIEW OF SYSTEMS:   5 point ROS negative except as noted above in HPI, including Gen., Resp, CV, GI &  system review.     OBJECTIVE:  Vitals: /62 (BP Location: Left arm, Patient Position: Sitting, Cuff Size: Adult Regular)   Pulse 75   Temp 97  F (36.1  C) (Temporal)   Ht 1.772 m (5' 9.75\")   Wt 88.5 kg (195 lb)   SpO2 96%   BMI 28.18 kg/m    BMI= Body mass index is 28.18 kg/m .  He is alert and appears well.  Head is normocephalic.  Lungs are clear.  Heart regular rhythm no murmur.  Extremities normal.  Skin clear.  Lipid panel is normal chemistry panel is normal.    ASSESSMENT:  #1 sleep apnea #2 memory loss #3 hyperlipidemia #4 prostatic hypertrophy #5erectile dysfunction    PLAN:  We will notify of his lab results.  Discussed options for pursuing his memory issues.  Neuropsychological consult could be ordered but he wants to wait a little bit.  He thinks maybe he is just overwhelmed with things on his mind at times we will set him up for a sleep consult.  Ordered some Viagra which she has had before.  Follow-up as needed.        Christiano Braun MD  [facility]            "

## 2020-01-10 ENCOUNTER — OFFICE VISIT (OUTPATIENT)
Dept: FAMILY MEDICINE | Facility: CLINIC | Age: 70
End: 2020-01-10
Payer: COMMERCIAL

## 2020-01-10 VITALS
WEIGHT: 195 LBS | HEIGHT: 70 IN | HEART RATE: 75 BPM | DIASTOLIC BLOOD PRESSURE: 62 MMHG | TEMPERATURE: 97 F | BODY MASS INDEX: 27.92 KG/M2 | SYSTOLIC BLOOD PRESSURE: 108 MMHG | OXYGEN SATURATION: 96 %

## 2020-01-10 DIAGNOSIS — N52.9 ERECTILE DYSFUNCTION, UNSPECIFIED ERECTILE DYSFUNCTION TYPE: ICD-10-CM

## 2020-01-10 DIAGNOSIS — N40.1 BENIGN PROSTATIC HYPERPLASIA WITH LOWER URINARY TRACT SYMPTOMS, SYMPTOM DETAILS UNSPECIFIED: ICD-10-CM

## 2020-01-10 DIAGNOSIS — E78.5 HYPERLIPIDEMIA LDL GOAL <130: ICD-10-CM

## 2020-01-10 DIAGNOSIS — R41.3 MEMORY LOSS: Primary | ICD-10-CM

## 2020-01-10 DIAGNOSIS — G47.30 SLEEP APNEA, UNSPECIFIED TYPE: ICD-10-CM

## 2020-01-10 DIAGNOSIS — Z12.5 SCREENING FOR PROSTATE CANCER: ICD-10-CM

## 2020-01-10 LAB
ALBUMIN SERPL-MCNC: 3.8 G/DL (ref 3.4–5)
ALP SERPL-CCNC: 73 U/L (ref 40–150)
ALT SERPL W P-5'-P-CCNC: 21 U/L (ref 0–70)
ANION GAP SERPL CALCULATED.3IONS-SCNC: 3 MMOL/L (ref 3–14)
AST SERPL W P-5'-P-CCNC: 16 U/L (ref 0–45)
BILIRUB SERPL-MCNC: 0.6 MG/DL (ref 0.2–1.3)
BUN SERPL-MCNC: 24 MG/DL (ref 7–30)
CALCIUM SERPL-MCNC: 8.6 MG/DL (ref 8.5–10.1)
CHLORIDE SERPL-SCNC: 106 MMOL/L (ref 94–109)
CHOLEST SERPL-MCNC: 176 MG/DL
CO2 SERPL-SCNC: 32 MMOL/L (ref 20–32)
CREAT SERPL-MCNC: 0.95 MG/DL (ref 0.66–1.25)
GFR SERPL CREATININE-BSD FRML MDRD: 83 ML/MIN/{1.73_M2}
GLUCOSE SERPL-MCNC: 102 MG/DL (ref 70–99)
HDLC SERPL-MCNC: 55 MG/DL
LDLC SERPL CALC-MCNC: 102 MG/DL
NONHDLC SERPL-MCNC: 121 MG/DL
POTASSIUM SERPL-SCNC: 4.1 MMOL/L (ref 3.4–5.3)
PROT SERPL-MCNC: 7.2 G/DL (ref 6.8–8.8)
PSA SERPL-ACNC: 1.24 UG/L (ref 0–4)
SODIUM SERPL-SCNC: 141 MMOL/L (ref 133–144)
TRIGL SERPL-MCNC: 95 MG/DL

## 2020-01-10 PROCEDURE — 36415 COLL VENOUS BLD VENIPUNCTURE: CPT | Performed by: FAMILY MEDICINE

## 2020-01-10 PROCEDURE — G0103 PSA SCREENING: HCPCS | Performed by: FAMILY MEDICINE

## 2020-01-10 PROCEDURE — 80061 LIPID PANEL: CPT | Performed by: FAMILY MEDICINE

## 2020-01-10 PROCEDURE — 99214 OFFICE O/P EST MOD 30 MIN: CPT | Performed by: FAMILY MEDICINE

## 2020-01-10 PROCEDURE — 80053 COMPREHEN METABOLIC PANEL: CPT | Performed by: FAMILY MEDICINE

## 2020-01-10 RX ORDER — SILDENAFIL 50 MG/1
50 TABLET, FILM COATED ORAL DAILY PRN
Qty: 12 TABLET | Refills: 4 | Status: SHIPPED | OUTPATIENT
Start: 2020-01-10 | End: 2021-03-15

## 2020-01-10 RX ORDER — ATORVASTATIN CALCIUM 20 MG/1
20 TABLET, FILM COATED ORAL DAILY
Qty: 90 TABLET | Refills: 3 | Status: SHIPPED | OUTPATIENT
Start: 2020-01-10 | End: 2020-11-25

## 2020-01-10 RX ORDER — TAMSULOSIN HYDROCHLORIDE 0.4 MG/1
CAPSULE ORAL
Qty: 180 CAPSULE | Refills: 3 | Status: SHIPPED | OUTPATIENT
Start: 2020-01-10 | End: 2020-11-25

## 2020-01-10 ASSESSMENT — MIFFLIN-ST. JEOR: SCORE: 1661.79

## 2020-01-10 NOTE — LETTER
My Depression Action Plan  Name: Drake Santiago   Date of Birth 1950  Date: 1/10/2020    My doctor: Christiano Braun   My clinic: [address] [address] [phone]          GREEN    ZONE   Good Control    What it looks like:     Things are going generally well. You have normal up s and down s. You may even feel depressed from time to time, but bad moods usually last less than a day.   What you need to do:  1. Continue to care for yourself (see self care plan)  2. Check your depression survival kit and update it as needed  3. Follow your physician s recommendations including any medication.  4. Do not stop taking medication unless you consult with your physician first.           YELLOW         ZONE Getting Worse    What it looks like:     Depression is starting to interfere with your life.     It may be hard to get out of bed; you may be starting to isolate yourself from others.    Symptoms of depression are starting to last most all day and this has happened for several days.     You may have suicidal thoughts but they are not constant.   What you need to do:     1. Call your care team, your response to treatment will improve if you keep your care team informed of your progress. Yellow periods are signs an adjustment may need to be made.     2. Continue your self-care, even if you have to fake it!    3. Talk to someone in your support network    4. Open up your depression survival kit           RED    ZONE Medical Alert - Get Help    What it looks like:     Depression is seriously interfering with your life.     You may experience these or other symptoms: You can t get out of bed most days, can t work or engage in other necessary activities, you have trouble taking care of basic hygiene, or basic responsibilities, thoughts of suicide or death that will not go away, self-injurious behavior.     What you need to do:  1. Call your care team and  request a same-day appointment. If they are not available (weekends or after hours) call your local crisis line, emergency room or 911.            Depression Self Care Plan / Survival Kit    Self-Care for Depression  Here s the deal. Your body and mind are really not as separate as most people think.  What you do and think affects how you feel and how you feel influences what you do and think. This means if you do things that people who feel good do, it will help you feel better.  Sometimes this is all it takes.  There is also a place for medication and therapy depending on how severe your depression is, so be sure to consult with your medical provider and/ or Behavioral Health Consultant if your symptoms are worsening or not improving.     In order to better manage my stress, I will:    Exercise  Get some form of exercise, every day. This will help reduce pain and release endorphins, the  feel good  chemicals in your brain. This is almost as good as taking antidepressants!  This is not the same as joining a gym and then never going! (they count on that by the way ) It can be as simple as just going for a walk or doing some gardening, anything that will get you moving.      Hygiene   Maintain good hygiene (Get out of bed in the morning, Make your bed, Brush your teeth, Take a shower, and Get dressed like you were going to work, even if you are unemployed).  If your clothes don't fit try to get ones that do.    Diet  I will strive to eat foods that are good for me, drink plenty of water, and avoid excessive sugar, caffeine, alcohol, and other mood-altering substances.  Some foods that are helpful in depression are: complex carbohydrates, B vitamins, flaxseed, fish or fish oil, fresh fruits and vegetables.    Psychotherapy  I agree to participate in Individual Therapy (if recommended).    Medication  If prescribed medications, I agree to take them.  Missing doses can result in serious side effects.  I understand that  drinking alcohol, or other illicit drug use, may cause potential side effects.  I will not stop my medication abruptly without first discussing it with my provider.    Staying Connected With Others  I will stay in touch with my friends, family members, and my primary care provider/team.    Use your imagination  Be creative.  We all have a creative side; it doesn t matter if it s oil painting, sand castles, or mud pies! This will also kick up the endorphins.    Witness Beauty  (AKA stop and smell the roses) Take a look outside, even in mid-winter. Notice colors, textures. Watch the squirrels and birds.     Service to others  Be of service to others.  There is always someone else in need.  By helping others we can  get out of ourselves  and remember the really important things.  This also provides opportunities for practicing all the other parts of the program.    Humor  Laugh and be silly!  Adjust your TV habits for less news and crime-drama and more comedy.    Control your stress  Try breathing deep, massage therapy, biofeedback, and meditation. Find time to relax each day.     My support system    Clinic Contact:  Phone number:    Contact 1:  Phone number:    Contact 2:  Phone number:    [facility]/:  Phone number:    Therapist:  Phone number:    Local crisis center:    Phone number:    Other community support:  Phone number:

## 2020-01-20 ENCOUNTER — TELEPHONE (OUTPATIENT)
Dept: FAMILY MEDICINE | Facility: CLINIC | Age: 70
End: 2020-01-20

## 2020-01-20 NOTE — TELEPHONE ENCOUNTER
----- Message from Christiano Braun MD sent at 1/20/2020 10:13 AM CST -----  Lab work including PSA chemistry panel and cholesterol all looked good

## 2020-02-18 ENCOUNTER — TELEPHONE (OUTPATIENT)
Dept: FAMILY MEDICINE | Facility: CLINIC | Age: 70
End: 2020-02-18

## 2020-03-10 ENCOUNTER — HEALTH MAINTENANCE LETTER (OUTPATIENT)
Age: 70
End: 2020-03-10

## 2020-04-17 ENCOUNTER — VIRTUAL VISIT (OUTPATIENT)
Dept: SLEEP MEDICINE | Facility: CLINIC | Age: 70
End: 2020-04-17
Attending: FAMILY MEDICINE
Payer: COMMERCIAL

## 2020-04-17 VITALS — HEIGHT: 70 IN | WEIGHT: 185 LBS | BODY MASS INDEX: 26.48 KG/M2

## 2020-04-17 DIAGNOSIS — G47.30 SLEEP APNEA, UNSPECIFIED TYPE: ICD-10-CM

## 2020-04-17 PROCEDURE — 99203 OFFICE O/P NEW LOW 30 MIN: CPT | Mod: 95 | Performed by: PHYSICIAN ASSISTANT

## 2020-04-17 ASSESSMENT — MIFFLIN-ST. JEOR: SCORE: 1620.4

## 2020-04-17 NOTE — PROGRESS NOTES
"Drake Santiago is a 69 year old male who is being evaluated via a billable telephone visit.      The patient has been notified of following:     \"This telephone visit will be conducted via a call between you and your physician/provider. We have found that certain health care needs can be provided without the need for a physical exam.  This service lets us provide the care you need with a short phone conversation.  If a prescription is necessary we can send it directly to your pharmacy.  If lab work is needed we can place an order for that and you can then stop by our lab to have the test done at a later time.    Telephone visits are billed at different rates depending on your insurance coverage. During this emergency period, for some insurers they may be billed the same as an in-person visit.  Please reach out to your insurance provider with any questions.    If during the course of the call the physician/provider feels a telephone visit is not appropriate, you will not be charged for this service.\"    Patient has given verbal consent for Telephone visit?  Yes    How would you like to obtain your AVS? MyChart    Additional provider notes: see below    Phone call duration: 35 minutes    John Parker PA-C      Does Drake have a CPAP/Bipap?  No         Sleep Consultation:    Date on this visit: 4/17/2020    Drake Santiago is sent by Christiano Braun for a sleep consultation regarding snoring, sleep pnea.     Primary Physician: Christiano Braun     Drake Santiago reports frequent snoring and snorting for many years.      Drake goes to sleep at 10:00 PM during the week. He wakes up at 9:00 AM without an alarm. He falls asleep in 90 minutes.  Drake has difficulty falling asleep.  He wakes up 5-8 times a night for 10 minutes before falling back to sleep.  Drake wakes up to go to the bathroom. Patient gets an average of 7-8 hours of sleep per night.     Patient does use electronics in bed.     Drake does not do " shift work.      Drake does snore some nights. Patient does have a regular bed partner. There is report of gasping.  He does have witnessed apneas. They occasionally sleep separately.  Patient sleeps on his side. He has occasional sleep disturbance,. rDake has occasional sleep talking.    He denies sleep walking, sleep talking and sleep terrors as a child.  Drake has difficulty breathing through his and her nose.      Drake has gained 0-5 pounds in the last year.  Patient describes themself as a night person.  He would prefer to go to sleep at 10:00 PM and wake up at 8:30 AM.  Patient's Battiest Sleepiness score12/24 consistent with moderate daytime sleepiness.      Drake naps 1-2 times per week for  minutes, feels refreshed after naps. He takes no inadvertant naps.  He denies closing eyes, dozing and falling asleep while driving.   Patient was counseled on the importance of driving while alert, to pull over if drowsy, or nap before getting into the vehicle if sleepy.  He uses 2 cups/day of coffee, 3 sodas/day. Last caffeine intake is usually before bed.    Allergies:    Allergies   Allergen Reactions     Bees Anaphylaxis     Penicillins Anaphylaxis       Medications:    Current Outpatient Medications   Medication Sig Dispense Refill     atorvastatin (LIPITOR) 20 MG tablet Take 1 tablet (20 mg) by mouth daily 90 tablet 3     citalopram (CELEXA) 40 MG tablet TAKE ONE TABLET BY MOUTH ONCE DAILY 90 tablet 3     DIPHENHYDRAMINE HCL PO Take 50 mg by mouth nightly as needed       IBUPROFEN PO        sildenafil (VIAGRA) 50 MG tablet Take 1 tablet (50 mg) by mouth daily as needed 12 tablet 4     tamsulosin (FLOMAX) 0.4 MG capsule TAKE ONE CAPSULE BY MOUTH TWICE A  capsule 3       Problem List:  Patient Active Problem List    Diagnosis Date Noted     Major depressive disorder, recurrent episode, mild (H) 08/01/2018     Priority: Medium     ERRONEOUS ENCOUNTER--DISREGARD 09/27/2017     Priority: Medium     Benign  prostatic hyperplasia with lower urinary tract symptoms 04/21/2017     Priority: Medium     Hyperlipidemia LDL goal <130 04/21/2017     Priority: Medium        Past Medical/Surgical History:  Past Medical History:   Diagnosis Date     BPH (benign prostatic hyperplasia)      Hyperlipidemia      Past Surgical History:   Procedure Laterality Date     HERNIORRHAPHY INGUINAL       REPAIR LACERATION LID Right 6/5/2017    Procedure: REPAIR LACERATION LID;  right lower lid laceration repair, right canalicular laceration repair      ;  Surgeon: Al Ac MD;  Location:  OR       Social History:  Social History     Socioeconomic History     Marital status: Single     Spouse name: Not on file     Number of children: Not on file     Years of education: Not on file     Highest education level: Not on file   Occupational History     Not on file   Social Needs     Financial resource strain: Not on file     Food insecurity     Worry: Not on file     Inability: Not on file     Transportation needs     Medical: Not on file     Non-medical: Not on file   Tobacco Use     Smoking status: Former Smoker     Smokeless tobacco: Never Used   Substance and Sexual Activity     Alcohol use: Yes     Comment: glass of wine nightly      Drug use: No     Sexual activity: Not Currently     Partners: Female   Lifestyle     Physical activity     Days per week: Not on file     Minutes per session: Not on file     Stress: Not on file   Relationships     Social connections     Talks on phone: Not on file     Gets together: Not on file     Attends [employer] service: Not on file     Active member of club or organization: Not on file     Attends meetings of clubs or organizations: Not on file     Relationship status: Not on file     Intimate partner violence     Fear of current or ex partner: Not on file     Emotionally abused: Not on file     Physically abused: Not on file     Forced sexual activity: Not on file   Other Topics Concern     Not on  "file   Social History Narrative     Not on file       Family History:  Family History   Problem Relation Age of Onset     Glaucoma No family hx of      Macular Degeneration No family hx of          Physical Examination:  Vitals: Ht 1.778 m (5' 10\")   Wt 83.9 kg (185 lb)   BMI 26.54 kg/m    BMI= Body mass index is 26.54 kg/m .         [place] Total Score 4/17/2020   Total score - [place] 12       MARIAMA Total Score: 14 (04/17/20 1100)    Impression/Plan:  Possible sleep apnea- observed apneas. Will set up for HST when services resume.     Literature provided regarding sleep apnea.      He will follow up with me in approximately two weeks after his sleep study has been competed to review the results and discuss plan of care.       Polysomnography reviewed.  Obstructive sleep apnea reviewed.  Complications of untreated sleep apnea were reviewed.        CC: Christiano Braun        "

## 2020-06-05 ENCOUNTER — VIRTUAL VISIT (OUTPATIENT)
Dept: FAMILY MEDICINE | Facility: CLINIC | Age: 70
End: 2020-06-05
Payer: COMMERCIAL

## 2020-06-05 DIAGNOSIS — L23.7 CONTACT DERMATITIS DUE TO POISON IVY: Primary | ICD-10-CM

## 2020-06-05 PROCEDURE — 99213 OFFICE O/P EST LOW 20 MIN: CPT | Mod: 95 | Performed by: FAMILY MEDICINE

## 2020-06-05 RX ORDER — PREDNISONE 20 MG/1
TABLET ORAL
Qty: 21 TABLET | Refills: 0 | Status: SHIPPED | OUTPATIENT
Start: 2020-06-05 | End: 2020-06-19

## 2020-06-05 NOTE — PROGRESS NOTES
"Drake Santiago is a 69 year old male who is being evaluated via a billable video visit.      The patient has been notified of following:     \"This video visit will be conducted via a call between you and your physician/provider. We have found that certain health care needs can be provided without the need for an in-person physical exam.  This service lets us provide the care you need with a video conversation.  If a prescription is necessary we can send it directly to your pharmacy.  If lab work is needed we can place an order for that and you can then stop by our lab to have the test done at a later time.    Video visits are billed at different rates depending on your insurance coverage.  Please reach out to your insurance provider with any questions.    If during the course of the call the physician/provider feels a video visit is not appropriate, you will not be charged for this service.\"    Patient has given verbal consent for Video visit? Yes    How would you like to obtain your AVS? Rajendra    Patient would like the video invitation sent by: Send to e-mail at: [email or web address]    Will anyone else be joining your video visit? No      Subjective     Drake Santiago is a 69 year old male who presents today via video visit for the following health issues:    HPI  Drake Santiago is a 69 year old male who presents to the clinic today for a rash.  Onset of rash was 2 weeks ago.  Location of the rash: arm, lower, arm, upper, face and lower leg.  Associated symptoms include: blisters and itching.  Symptoms appear to be worsening.  Therapies tried to improve the rash: Benadryl.  Recent new medication? No  Previous history of a similar rash? Yes: Recurrent Poison Ivy  Recent exposure history: poison ivy    Patient Active Problem List   Diagnosis     Benign prostatic hyperplasia with lower urinary tract symptoms     Hyperlipidemia LDL goal <130     ERRONEOUS ENCOUNTER--DISREGARD     Major depressive " disorder, recurrent episode, mild (H)       Current Outpatient Medications   Medication Sig Dispense Refill     atorvastatin (LIPITOR) 20 MG tablet Take 1 tablet (20 mg) by mouth daily 90 tablet 3     citalopram (CELEXA) 40 MG tablet TAKE ONE TABLET BY MOUTH ONCE DAILY 90 tablet 3     DIPHENHYDRAMINE HCL PO Take 50 mg by mouth nightly as needed       IBUPROFEN PO        predniSONE (DELTASONE) 20 MG tablet Take 1 tablet (20 mg) by mouth 2 times daily for 7 days, THEN 1 tablet (20 mg) daily for 7 days. 21 tablet 0     sildenafil (VIAGRA) 50 MG tablet Take 1 tablet (50 mg) by mouth daily as needed 12 tablet 4     tamsulosin (FLOMAX) 0.4 MG capsule TAKE ONE CAPSULE BY MOUTH TWICE A  capsule 3       Rx: Signed Prescriptions:                        Disp   Refills    predniSONE (DELTASONE) 20 MG tablet        21 tab*0        Sig: Take 1 tablet (20 mg) by mouth 2 times daily for 7           days, THEN 1 tablet (20 mg) daily for 7 days.  Authorizing Provider: RICHARD DAILY             Video Start Time: 10:27 AM        Patient Active Problem List   Diagnosis     Benign prostatic hyperplasia with lower urinary tract symptoms     Hyperlipidemia LDL goal <130     ERRONEOUS ENCOUNTER--DISREGARD     Major depressive disorder, recurrent episode, mild (H)     Past Surgical History:   Procedure Laterality Date     HERNIORRHAPHY INGUINAL       REPAIR LACERATION LID Right 6/5/2017    Procedure: REPAIR LACERATION LID;  right lower lid laceration repair, right canalicular laceration repair      ;  Surgeon: Al Ac MD;  Location:  OR       Social History     Tobacco Use     Smoking status: Former Smoker     Smokeless tobacco: Never Used   Substance Use Topics     Alcohol use: Yes     Comment: glass of wine nightly      Family History   Problem Relation Age of Onset     Glaucoma No family hx of      Macular Degeneration No family hx of          Current Outpatient Medications   Medication Sig Dispense Refill      "atorvastatin (LIPITOR) 20 MG tablet Take 1 tablet (20 mg) by mouth daily 90 tablet 3     citalopram (CELEXA) 40 MG tablet TAKE ONE TABLET BY MOUTH ONCE DAILY 90 tablet 3     DIPHENHYDRAMINE HCL PO Take 50 mg by mouth nightly as needed       IBUPROFEN PO        sildenafil (VIAGRA) 50 MG tablet Take 1 tablet (50 mg) by mouth daily as needed 12 tablet 4     tamsulosin (FLOMAX) 0.4 MG capsule TAKE ONE CAPSULE BY MOUTH TWICE A  capsule 3     Allergies   Allergen Reactions     Bees Anaphylaxis     Penicillins Anaphylaxis     Recent Labs   Lab Test 01/10/20  0926 01/03/19  1000 07/25/18  1350 12/15/17  0751 04/24/17  0922   * 70  --   --  60   HDL 55 50  --   --  49   TRIG 95 84  --   --  103   ALT 21 26 25 35 26   CR 0.95 1.03 1.20 1.02 0.89   GFRESTIMATED 83 75 61 73 86   GFRESTBLACK >90 87 73 89 >90   GFR Calc     POTASSIUM 4.1 4.3 3.3* 3.9 3.9   TSH  --   --  0.64 1.30  --       BP Readings from Last 3 Encounters:   01/10/20 108/62   01/03/19 120/60   08/22/18 116/60    Wt Readings from Last 3 Encounters:   04/17/20 83.9 kg (185 lb)   01/10/20 88.5 kg (195 lb)   01/03/19 88.4 kg (194 lb 14.4 oz)                    Reviewed and updated as needed this visit by Provider         Review of Systems         Objective    There were no vitals taken for this visit.  Estimated body mass index is 26.54 kg/m  as calculated from the following:    Height as of 4/17/20: 1.778 m (5' 10\").    Weight as of 4/17/20: 83.9 kg (185 lb).  Physical Exam     GENERAL: Healthy, alert and no distress  EYES: Eyes grossly normal to inspection.  No discharge or erythema, or obvious scleral/conjunctival abnormalities.  RESP: No audible wheeze, cough, or visible cyanosis.  No visible retractions or increased work of breathing.    SKIN:   EXAM: This patient appears alert .  VITALS: There were no vitals taken for this visit.    Rash description:     Location: arm, lower, arm, upper and face     Distribution: contact areas    "  Lesion grouping: clustered and linear     Lesion type: vesicular     Color: red    Nail exam: normal- no clubbing or nail changes  Hair exam: normal    NEURO: Cranial nerves grossly intact.  Mentation and speech appropriate for age.  PSYCH: Mentation appears normal, affect normal/bright, judgement and insight intact, normal speech and appearance well-groomed.      Diagnostic Test Results:  Labs reviewed in Epic        Assessment & Plan     1. Contact dermatitis due to poison ivy  Assessment:  Contact dermatitis  Poison ivy    Plan:  Aveeno baths  Benadryl prn for itching.  Follow up in 2 weeks if there is no improvement.    - predniSONE (DELTASONE) 20 MG tablet; Take 1 tablet (20 mg) by mouth 2 times daily for 7 days, THEN 1 tablet (20 mg) daily for 7 days.  Dispense: 21 tablet; Refill: 0       Patient Instructions       Patient Education     Managing a Poison Ivy, Poison Oak, or Poison Sumac Reaction  If you come in contact with urushiol    If you think you may have come in contact with the sap oil (called urushiol) contained in poison ivy, poison oak, or poison sumac plants, wash the affected part of your skin. Do this within 15 minutes after contact. Use water or preferably, soap and water.  Undress, and wash your clothes and gear as soon as you can. Be sure to wash any pet that was with you. Taking these steps can help prevent spreading sap oil to someone else. If you have a rash, but are not sure if it is from one of these plants, see your healthcare provider.  To soothe the itching  Your skin may react to poison oak, poison ivy, and poison sumac within hours to a few days after contact. Once you have come into contact with these plants, you can t stop the reaction. But you can take these steps to soothe the itching:    Don t scratch or scrub your rash. Not even if the itching is severe. Scratching can lead to infection.    Bathe in lukewarm (not hot) water. Or take short cool showers to relieve the  itching. For a more soothing bath, add oatmeal to the water.    Use antihistamines that are taken by mouth. These include diphenhydramine. You can buy these at the pharmacy. Talk to your healthcare provider or pharmacist for more information on oral antihistamines.    Use over-the-counter treatments on your skin. These include cortisone and calamine lotion.  How your skin may react  A mild rash may become red, swollen, and itchy. The rash may form a line on your skin where you brushed against the plant. If you have a severe rash, your itching may worsen. And your rash may blister and ooze. If this happens, seek medical care. The fluid from your blisters will not make your rash spread. With or without medical care, your rash may last up to 3 weeks. In the future, try to avoid coming in contact with these plants.  When to call your healthcare provider  Call your healthcare provider if:    Your rash is severe    The rash spreads beyond the exposed part of your body or affects your face.    The rash does not clear up within a few weeks  You may be given medicine to take by mouth or apply directly on the skin.     Call 911  Call 911 if you have any of the following:    Trouble breathing or swallowing    Any significant swelling   Date Last Reviewed: 3/1/2017    6203-9418 [employer]. [address] [place] [address]. All rights reserved. This information is not intended as a substitute for professional medical care. Always follow your healthcare professional's instructions.           Patient Education     Avoiding Poison Ivy, Poison Oak, and Poison Sumac  Poison oak, poison ivy, and poison sumac are plants that can cause skin rashes. The problem is a sap oil called urushiol that is contained in these plants. If you're allergic to urushiol, touching one of these plants may cause your skin to react. Within hours or days, you may have a red, swollen, itchy rash. You can't stop the rash. But you can  soothe the itching.        Recognizing these plants  You can help prevent a poison oak, poison ivy, or poison sumac rash. Know what these plants look like. And then avoid them. They grow in the form of gamaliel, small plants, and large bushes. In most cases, poison oak and poison ivy have 3 leaves per stem. Poison sumac has from 7 leaves to 13 leaves per stem. Watch out for these plants when you go to any outdoor area, from a friend's overgrown back yard to the wilderness. Urushiol is present in these plants all year round, even when the leaves are gone. So always be on the lookout.  What causes a reaction?  Poison oak, poison ivy, and poison sumac thrive mainly in unmaintained outdoor areas. If you touch these plants, you may get a rash. You may also react if you touch something that came in contact with urushiol. This could be a dog or cat, clothing, or equipment. But the rash caused by these plants is not contagious.  Steps to prevention  When heading outdoors, take these preventive steps:    Avoid touching any of these plants.    Wear long pants and a long-sleeved shirt.    If you're going to a heavily wooded or brushy area, also put on gloves, a hat, and boots.    If you are very sensitive, apply bentoquatam 5% topical cream to all exposed areas of your skin. This creates a layer of protection between your skin and any sap oil you may touch.    If you come in contact with these plants or the oil, wash with soap and water as soon as possible.    Wash clothing and animals that come in contact with these plants as well. Urushiol may stay on them and cause a rash when you touch them in the future.  Date Last Reviewed: 3/1/2017    7711-1483 [employer]. [address] [place] [address], [place] [address]. All rights reserved. This information is not intended as a substitute for professional medical care. Always follow your healthcare professional's instructions.           Patient Education     Understanding Contact  Dermatitis     A cool, moist compress can help reduce itching.     Contact dermatitis is a common type of skin rash. It s caused by something that touches the skin and makes it irritated and inflamed. It can occur on skin on any part of the body, such as the face, neck, hands, arms, and legs. Contact dermatitis is not spread from person to person.  Often, the reaction of contact dermatitis occurs 1 to 2 days after contact with the offending agent.  How to say it  PRIMITIVO-tact [id number]   What causes contact dermatitis?  It s caused by something that irritates the skin, or that creates an allergic reaction on the skin. People can get contact dermatitis from many kinds of things. These include:    Plant oils in poison ivy, oak, and sumac    Chemicals in household , solvents, and glue    Chemicals in makeup, soap, laundry detergent, perfume, acne cream, and hair products    Certain medicines, such as neomycin, bacitracin, benzocaine, and thimerosal    Metals such as nickel, found in some jewelry and watch bands     The sticky material on the back of bandages and tape (adhesive)    Things that can cause tiny breaks in the skin, such as wood, fiberglass, metal tools, and plant thorns    Rubber latex in surgical gloves and other medical supplies  Dermatitis can also be caused by the skin being damp for long periods of time. This can happen from washing your hands too often, or working with wet materials.  Symptoms of contact dermatitis  Symptoms can include skin that is:    Blistered    Burning    Cracked    Dry    Itchy    Painful    Red    Rough, thickened, and leathery    Swollen    Warm  The blisters may ooze fluid and form crusts.  Treatment for contact dermatitis  Treatment is done to help relieve itching and reduce inflammation. The rash should go away in a few days to a few weeks. Treatments include:    Cool, moist compress. Use a clean damp cloth. Put it on the area for 20 to 30 minutes, 5 to 6 times a  day for the first 3 days.    Steroid cream or ointment. You can apply this medicine several times a day on clean skin.    Oral corticosteroid. Your healthcare provider may prescribe this medicine if you have severe skin symptoms on a large part of your body.  Your healthcare provider may give you a steroid injection instead of pills.    Oral antihistamine. This medicine can help reduce itching.    Colloidal oatmeal bath. Soaking in water with colloidal oatmeal can help soothe skin.    Plain cream, lotion, or ointment. Cream, lotion, or ointment without medicine can help to soothe and protect your skin.  Living with contact dermatitis  Talk with your healthcare provider about what may have caused your contact dermatitis. Patch testing may help you figure out what caused the rash so you can avoid further contact with it. Once you learn what caused your rash, make sure to avoid that substance. If your skin comes into contact with it again, make sure to wash your skin right away. If you can t avoid the substance, wear gloves or other protective clothing before you touch it. Or use a cream, lotion, or ointment to protect your skin.  When to call your healthcare provider  Call your healthcare provider right away if you have any of these:    Fever of 100.4 F (38 C) or higher, or as directed    Symptoms that don t get better, or get worse    New symptoms   Date Last Reviewed: 5/1/2016 2000-2019 [employer]. [address] [place] [address]. All rights reserved. This information is not intended as a substitute for professional medical care. Always follow your healthcare professional's instructions.               Return for If symptoms worsen or fail to improve.    Arsenio Oviedo MD  [facility]      Video-Visit Details    Type of service:  Video Visit    Video End Time:10:41 AM    Originating Location (pt. Location): Home    Distant Location (provider location):  [facility]  [place]     Platform used for Video Visit: AmWell    Return for If symptoms worsen or fail to improve.       Arsenoi Oviedo MD

## 2020-06-05 NOTE — PATIENT INSTRUCTIONS
Patient Education     Managing a Poison Ivy, Poison Oak, or Poison Sumac Reaction  If you come in contact with urushiol    If you think you may have come in contact with the sap oil (called urushiol) contained in poison ivy, poison oak, or poison sumac plants, wash the affected part of your skin. Do this within 15 minutes after contact. Use water or preferably, soap and water.  Undress, and wash your clothes and gear as soon as you can. Be sure to wash any pet that was with you. Taking these steps can help prevent spreading sap oil to someone else. If you have a rash, but are not sure if it is from one of these plants, see your healthcare provider.  To soothe the itching  Your skin may react to poison oak, poison ivy, and poison sumac within hours to a few days after contact. Once you have come into contact with these plants, you can t stop the reaction. But you can take these steps to soothe the itching:    Don t scratch or scrub your rash. Not even if the itching is severe. Scratching can lead to infection.    Bathe in lukewarm (not hot) water. Or take short cool showers to relieve the itching. For a more soothing bath, add oatmeal to the water.    Use antihistamines that are taken by mouth. These include diphenhydramine. You can buy these at the pharmacy. Talk to your healthcare provider or pharmacist for more information on oral antihistamines.    Use over-the-counter treatments on your skin. These include cortisone and calamine lotion.  How your skin may react  A mild rash may become red, swollen, and itchy. The rash may form a line on your skin where you brushed against the plant. If you have a severe rash, your itching may worsen. And your rash may blister and ooze. If this happens, seek medical care. The fluid from your blisters will not make your rash spread. With or without medical care, your rash may last up to 3 weeks. In the future, try to avoid coming in contact with these plants.  When to call your  healthcare provider  Call your healthcare provider if:    Your rash is severe    The rash spreads beyond the exposed part of your body or affects your face.    The rash does not clear up within a few weeks  You may be given medicine to take by mouth or apply directly on the skin.     Call 911  Call 911 if you have any of the following:    Trouble breathing or swallowing    Any significant swelling   Date Last Reviewed: 3/1/2017    8920-8401 [employer]. [address] [place] [address], [place] [address]. All rights reserved. This information is not intended as a substitute for professional medical care. Always follow your healthcare professional's instructions.           Patient Education     Avoiding Poison Ivy, Poison Oak, and Poison Sumac  Poison oak, poison ivy, and poison sumac are plants that can cause skin rashes. The problem is a sap oil called urushiol that is contained in these plants. If you're allergic to urushiol, touching one of these plants may cause your skin to react. Within hours or days, you may have a red, swollen, itchy rash. You can't stop the rash. But you can soothe the itching.        Recognizing these plants  You can help prevent a poison oak, poison ivy, or poison sumac rash. Know what these plants look like. And then avoid them. They grow in the form of gamaliel, small plants, and large bushes. In most cases, poison oak and poison ivy have 3 leaves per stem. Poison sumac has from 7 leaves to 13 leaves per stem. Watch out for these plants when you go to any outdoor area, from a friend's overgrown back yard to the wilderness. Urushiol is present in these plants all year round, even when the leaves are gone. So always be on the lookout.  What causes a reaction?  Poison oak, poison ivy, and poison sumac thrive mainly in unmaintained outdoor areas. If you touch these plants, you may get a rash. You may also react if you touch something that came in contact with urushiol. This could be a dog  or cat, clothing, or equipment. But the rash caused by these plants is not contagious.  Steps to prevention  When heading outdoors, take these preventive steps:    Avoid touching any of these plants.    Wear long pants and a long-sleeved shirt.    If you're going to a heavily wooded or brushy area, also put on gloves, a hat, and boots.    If you are very sensitive, apply bentoquatam 5% topical cream to all exposed areas of your skin. This creates a layer of protection between your skin and any sap oil you may touch.    If you come in contact with these plants or the oil, wash with soap and water as soon as possible.    Wash clothing and animals that come in contact with these plants as well. Urushiol may stay on them and cause a rash when you touch them in the future.  Date Last Reviewed: 3/1/2017    8199-3081 [employer]. [address] [place] [address]. All rights reserved. This information is not intended as a substitute for professional medical care. Always follow your healthcare professional's instructions.           Patient Education     Understanding Contact Dermatitis     A cool, moist compress can help reduce itching.     Contact dermatitis is a common type of skin rash. It s caused by something that touches the skin and makes it irritated and inflamed. It can occur on skin on any part of the body, such as the face, neck, hands, arms, and legs. Contact dermatitis is not spread from person to person.  Often, the reaction of contact dermatitis occurs 1 to 2 days after contact with the offending agent.  How to say it  PRIMITIVO-tact [id number]   What causes contact dermatitis?  It s caused by something that irritates the skin, or that creates an allergic reaction on the skin. People can get contact dermatitis from many kinds of things. These include:    Plant oils in poison ivy, oak, and sumac    Chemicals in household , solvents, and glue    Chemicals in makeup, soap, laundry  detergent, perfume, acne cream, and hair products    Certain medicines, such as neomycin, bacitracin, benzocaine, and thimerosal    Metals such as nickel, found in some jewelry and watch bands     The sticky material on the back of bandages and tape (adhesive)    Things that can cause tiny breaks in the skin, such as wood, fiberglass, metal tools, and plant thorns    Rubber latex in surgical gloves and other medical supplies  Dermatitis can also be caused by the skin being damp for long periods of time. This can happen from washing your hands too often, or working with wet materials.  Symptoms of contact dermatitis  Symptoms can include skin that is:    Blistered    Burning    Cracked    Dry    Itchy    Painful    Red    Rough, thickened, and leathery    Swollen    Warm  The blisters may ooze fluid and form crusts.  Treatment for contact dermatitis  Treatment is done to help relieve itching and reduce inflammation. The rash should go away in a few days to a few weeks. Treatments include:    Cool, moist compress. Use a clean damp cloth. Put it on the area for 20 to 30 minutes, 5 to 6 times a day for the first 3 days.    Steroid cream or ointment. You can apply this medicine several times a day on clean skin.    Oral corticosteroid. Your healthcare provider may prescribe this medicine if you have severe skin symptoms on a large part of your body.  Your healthcare provider may give you a steroid injection instead of pills.    Oral antihistamine. This medicine can help reduce itching.    Colloidal oatmeal bath. Soaking in water with colloidal oatmeal can help soothe skin.    Plain cream, lotion, or ointment. Cream, lotion, or ointment without medicine can help to soothe and protect your skin.  Living with contact dermatitis  Talk with your healthcare provider about what may have caused your contact dermatitis. Patch testing may help you figure out what caused the rash so you can avoid further contact with it. Once you  learn what caused your rash, make sure to avoid that substance. If your skin comes into contact with it again, make sure to wash your skin right away. If you can t avoid the substance, wear gloves or other protective clothing before you touch it. Or use a cream, lotion, or ointment to protect your skin.  When to call your healthcare provider  Call your healthcare provider right away if you have any of these:    Fever of 100.4 F (38 C) or higher, or as directed    Symptoms that don t get better, or get worse    New symptoms   Date Last Reviewed: 5/1/2016 2000-2019 [employer]. [address] [place] [address], [place] [address]. All rights reserved. This information is not intended as a substitute for professional medical care. Always follow your healthcare professional's instructions.

## 2020-12-27 ENCOUNTER — HEALTH MAINTENANCE LETTER (OUTPATIENT)
Age: 70
End: 2020-12-27

## 2021-02-23 DIAGNOSIS — E78.5 HYPERLIPIDEMIA LDL GOAL <130: ICD-10-CM

## 2021-02-23 DIAGNOSIS — F33.0 MAJOR DEPRESSIVE DISORDER, RECURRENT EPISODE, MILD (H): ICD-10-CM

## 2021-02-23 DIAGNOSIS — N40.1 BENIGN PROSTATIC HYPERPLASIA WITH LOWER URINARY TRACT SYMPTOMS, SYMPTOM DETAILS UNSPECIFIED: ICD-10-CM

## 2021-02-24 RX ORDER — CITALOPRAM HYDROBROMIDE 40 MG/1
TABLET ORAL
Qty: 30 TABLET | Refills: 0 | Status: SHIPPED | OUTPATIENT
Start: 2021-02-24 | End: 2021-03-15

## 2021-02-24 RX ORDER — ATORVASTATIN CALCIUM 20 MG/1
TABLET, FILM COATED ORAL
Qty: 30 TABLET | Refills: 0 | Status: SHIPPED | OUTPATIENT
Start: 2021-02-24 | End: 2021-03-15

## 2021-02-24 RX ORDER — TAMSULOSIN HYDROCHLORIDE 0.4 MG/1
CAPSULE ORAL
Qty: 60 CAPSULE | Refills: 0 | Status: SHIPPED | OUTPATIENT
Start: 2021-02-24 | End: 2021-03-15

## 2021-02-24 NOTE — TELEPHONE ENCOUNTER
* FLOMAX 0.4 mg  Last Written Prescription Date:  11/25/20  Last Fill Quantity: 180 Take one twice a day # refills: 0   Last office visit: 1/10/2020 with prescribing provider:  Dr. Braun   Future Office Visit:  NONE    * CELEXA  Last Written Prescription Date:  11/25/20,  Last Fill Quantity: 90  # refills: 0   Last office visit: 1/10/2020 with prescribing provider:  Dr. Braun   Future Office Visit:  none    Lipitor Prescription approved per [facility] Refill Protocol. PT is due for office visit. Notified per comment section of Rx..............ESDRAS Alcantara    * Forwarding Celexa and Flomax to Primary Care Provider as out of RN scope of practice when both list mod to HIGH RISK side effects when used with Advil and Viagra.....   ESDRAS Alcantara.

## 2021-03-10 ENCOUNTER — IMMUNIZATION (OUTPATIENT)
Dept: FAMILY MEDICINE | Facility: CLINIC | Age: 71
End: 2021-03-10
Payer: COMMERCIAL

## 2021-03-10 PROCEDURE — 91301 PR COVID VAC MODERNA 100 MCG/0.5 ML IM: CPT

## 2021-03-10 PROCEDURE — 0011A PR COVID VAC MODERNA 100 MCG/0.5 ML IM: CPT

## 2021-03-15 ENCOUNTER — OFFICE VISIT (OUTPATIENT)
Dept: FAMILY MEDICINE | Facility: CLINIC | Age: 71
End: 2021-03-15
Payer: COMMERCIAL

## 2021-03-15 VITALS
DIASTOLIC BLOOD PRESSURE: 76 MMHG | OXYGEN SATURATION: 97 % | TEMPERATURE: 97.7 F | SYSTOLIC BLOOD PRESSURE: 126 MMHG | RESPIRATION RATE: 16 BRPM | WEIGHT: 204 LBS | HEIGHT: 70 IN | BODY MASS INDEX: 29.2 KG/M2 | HEART RATE: 68 BPM

## 2021-03-15 DIAGNOSIS — N52.9 ERECTILE DYSFUNCTION, UNSPECIFIED ERECTILE DYSFUNCTION TYPE: ICD-10-CM

## 2021-03-15 DIAGNOSIS — F33.0 MAJOR DEPRESSIVE DISORDER, RECURRENT EPISODE, MILD (H): ICD-10-CM

## 2021-03-15 DIAGNOSIS — E78.5 HYPERLIPIDEMIA LDL GOAL <130: Primary | ICD-10-CM

## 2021-03-15 DIAGNOSIS — Z12.5 SCREENING FOR PROSTATE CANCER: ICD-10-CM

## 2021-03-15 DIAGNOSIS — N40.1 BENIGN PROSTATIC HYPERPLASIA WITH LOWER URINARY TRACT SYMPTOMS, SYMPTOM DETAILS UNSPECIFIED: ICD-10-CM

## 2021-03-15 LAB
ALBUMIN SERPL-MCNC: 3.7 G/DL (ref 3.4–5)
ALP SERPL-CCNC: 95 U/L (ref 40–150)
ALT SERPL W P-5'-P-CCNC: 32 U/L (ref 0–70)
ANION GAP SERPL CALCULATED.3IONS-SCNC: 3 MMOL/L (ref 3–14)
AST SERPL W P-5'-P-CCNC: <3 U/L (ref 0–45)
BILIRUB SERPL-MCNC: 0.7 MG/DL (ref 0.2–1.3)
BUN SERPL-MCNC: 30 MG/DL (ref 7–30)
CALCIUM SERPL-MCNC: 8.8 MG/DL (ref 8.5–10.1)
CHLORIDE SERPL-SCNC: 106 MMOL/L (ref 94–109)
CHOLEST SERPL-MCNC: 183 MG/DL
CO2 SERPL-SCNC: 32 MMOL/L (ref 20–32)
CREAT SERPL-MCNC: 0.96 MG/DL (ref 0.66–1.25)
GFR SERPL CREATININE-BSD FRML MDRD: 80 ML/MIN/{1.73_M2}
GLUCOSE SERPL-MCNC: 95 MG/DL (ref 70–99)
HDLC SERPL-MCNC: 53 MG/DL
LDLC SERPL CALC-MCNC: 105 MG/DL
NONHDLC SERPL-MCNC: 130 MG/DL
POTASSIUM SERPL-SCNC: 4.2 MMOL/L (ref 3.4–5.3)
PROT SERPL-MCNC: 8.4 G/DL (ref 6.8–8.8)
PSA SERPL-ACNC: 0.96 UG/L (ref 0–4)
SODIUM SERPL-SCNC: 141 MMOL/L (ref 133–144)
TRIGL SERPL-MCNC: 125 MG/DL

## 2021-03-15 PROCEDURE — G0103 PSA SCREENING: HCPCS | Performed by: FAMILY MEDICINE

## 2021-03-15 PROCEDURE — 80061 LIPID PANEL: CPT | Performed by: FAMILY MEDICINE

## 2021-03-15 PROCEDURE — 36415 COLL VENOUS BLD VENIPUNCTURE: CPT | Performed by: FAMILY MEDICINE

## 2021-03-15 PROCEDURE — 99214 OFFICE O/P EST MOD 30 MIN: CPT | Performed by: FAMILY MEDICINE

## 2021-03-15 PROCEDURE — 80053 COMPREHEN METABOLIC PANEL: CPT | Performed by: FAMILY MEDICINE

## 2021-03-15 RX ORDER — SILDENAFIL CITRATE 20 MG/1
60 TABLET ORAL DAILY PRN
Qty: 30 TABLET | Refills: 4 | Status: SHIPPED | OUTPATIENT
Start: 2021-03-15 | End: 2021-09-20

## 2021-03-15 RX ORDER — TAMSULOSIN HYDROCHLORIDE 0.4 MG/1
0.4 CAPSULE ORAL 2 TIMES DAILY
Qty: 60 CAPSULE | Refills: 11 | Status: SHIPPED | OUTPATIENT
Start: 2021-03-15 | End: 2022-03-23

## 2021-03-15 RX ORDER — SILDENAFIL 50 MG/1
50 TABLET, FILM COATED ORAL DAILY PRN
Qty: 12 TABLET | Refills: 4 | Status: CANCELLED | OUTPATIENT
Start: 2021-03-15

## 2021-03-15 RX ORDER — ATORVASTATIN CALCIUM 20 MG/1
20 TABLET, FILM COATED ORAL DAILY
Qty: 30 TABLET | Refills: 11 | Status: SHIPPED | OUTPATIENT
Start: 2021-03-15 | End: 2022-03-23

## 2021-03-15 RX ORDER — CITALOPRAM HYDROBROMIDE 40 MG/1
40 TABLET ORAL DAILY
Qty: 30 TABLET | Refills: 11 | Status: SHIPPED | OUTPATIENT
Start: 2021-03-15 | End: 2022-03-23

## 2021-03-15 ASSESSMENT — ANXIETY QUESTIONNAIRES
7. FEELING AFRAID AS IF SOMETHING AWFUL MIGHT HAPPEN: NOT AT ALL
3. WORRYING TOO MUCH ABOUT DIFFERENT THINGS: NOT AT ALL
GAD7 TOTAL SCORE: 1
6. BECOMING EASILY ANNOYED OR IRRITABLE: NOT AT ALL
5. BEING SO RESTLESS THAT IT IS HARD TO SIT STILL: NOT AT ALL
1. FEELING NERVOUS, ANXIOUS, OR ON EDGE: NOT AT ALL
2. NOT BEING ABLE TO STOP OR CONTROL WORRYING: NOT AT ALL
IF YOU CHECKED OFF ANY PROBLEMS ON THIS QUESTIONNAIRE, HOW DIFFICULT HAVE THESE PROBLEMS MADE IT FOR YOU TO DO YOUR WORK, TAKE CARE OF THINGS AT HOME, OR GET ALONG WITH OTHER PEOPLE: NOT DIFFICULT AT ALL

## 2021-03-15 ASSESSMENT — MIFFLIN-ST. JEOR: SCORE: 1701.59

## 2021-03-15 ASSESSMENT — PATIENT HEALTH QUESTIONNAIRE - PHQ9
5. POOR APPETITE OR OVEREATING: SEVERAL DAYS
SUM OF ALL RESPONSES TO PHQ QUESTIONS 1-9: 3

## 2021-03-15 ASSESSMENT — PAIN SCALES - GENERAL: PAINLEVEL: NO PAIN (0)

## 2021-03-15 NOTE — PROGRESS NOTES
"    Assessment & Plan     Hyperlipidemia LDL goal <130  Doing fine on the Lipitor this was renewed for a year.  We will get lab work on him he is fasting today we will notify him with the results.  - atorvastatin (LIPITOR) 20 MG tablet; Take 1 tablet (20 mg) by mouth daily  - Lipid panel reflex to direct LDL Fasting  - Comprehensive metabolic panel    Major depressive disorder, recurrent episode, mild (H)  Doing very well renewed Celexa for a year.  PHQ-9 and BRAN-7 are fine.  - citalopram (CELEXA) 40 MG tablet; Take 1 tablet (40 mg) by mouth daily    Erectile dysfunction, unspecified erectile dysfunction type  He went to get 1250 mg Viagra last year when I prescribed it they wanted over $500 so he refused it.  We will try this it should be more economical.  - sildenafil (REVATIO) 20 MG tablet; Take 3 tablets (60 mg) by mouth daily as needed    Benign prostatic hyperplasia with lower urinary tract symptoms, symptom details unspecified  He takes this.  States he would not get along without it.  - tamsulosin (FLOMAX) 0.4 MG capsule; Take 1 capsule (0.4 mg) by mouth 2 times daily    Screening for prostate cancer  We will notify with results.  - PSA, screen             BMI:   Estimated body mass index is 29.27 kg/m  as calculated from the following:    Height as of this encounter: 1.778 m (5' 10\").    Weight as of this encounter: 92.5 kg (204 lb).   Weight management plan: Discussed healthy diet and exercise guidelines        No follow-ups on file.    Christiano Braun MD  [facility] is a 70 year old who presents for the following health issues: Follow-up of his hyperlipidemia depression erectile dysfunction and urinary frequency.  His medications are doing very well from having no trouble with any of the medicines.  He is due for some lab work and is fasting today.  He is feeling very well.  No concerns.    HPI     Hyperlipidemia Follow-Up      Are you regularly taking any " medication or supplement to lower your cholesterol?   Yes- atorvastatin    Are you having muscle aches or other side effects that you think could be caused by your cholesterol lowering medication?  No    Depression Followup    How are you doing with your depression since your last visit? Worsened - winter    Are you having other symptoms that might be associated with depression? No    Have you had a significant life event?  Grief or Loss 2nd wife passed away    Are you feeling anxious or having panic attacks?   No    Do you have any concerns with your use of alcohol or other drugs? No    Social History     Tobacco Use     Smoking status: Former Smoker     Smokeless tobacco: Never Used   Substance Use Topics     Alcohol use: Yes     Comment: glass of wine nightly      Drug use: No     PHQ 1/3/2019 9/25/2019 3/15/2021   PHQ-9 Total Score 3 0 3   Q9: Thoughts of better off dead/self-harm past 2 weeks Not at all Not at all Not at all   F/U: Thoughts of suicide or self-harm - - -   F/U: Safety concerns - - -     BRAN-7 SCORE 8/1/2018 3/15/2021   Total Score 16 1     Last PHQ-9 3/15/2021   1.  Little interest or pleasure in doing things 1   2.  Feeling down, depressed, or hopeless 0   3.  Trouble falling or staying asleep, or sleeping too much 1   4.  Feeling tired or having little energy 1   5.  Poor appetite or overeating 0   6.  Feeling bad about yourself 0   7.  Trouble concentrating 0   8.  Moving slowly or restless 0   Q9: Thoughts of better off dead/self-harm past 2 weeks 0   PHQ-9 Total Score 3   Difficulty at work, home, or with people Not difficult at all   In the past two weeks have you had thoughts of suicide or self harm? -   Do you have concerns about your personal safety or the safety of others? -     BRAN-7  3/15/2021   1. Feeling nervous, anxious, or on edge 0   2. Not being able to stop or control worrying 0   3. Worrying too much about different things 0   4. Trouble relaxing 1   5. Being so restless that  "it is hard to sit still 0   6. Becoming easily annoyed or irritable 0   7. Feeling afraid, as if something awful might happen 0   BRAN-7 Total Score 1   If you checked any problems, how difficult have they made it for you to do your work, take care of things at home, or get along with other people? Not difficult at all       Suicide Assessment Five-step Evaluation and Treatment (SAFE-T)      How many servings of fruits and vegetables do you eat daily?  0-1    On average, how many sweetened beverages do you drink each day (Examples: soda, juice, sweet tea, etc.  Do NOT count diet or artificially sweetened beverages)?   3    How many days per week do you exercise enough to make your heart beat faster? 0    How many minutes a day do you exercise enough to make your heart beat faster? 0    How many days per week do you miss taking your medication? 0        Review of Systems   Constitutional, HEENT, cardiovascular, pulmonary, gi and gu systems are negative, except as otherwise noted.      Objective    /76   Pulse 68   Temp 97.7  F (36.5  C) (Temporal)   Resp 16   Ht 1.778 m (5' 10\")   Wt 92.5 kg (204 lb)   SpO2 97%   BMI 29.27 kg/m    Body mass index is 29.27 kg/m .  Physical Exam   GENERAL: healthy, alert and no distress  EYES: Eyes grossly normal to inspection, PERRL and conjunctivae and sclerae normal  NECK: no adenopathy, no asymmetry, masses, or scars and thyroid normal to palpation  RESP: lungs clear to auscultation - no rales, rhonchi or wheezes  CV: regular rate and rhythm, normal S1 S2, no S3 or S4, no murmur, click or rub, no peripheral edema and peripheral pulses strong  MS: no gross musculoskeletal defects noted, no edema  NEURO: Normal strength and tone, mentation intact and speech normal  PSYCH: mentation appears normal, affect normal/bright, appearance well groomed and BRAN-7 score is 1 PHQ-9 score is 3                  "

## 2021-03-16 ASSESSMENT — ANXIETY QUESTIONNAIRES: GAD7 TOTAL SCORE: 1

## 2021-04-07 ENCOUNTER — IMMUNIZATION (OUTPATIENT)
Dept: FAMILY MEDICINE | Facility: CLINIC | Age: 71
End: 2021-04-07
Attending: FAMILY MEDICINE
Payer: COMMERCIAL

## 2021-04-07 PROCEDURE — 91301 PR COVID VAC MODERNA 100 MCG/0.5 ML IM: CPT

## 2021-04-07 PROCEDURE — 0012A PR COVID VAC MODERNA 100 MCG/0.5 ML IM: CPT

## 2021-04-24 ENCOUNTER — HEALTH MAINTENANCE LETTER (OUTPATIENT)
Age: 71
End: 2021-04-24

## 2021-09-17 DIAGNOSIS — N52.9 ERECTILE DYSFUNCTION, UNSPECIFIED ERECTILE DYSFUNCTION TYPE: ICD-10-CM

## 2021-09-20 RX ORDER — SILDENAFIL CITRATE 20 MG/1
60 TABLET ORAL DAILY PRN
Qty: 30 TABLET | Refills: 4 | Status: SHIPPED | OUTPATIENT
Start: 2021-09-20 | End: 2023-10-03

## 2021-09-20 NOTE — TELEPHONE ENCOUNTER
Routing refill request to provider for review/approval because:  Alpha Blockers on Med list, needs provider approval    ROYAL MoraN, RN  [facility]

## 2021-10-09 ENCOUNTER — HEALTH MAINTENANCE LETTER (OUTPATIENT)
Age: 71
End: 2021-10-09

## 2022-03-22 DIAGNOSIS — N40.1 BENIGN PROSTATIC HYPERPLASIA WITH LOWER URINARY TRACT SYMPTOMS, SYMPTOM DETAILS UNSPECIFIED: ICD-10-CM

## 2022-03-22 DIAGNOSIS — E78.5 HYPERLIPIDEMIA LDL GOAL <130: ICD-10-CM

## 2022-03-22 DIAGNOSIS — F33.0 MAJOR DEPRESSIVE DISORDER, RECURRENT EPISODE, MILD (H): ICD-10-CM

## 2022-03-23 RX ORDER — TAMSULOSIN HYDROCHLORIDE 0.4 MG/1
CAPSULE ORAL
Qty: 60 CAPSULE | Refills: 11 | Status: SHIPPED | OUTPATIENT
Start: 2022-03-23 | End: 2023-03-10

## 2022-03-23 RX ORDER — CITALOPRAM HYDROBROMIDE 40 MG/1
TABLET ORAL
Qty: 30 TABLET | Refills: 11 | Status: SHIPPED | OUTPATIENT
Start: 2022-03-23 | End: 2023-03-10

## 2022-03-23 RX ORDER — ATORVASTATIN CALCIUM 20 MG/1
TABLET, FILM COATED ORAL
Qty: 30 TABLET | Refills: 11 | Status: SHIPPED | OUTPATIENT
Start: 2022-03-23 | End: 2023-03-10

## 2022-03-23 NOTE — TELEPHONE ENCOUNTER
flomax  celxa  lipitor  Routing refill request to provider for review/approval because:  Labs not current:  LDL  PHQ9 and BP failed RN protocol    Sending to scheduling for yearly office visit due    Meri Goyal RN

## 2022-04-25 ENCOUNTER — OFFICE VISIT (OUTPATIENT)
Dept: FAMILY MEDICINE | Facility: CLINIC | Age: 72
End: 2022-04-25
Payer: COMMERCIAL

## 2022-04-25 VITALS
WEIGHT: 191.44 LBS | HEIGHT: 70 IN | DIASTOLIC BLOOD PRESSURE: 68 MMHG | SYSTOLIC BLOOD PRESSURE: 118 MMHG | BODY MASS INDEX: 27.41 KG/M2 | OXYGEN SATURATION: 100 % | HEART RATE: 70 BPM | RESPIRATION RATE: 16 BRPM | TEMPERATURE: 97.3 F

## 2022-04-25 DIAGNOSIS — Z00.00 ENCOUNTER FOR MEDICARE ANNUAL WELLNESS EXAM: Primary | ICD-10-CM

## 2022-04-25 DIAGNOSIS — N40.1 BENIGN PROSTATIC HYPERPLASIA WITH URINARY FREQUENCY: ICD-10-CM

## 2022-04-25 DIAGNOSIS — R35.0 BENIGN PROSTATIC HYPERPLASIA WITH URINARY FREQUENCY: ICD-10-CM

## 2022-04-25 DIAGNOSIS — E78.5 HYPERLIPIDEMIA LDL GOAL <130: ICD-10-CM

## 2022-04-25 LAB
ALBUMIN SERPL-MCNC: 4.1 G/DL (ref 3.4–5)
ALP SERPL-CCNC: 91 U/L (ref 40–150)
ALT SERPL W P-5'-P-CCNC: 24 U/L (ref 0–70)
ANION GAP SERPL CALCULATED.3IONS-SCNC: 4 MMOL/L (ref 3–14)
AST SERPL W P-5'-P-CCNC: 15 U/L (ref 0–45)
BILIRUB SERPL-MCNC: 0.7 MG/DL (ref 0.2–1.3)
BUN SERPL-MCNC: 16 MG/DL (ref 7–30)
CALCIUM SERPL-MCNC: 8.7 MG/DL (ref 8.5–10.1)
CHLORIDE BLD-SCNC: 104 MMOL/L (ref 94–109)
CHOLEST SERPL-MCNC: 165 MG/DL
CO2 SERPL-SCNC: 31 MMOL/L (ref 20–32)
CREAT SERPL-MCNC: 1.01 MG/DL (ref 0.66–1.25)
FASTING STATUS PATIENT QL REPORTED: YES
GFR SERPL CREATININE-BSD FRML MDRD: 81 ML/MIN/1.73M2
GLUCOSE BLD-MCNC: 106 MG/DL (ref 70–99)
HDLC SERPL-MCNC: 54 MG/DL
LDLC SERPL CALC-MCNC: 91 MG/DL
NONHDLC SERPL-MCNC: 111 MG/DL
POTASSIUM BLD-SCNC: 3.6 MMOL/L (ref 3.4–5.3)
PROT SERPL-MCNC: 7.5 G/DL (ref 6.8–8.8)
PSA SERPL-MCNC: 1.18 UG/L (ref 0–4)
SODIUM SERPL-SCNC: 139 MMOL/L (ref 133–144)
TRIGL SERPL-MCNC: 101 MG/DL

## 2022-04-25 PROCEDURE — 99397 PER PM REEVAL EST PAT 65+ YR: CPT | Performed by: FAMILY MEDICINE

## 2022-04-25 PROCEDURE — 84153 ASSAY OF PSA TOTAL: CPT | Performed by: FAMILY MEDICINE

## 2022-04-25 PROCEDURE — 36415 COLL VENOUS BLD VENIPUNCTURE: CPT | Performed by: FAMILY MEDICINE

## 2022-04-25 PROCEDURE — 80053 COMPREHEN METABOLIC PANEL: CPT | Performed by: FAMILY MEDICINE

## 2022-04-25 PROCEDURE — 80061 LIPID PANEL: CPT | Performed by: FAMILY MEDICINE

## 2022-04-25 ASSESSMENT — ACTIVITIES OF DAILY LIVING (ADL): CURRENT_FUNCTION: NO ASSISTANCE NEEDED

## 2022-04-25 ASSESSMENT — ANXIETY QUESTIONNAIRES
5. BEING SO RESTLESS THAT IT IS HARD TO SIT STILL: SEVERAL DAYS
IF YOU CHECKED OFF ANY PROBLEMS ON THIS QUESTIONNAIRE, HOW DIFFICULT HAVE THESE PROBLEMS MADE IT FOR YOU TO DO YOUR WORK, TAKE CARE OF THINGS AT HOME, OR GET ALONG WITH OTHER PEOPLE: SOMEWHAT DIFFICULT
2. NOT BEING ABLE TO STOP OR CONTROL WORRYING: SEVERAL DAYS
GAD7 TOTAL SCORE: 9
6. BECOMING EASILY ANNOYED OR IRRITABLE: MORE THAN HALF THE DAYS
3. WORRYING TOO MUCH ABOUT DIFFERENT THINGS: SEVERAL DAYS
7. FEELING AFRAID AS IF SOMETHING AWFUL MIGHT HAPPEN: NOT AT ALL
1. FEELING NERVOUS, ANXIOUS, OR ON EDGE: SEVERAL DAYS

## 2022-04-25 ASSESSMENT — PATIENT HEALTH QUESTIONNAIRE - PHQ9
SUM OF ALL RESPONSES TO PHQ QUESTIONS 1-9: 8
5. POOR APPETITE OR OVEREATING: NEARLY EVERY DAY

## 2022-04-25 NOTE — PROGRESS NOTES
"SUBJECTIVE:   Drake Santiago is a 71 year old male who presents for Preventive Visit.      Patient has been advised of split billing requirements and indicates understanding: Yes  Are you in the first 12 months of your Medicare coverage?  No    Healthy Habits:     In general, how would you rate your overall health?  Very good    Frequency of exercise:  4-5 days/week    Duration of exercise:  30-45 minutes    Do you usually eat at least 4 servings of fruit and vegetables a day, include whole grains    & fiber and avoid regularly eating high fat or \"junk\" foods?  Yes (Juice plus capsules)    Taking medications regularly:  Yes    Barriers to taking medications:  None    Medication side effects:  None    Ability to successfully perform activities of daily living:  No assistance needed    Home Safety:  No safety concerns identified    Hearing Impairment:  Difficulty following a conversation in a noisy restaurant or crowded room, feel that people are mumbling or not speaking clearly, need to ask people to speak up or repeat themselves and difficulty understanding soft or whispered speech    In the past 6 months, have you been bothered by leaking of urine? Yes    In general, how would you rate your overall mental or emotional health?  Very good      PHQ-2 Total Score: 2    Additional concerns today:  Yes (bilateral knee pain, joint pain hands)    Do you feel safe in your environment? Yes    Have you ever done Advance Care Planning? (For example, a Health Directive, POLST, or a discussion with a medical provider or your loved ones about your wishes): No, advance care planning information given to patient to review.  Patient plans to discuss their wishes with loved ones or provider.         Fall risk  Fallen 2 or more times in the past year?: No  Any fall with injury in the past year?: No    Cognitive Screening   1) Repeat 3 items (Leader, Season, Table)    2) Clock draw: NORMAL  3) 3 item recall: Recalls 2 objects "   Results: NORMAL clock, 1-2 items recalled: COGNITIVE IMPAIRMENT LESS LIKELY    Mini-CogTM Copyright ANUP Erwin. Licensed by the author for use in [facility]; reprinted with permission (tk@[facility]). All rights reserved.      Do you have sleep apnea, excessive snoring or daytime drowsiness?: yes  Possible sleep apnea    Reviewed and updated as needed this visit by clinical staff                    Reviewed and updated as needed this visit by Provider                   Social History     Tobacco Use     Smoking status: Former Smoker     Smokeless tobacco: Never Used   Substance Use Topics     Alcohol use: Yes     Comment: glass of wine nightly          Alcohol Use 4/21/2017   Prescreen: >3 drinks/day or >7 drinks/week? The patient does not drink >3 drinks per day nor >7 drinks per week.               Current providers sharing in care for this patient include:   Patient Care Team:  Christiano Braun MD as PCP - General (Family Practice)  Christiano Braun MD as Assigned PCP    The following health maintenance items are reviewed in Epic and correct as of today:  Health Maintenance Due   Topic Date Due     ANNUAL REVIEW OF HM ORDERS  Never done     ADVANCE CARE PLANNING  Never done     HEPATITIS C SCREENING  Never done     ZOSTER IMMUNIZATION (1 of 2) Never done     LUNG CANCER SCREENING  Never done     Pneumococcal Vaccine: 65+ Years (1 of 1 - PPSV23) Never done     AORTIC ANEURYSM SCREENING (SYSTEM ASSIGNED)  Never done     MEDICARE ANNUAL WELLNESS VISIT  04/21/2018     INFLUENZA VACCINE (1) 09/01/2021     COVID-19 Vaccine (3 - Booster for Moderna series) 09/07/2021     PHQ-9  09/15/2021     FALL RISK ASSESSMENT  03/15/2022     Labs reviewed in EPIC          Review of Systems  Constitutional, HEENT, cardiovascular, pulmonary, GI, , musculoskeletal, neuro, skin, endocrine and psych systems are negative, except as otherwise noted.    OBJECTIVE:   /68 (BP Location: Right arm, Patient Position:  "Sitting, Cuff Size: Adult Regular)   Pulse 70   Temp 97.3  F (36.3  C) (Temporal)   Resp 16   Ht 1.778 m (5' 10\")   Wt 86.8 kg (191 lb 7 oz)   SpO2 100%   BMI 27.47 kg/m   Estimated body mass index is 29.27 kg/m  as calculated from the following:    Height as of 3/15/21: 1.778 m (5' 10\").    Weight as of 3/15/21: 92.5 kg (204 lb).  Physical Exam  GENERAL: healthy, alert and no distress  EYES: Eyes grossly normal to inspection, PERRL and conjunctivae and sclerae normal  HENT: ear canals and TM's normal, nose and mouth without ulcers or lesions  NECK: no adenopathy, no asymmetry, masses, or scars and thyroid normal to palpation  RESP: lungs clear to auscultation - no rales, rhonchi or wheezes  CV: regular rate and rhythm, normal S1 S2, no S3 or S4, no murmur, click or rub, no peripheral edema and peripheral pulses strong  ABDOMEN: soft, nontender, no hepatosplenomegaly, no masses and bowel sounds normal  MS: no gross musculoskeletal defects noted, no edema  SKIN: no suspicious lesions or rashes  NEURO: Normal strength and tone, mentation intact and speech normal  PSYCH: mentation appears normal, affect normal/bright    Diagnostic Test Results:  Labs reviewed in Epic  Results for orders placed or performed in visit on 04/25/22 (from the past 24 hour(s))   Lipid panel reflex to direct LDL Fasting   Result Value Ref Range    Cholesterol 165 <200 mg/dL    Triglycerides 101 <150 mg/dL    Direct Measure HDL 54 >=40 mg/dL    LDL Cholesterol Calculated 91 <=100 mg/dL    Non HDL Cholesterol 111 <130 mg/dL    Patient Fasting > 8hrs? Yes     Narrative    Cholesterol  Desirable:  <200 mg/dL    Triglycerides  Normal:  Less than 150 mg/dL  Borderline High:  150-199 mg/dL  High:  200-499 mg/dL  Very High:  Greater than or equal to 500 mg/dL    Direct Measure HDL  Female:  Greater than or equal to 50 mg/dL   Male:  Greater than or equal to 40 mg/dL    LDL Cholesterol  Desirable:  <100mg/dL  Above Desirable:  100-129 mg/dL "   Borderline High:  130-159 mg/dL   High:  160-189 mg/dL   Very High:  >= 190 mg/dL    Non HDL Cholesterol  Desirable:  130 mg/dL  Above Desirable:  130-159 mg/dL  Borderline High:  160-189 mg/dL  High:  190-219 mg/dL  Very High:  Greater than or equal to 220 mg/dL   Comprehensive metabolic panel (BMP + Alb, Alk Phos, ALT, AST, Total. Bili, TP)   Result Value Ref Range    Sodium 139 133 - 144 mmol/L    Potassium 3.6 3.4 - 5.3 mmol/L    Chloride 104 94 - 109 mmol/L    Carbon Dioxide (CO2) 31 20 - 32 mmol/L    Anion Gap 4 3 - 14 mmol/L    Urea Nitrogen 16 7 - 30 mg/dL    Creatinine 1.01 0.66 - 1.25 mg/dL    Calcium 8.7 8.5 - 10.1 mg/dL    Glucose 106 (H) 70 - 99 mg/dL    Alkaline Phosphatase 91 40 - 150 U/L    AST 15 0 - 45 U/L    ALT 24 0 - 70 U/L    Protein Total 7.5 6.8 - 8.8 g/dL    Albumin 4.1 3.4 - 5.0 g/dL    Bilirubin Total 0.7 0.2 - 1.3 mg/dL    GFR Estimate 81 >60 mL/min/1.73m2   PSA, tumor marker   Result Value Ref Range    PSA Tumor Marker 1.18 0.00 - 4.00 ug/L    Narrative    Assay Method:  Chemiluminescence using Siemens   Vista analyzer.       ASSESSMENT / PLAN:   (Z00.00) Encounter for Medicare annual wellness exam  (primary encounter diagnosis)  Comment: Generally very healthy.  Plan:     (E78.5) Hyperlipidemia LDL goal <130  Comment: We will notify him with results refill his medication for a year.  Plan: Lipid panel reflex to direct LDL Fasting,         Comprehensive metabolic panel (BMP + Alb, Alk         Phos, ALT, AST, Total. Bili, TP)            (N40.1,  R35.0) Benign prostatic hyperplasia with urinary frequency  Comment: Having more issues with his does take Flomax offered him a urology consult if issues continue to escalate.  Plan: PSA, tumor marker                  COUNSELING:  Reviewed preventive health counseling, as reflected in patient instructions       Regular exercise       Healthy diet/nutrition       Vision screening    Estimated body mass index is 29.27 kg/m  as calculated from the  "following:    Height as of 3/15/21: 1.778 m (5' 10\").    Weight as of 3/15/21: 92.5 kg (204 lb).    Weight management plan: Discussed healthy diet and exercise guidelines    He reports that he has quit smoking. He has never used smokeless tobacco.      Appropriate preventive services were discussed with this patient, including applicable screening as appropriate for cardiovascular disease, diabetes, osteopenia/osteoporosis, and glaucoma.  As appropriate for age/gender, discussed screening for colorectal cancer, prostate cancer, breast cancer, and cervical cancer. Checklist reviewing preventive services available has been given to the patient.    Reviewed patients plan of care and provided an AVS. The Basic Care Plan (routine screening as documented in Health Maintenance) for Drake meets the Care Plan requirement. This Care Plan has been established and reviewed with the Patient.    Counseling Resources:  ATP IV Guidelines  Pooled Cohorts Equation Calculator  Breast Cancer Risk Calculator  Breast Cancer: Medication to Reduce Risk  FRAX Risk Assessment  ICSI Preventive Guidelines  Dietary Guidelines for Americans, 2010  USDA's MyPlate  ASA Prophylaxis  Lung CA Screening    Christiano Braun MD  [facility]    Identified Health Risks:  "

## 2022-04-25 NOTE — PATIENT INSTRUCTIONS
Patient Education   Personalized Prevention Plan  You are due for the preventive services outlined below.  Your care team is available to assist you in scheduling these services.  If you have already completed any of these items, please share that information with your care team to update in your medical record.  Health Maintenance Due   Topic Date Due     ANNUAL REVIEW OF HM ORDERS  Never done     Hepatitis C Screening  Never done     Zoster (Shingles) Vaccine (1 of 2) Never done     LUNG CANCER SCREENING  Never done     Pneumococcal Vaccine (1 of 1 - PPSV23) Never done     AORTIC ANEURYSM SCREENING (SYSTEM ASSIGNED)  Never done     Flu Vaccine (1) 09/01/2021     COVID-19 Vaccine (3 - Booster for Moderna series) 09/07/2021     FALL RISK ASSESSMENT  03/15/2022       Signs of Hearing Loss      Hearing much better with one ear can be a sign of hearing loss.   Hearing loss is a problem shared by many people. In fact, it is one of the most common health problems, particularly as people age. Most people age 65 and older have some hearing loss. By age 80, almost everyone does. Hearing loss often occurs slowly over the years. So you may not realize your hearing has gotten worse.  Have your hearing checked  Call your healthcare provider if you:    Have to strain to hear normal conversation    Have to watch other people s faces very carefully to follow what they re saying    Need to ask people to repeat what they ve said    Often misunderstand what people are saying    Turn the volume of the television or radio up so high that others complain    Feel that people are mumbling when they re talking to you    Find that the effort to hear leaves you feeling tired and irritated    Notice, when using the phone, that you hear better with one ear than the other  [place] last reviewed this educational content on 1/1/2020 2000-2021 [employer]. All rights reserved. This information is not intended as a substitute  for professional medical care. Always follow your healthcare professional's instructions.          Urinary Incontinence (Male)    Urinary incontinence means not being able to control the release of urine from the bladder.   Causes  Common causes of urinary incontinence in men include:    Infection    Certain medicines    Aging    Poor pelvic muscle tone    Bladder spasms    Obesity    Trouble urinating and fully emptying the bladder (urinary retention)  Other things that can cause incontinence are:     Nervous system diseases    Diabetes    Sleep apnea    Urinary tract infections    Prostate surgery    Pelvic injury  Constipation and smoking have also been identified as risk factors.   Symptoms    Urge incontinence (overactive bladder). This is a sudden urge to urinate. It occurs even though there may not be much urine in the bladder. The need to urinate often during the night is common. It's due to bladder spasms.    Stress incontinence. This is urine leakage that you can't control. It can occur with sneezing, coughing, and other actions that put stress on the bladder.    Treatment  Treatment depends on what is causing the condition. Bladder infections are treated with antibiotics. Urinary retention is treated with a bladder catheter.   Home care  Follow these guidelines when caring for yourself at home:    Don't have any foods and drinks that may irritate the bladder. This includes:  ? Chocolate  ? Alcohol  ? Caffeine  ? Carbonated drinks  ? Acidic fruits and juices    Limit fluids to 6 to 8 cups a day.    Lose weight if you are overweight. This will reduce your symptoms.    If advised, do regular pelvic muscle-strengthening exercises such as Kegel exercises.    If needed, wear absorbent pads to catch urine. Change the pads often. This is for good hygiene and to prevent skin and bladder infections.    Bathe daily for good hygiene.    If an antibiotic was prescribed to treat a bladder infection, take it until it's  finished. Keep taking it even if you are feeling better. This is to make sure your infection has cleared.    If a catheter was left in place, keep bacteria from getting into the collection bag. Don't disconnect the catheter from the collection bag.    Use a leg band to secure the catheter drainage tube, so it does not pull on the catheter. Drain the collection bag when it becomes full. To do this, use the drain spout at the bottom of the bag. Don't disconnect the bag from the catheter.    Don't pull on or try to remove a catheter. The catheter must be removed by a healthcare provider.    If you smoke, stop. Ask your provider for help if you can't do this on your own.  Follow-up care  Follow up with your healthcare provider, or as advised.  When to get medical advice  Call your healthcare provider right away if any of these occur:    Fever over 100.4 F (38 C), or as directed by your provider    Bladder pain or fullness    Belly swelling, nausea, or vomiting    Back pain    Weakness, dizziness, or fainting    If a catheter was left in place, return if:  ? The catheter falls out  ? The catheter stops draining for 6 hours  ? Your urine gets cloudy or smells bad  [place]fore last reviewed this educational content on 1/1/2020 2000-2021 [employer]. All rights reserved. This information is not intended as a substitute for professional medical care. Always follow your healthcare professional's instructions.          Depression and Suicide in Older Adults    Nearly 2 million older Americans have some type of depression. Some of them even take their own lives. Yet depression among older adults is often ignored. Learn the warning signs. You may help spare a loved one needless pain. You may also save a life.   What is depression?  Depression is a common and serious illness that affects the way you think and feel. It is not a normal part of aging, nor is it a sign of weakness, a character flaw, or something you can  "snap out of. Most people with depression need treatment to get better. The most common symptom is a feeling of deep sadness. People who are depressed also may seem tired and listless. And nothing seems to give them pleasure. It s normal to grieve or be sad sometimes. But sadness lessens or passes with time. Depression rarely goes away or improves on its own. A person with clinical depression can't \"snap out of it.\" Other symptoms of depression are:     Sleeping more or less than normal    Eating more or less than normal    Having headaches, stomachaches, or other pains that don t go away    Feeling nervous,  empty,  or worthless    Crying a great deal    Thinking or talking about suicide or death    Loss of interest in activities previously enjoyed    Social isolation    Feeling confused or forgetful  What causes it?  The causes of depression aren t fully known. But it is thought to result from a complex blend of these factors:     Biochemistry. Certain chemicals in the brain play a role.    Genes. Depression does run in families.    Life stress. Life stresses can also trigger depression in some people. Older adults often face many stressors, such as death of friends or a spouse, health problems, and financial concerns.    Chronic conditions. This includes conditions such as diabetes, heart disease, or cancer. These can cause symptoms of depression. Medicine side effects can cause changes in thoughts and behaviors.  How you can help  Often, depressed people may not want to ask for help. When they do, they may be ignored. Or, they may receive the wrong treatment. You can help by showing parents and older friends love and support. If they seem depressed, don t lecture the person, ignore the symptoms, or discount the symptoms as a  normal  part of aging -which they are not. Get involved, listen, and show interest and support.   Help them understand that depression is a treatable illness. Tell them you can help them " find the right treatment. Offer to go to their healthcare provider's appointment with them for support when the symptoms are discussed. With their approval, contact a local mental health center, social service agency, or hospital about services.   You can be an advocate for him or her at healthcare appointments. Many older adults have chronic illnesses that can cause symptoms of depression. Medicine side effects can change thoughts and behaviors. You can help make sure that the healthcare provider looks at all of these factors. He or she should refer your family member or friend to a mental healthcare provider when needed. in some cases, untreated depression can lead to a misdiagnosis. A person may be diagnosed with a brain disorder such as dementia. If the healthcare provider does not take the issue of depression seriously, help your family member or friend to find another provider.   Don't be afraid to ask  If you think an older person you care about could be suicidal, ask,  Have you thought about suicide?  Most people will tell you the truth. If they say  yes,  they may already have a plan for how and when they will attempt it. Find out as much as you can. The more detailed the plan, and the easier it is to carry out, the more danger the person is in right now. Tell the person you are there for them and do not want them to harm him or herself. Don't wait to get help for the person. Call the person's healthcare provider, local hospital, or emergency services.   To learn more    National Suicide Prevention Lifeline (crisis hotline) 242-410-ZIEB (919-543-5771)    National [place] of Mental Dujvge248-759-3210ahg.[facility].nih.gov    National [place] on Mental Yqnubnt490-382-2845mvt.gloria.org    Mental Health Bsoucnh149-914-0676bxx.[facility].org    National Suicide Milntxi869-HXRLROM (856-027-8236)    Call 796  Never leave the person alone. A person who is actively suicidal needs psychiatric care right away. They will need  constant supervision. Never leave the person out of sight. Call 911 or the national 24-hour suicide crisis hotline at 800-273-talk (418.104.6451). You can also take the person to the closest emergency room.   [place] last reviewed this educational content on 5/1/2020 2000-2021 [employer]. All rights reserved. This information is not intended as a substitute for professional medical care. Always follow your healthcare professional's instructions.

## 2022-04-25 NOTE — PROGRESS NOTES
The patient was provided with written information regarding signs of hearing loss.  Information on urinary incontinence and treatment options given to patient.  The patient's PHQ-9 score is consistent with mild depression. He was provided with information regarding depression and was advised to schedule a follow up appointment in 4 weeks to further address this issue.

## 2022-04-26 ASSESSMENT — ANXIETY QUESTIONNAIRES: GAD7 TOTAL SCORE: 9

## 2022-07-21 NOTE — PROGRESS NOTES
SUBJECTIVE:   Drake Santiago is a 68 year old male who presents to clinic today for the following health issues:      Medication Followup of Celexa    Taking Medication as prescribed: yes    Side Effects:  None    Medication Helping Symptoms:  yes           Problem list and histories reviewed & adjusted, as indicated.  Additional history: as documented        Reviewed and updated as needed this visit by clinical staff  Tobacco  Allergies  Meds  Med Hx  Surg Hx  Fam Hx  Soc Hx      Reviewed and updated as needed this visit by Provider        SUBJECTIVE:  Drake  is a 68 year old male who presents for: Follow-up of his depression and hyperlipidemia.  He needs some screening lab work done 2.  His depression is much better.  The 40 mg of Celexa is working very well.  As his emotions are much better and in control now.  He was started on Lipitor for lipids and needs a recheck here.  Doing well has not had any issues with the medication.  Also his right eye had an issue with a tear duct and he wants this looked at.    Past Medical History:   Diagnosis Date     BPH (benign prostatic hyperplasia)      Hyperlipidemia      Past Surgical History:   Procedure Laterality Date     HERNIORRHAPHY INGUINAL       REPAIR LACERATION LID Right 6/5/2017    Procedure: REPAIR LACERATION LID;  right lower lid laceration repair, right canalicular laceration repair      ;  Surgeon: Al Ac MD;  Location:  OR     Social History     Tobacco Use     Smoking status: Former Smoker     Smokeless tobacco: Never Used   Substance Use Topics     Alcohol use: Yes     Comment: glass of wine nightly      Current Outpatient Medications   Medication Sig Dispense Refill     atorvastatin (LIPITOR) 20 MG tablet TAKE ONE TABLET BY MOUTH ONCE DAILY 90 tablet 0     citalopram (CELEXA) 40 MG tablet Take 1 tablet (40 mg) by mouth daily 30 tablet 0     DIPHENHYDRAMINE HCL PO Take 50 mg by mouth nightly as needed       IBUPROFEN PO         Is This A New Presentation, Or A Follow-Up?: Skin Lesion "tamsulosin (FLOMAX) 0.4 MG capsule TAKE ONE CAPSULE BY MOUTH TWICE A  capsule 0       REVIEW OF SYSTEMS:   5 point ROS negative except as noted above in HPI, including Gen., Resp, CV, GI &  system review.     OBJECTIVE:  Vitals: /60   Pulse 80   Temp 98.1  F (36.7  C) (Temporal)   Resp 12   Ht 1.81 m (5' 11.26\")   Wt 88.4 kg (194 lb 14.4 oz)   SpO2 96%   BMI 26.99 kg/m    BMI= Body mass index is 26.99 kg/m .  He is alert and appears in no distress.  Head is normocephalic.  Right eye has an object protruding from the medial canthus lower lid.  Neatly groomed and dressed.  Good eye contact.  Bright affect.  Heart regular rhythm no murmur.  Lungs are clear to auscultation.  Skin clear.  Extremities normal.  Cholesterol is good at 137 chemistry panel is normal PSA is normal.    ASSESSMENT:  #1 depression #2 hyperlipidemia #3 status post eyelid laceration    PLAN:  Renew his Celexa.  Renew his Lipitor.  Gave him a card for [facility] to follow-up.  Will notify him of his lab tests.  Follow-up in a year.        Christiano Braun MD  [facility]            Injectable Influenza Immunization Documentation    1.  Is the person to be vaccinated sick today?   No    2. Does the person to be vaccinated have an allergy to a component   of the vaccine?   No  Egg Allergy Algorithm Link    3. Has the person to be vaccinated ever had a serious reaction   to influenza vaccine in the past?   No    4. Has the person to be vaccinated ever had Guillain-Barré syndrome?   No    Form completed by Beena Hidalgo MA         " How Severe Is Your Skin Lesion?: mild

## 2022-09-11 ENCOUNTER — HEALTH MAINTENANCE LETTER (OUTPATIENT)
Age: 72
End: 2022-09-11

## 2022-12-14 ENCOUNTER — TELEPHONE (OUTPATIENT)
Dept: FAMILY MEDICINE | Facility: CLINIC | Age: 72
End: 2022-12-14

## 2022-12-14 NOTE — TELEPHONE ENCOUNTER
Received call from central scheduling who had patient's spouse on the like, Meeta (no C2C on file).    Meeta reported that patient has been having chest pain for 2 weeks.  She mentioned he has been lifting a lot and thinks he may have pulled a muscle.  She also said that the pain comes and goes and is getting worse.    Patient is currently in [place].   Due to being out of state RN is unable to triage.  RN recommended to Meeta that patient go to nearest UC or ED for evaluation.  Meeta said she wanted to schedule an appointment for patient for Monday.  She said he will have to wait until Monday and hung up.  RN was attempting to re-iterate the need to go to UC or ED but was unable to due to the call ending.

## 2023-01-28 ENCOUNTER — HOSPITAL ENCOUNTER (EMERGENCY)
Facility: CLINIC | Age: 73
Discharge: HOME | End: 2023-01-28
Attending: EMERGENCY MEDICINE | Admitting: EMERGENCY MEDICINE
Payer: COMMERCIAL

## 2023-01-28 VITALS
HEART RATE: 75 BPM | OXYGEN SATURATION: 99 % | TEMPERATURE: 97 F | WEIGHT: 187 LBS | SYSTOLIC BLOOD PRESSURE: 142 MMHG | DIASTOLIC BLOOD PRESSURE: 105 MMHG | BODY MASS INDEX: 26.83 KG/M2 | RESPIRATION RATE: 14 BRPM

## 2023-01-28 DIAGNOSIS — S01.01XA LACERATION OF SCALP, INITIAL ENCOUNTER: ICD-10-CM

## 2023-01-28 PROCEDURE — 12002 RPR S/N/AX/GEN/TRNK2.6-7.5CM: CPT | Performed by: EMERGENCY MEDICINE

## 2023-01-28 PROCEDURE — 99282 EMERGENCY DEPT VISIT SF MDM: CPT | Performed by: EMERGENCY MEDICINE

## 2023-01-28 PROCEDURE — 99283 EMERGENCY DEPT VISIT LOW MDM: CPT | Mod: 25 | Performed by: EMERGENCY MEDICINE

## 2023-01-28 ASSESSMENT — ACTIVITIES OF DAILY LIVING (ADL): ADLS_ACUITY_SCORE: 35

## 2023-01-28 NOTE — DISCHARGE INSTRUCTIONS
-May shower    -Return if you develop severe headache, neurologic change such as change in sight, worsening of gait or balance from baseline, nausea and vomiting, visual disturbance.    -Tylenol for headache.    -Staples removed in 7-10 days

## 2023-01-28 NOTE — ED TRIAGE NOTES
Fell last night and presents with laceration to head.     Triage Assessment     Row Name 01/28/23 0862       Triage Assessment (Adult)    Airway WDL WDL    Additional Documentation Breath Sounds (Group)       Respiratory WDL    Respiratory WDL WDL       Skin Circulation/Temperature WDL    Skin Circulation/Temperature WDL WDL       Cardiac WDL    Cardiac WDL WDL       Peripheral/Neurovascular WDL    Peripheral Neurovascular WDL WDL       Cognitive/Neuro/Behavioral WDL    Cognitive/Neuro/Behavioral WDL WDL

## 2023-01-28 NOTE — ED PROVIDER NOTES
History     Chief Complaint   Patient presents with     Head Laceration     HPI  Drake Santiago is a 72 year old male who presents with a scalp laceration.  Questions but does not need closure.  He slipped on ice at his front door last evening around 9 PM.  Headache has resolved.  No neck pain.  Denies any loss of conscious.  He has had no neurologic symptoms develop such as nausea, vomiting, severe headache, altered balance, vision changes or other peripheral neurologic symptoms since the impact.  He is not on any anticoagulant or antiplatelet agent.    He took a shower this morning to get the dried blood off and and his wife thought he might need some suturing.    Allergies:  Allergies   Allergen Reactions     Bees Anaphylaxis     Penicillins Anaphylaxis       Problem List:    Patient Active Problem List    Diagnosis Date Noted     Major depressive disorder, recurrent episode, mild (H) 08/01/2018     Priority: Medium     ERRONEOUS ENCOUNTER--DISREGARD 09/27/2017     Priority: Medium     Benign prostatic hyperplasia with lower urinary tract symptoms 04/21/2017     Priority: Medium     Hyperlipidemia LDL goal <130 04/21/2017     Priority: Medium        Past Medical History:    Past Medical History:   Diagnosis Date     BPH (benign prostatic hyperplasia)      Hyperlipidemia        Past Surgical History:    Past Surgical History:   Procedure Laterality Date     HERNIORRHAPHY INGUINAL       REPAIR LACERATION LID Right 6/5/2017    Procedure: REPAIR LACERATION LID;  right lower lid laceration repair, right canalicular laceration repair      ;  Surgeon: Al Ac MD;  Location:  OR       Family History:    Family History   Problem Relation Age of Onset     Glaucoma No family hx of      Macular Degeneration No family hx of        Social History:  Marital Status:  Single [1]  Social History     Tobacco Use     Smoking status: Former     Smokeless tobacco: Never   Substance Use Topics     Alcohol use: Yes      Comment: glass of wine nightly      Drug use: No        Medications:    atorvastatin (LIPITOR) 20 MG tablet  citalopram (CELEXA) 40 MG tablet  DIPHENHYDRAMINE HCL PO  IBUPROFEN PO  sildenafil (REVATIO) 20 MG tablet  tamsulosin (FLOMAX) 0.4 MG capsule          Review of Systems   All other systems reviewed and are negative.      Physical Exam   BP: (!) 142/105  Pulse: 75  Temp: 97  F (36.1  C)  Resp: 14  Weight: 84.8 kg (187 lb)  SpO2: 99 %      Physical Exam  Vitals and nursing note reviewed.   Constitutional:       Appearance: He is not ill-appearing.   HENT:      Head: Normocephalic.        Comments: 3-4 cm laceration scalp.  Appears to traverse through 3-4 layers.  Gaping approximately 2 mm.     Nose: Nose normal.   Eyes:      Conjunctiva/sclera: Conjunctivae normal.   Cardiovascular:      Rate and Rhythm: Normal rate.   Pulmonary:      Effort: Pulmonary effort is normal.   Skin:     General: Skin is warm.      Capillary Refill: Capillary refill takes less than 2 seconds.      Findings: No rash.   Neurological:      General: No focal deficit present.      Mental Status: He is alert and oriented to person, place, and time. Mental status is at baseline.      GCS: GCS eye subscore is 4. GCS verbal subscore is 5. GCS motor subscore is 6.      Cranial Nerves: Cranial nerves 2-12 are intact.      Motor: No weakness or abnormal muscle tone.      Coordination: Coordination is intact.      Gait: Gait is intact.   Psychiatric:         Mood and Affect: Mood normal.         Behavior: Behavior normal.         ED Course                 Procedures  Verbal consent  Irrigated and cleansed.  Closed with scalp staples.  Antibiotic ointment applied.                No results found for this or any previous visit (from the past 24 hour(s)).    Medications - No data to display    Assessments & Plan (with Medical Decision Making)  Patient presents after falling last evening sustaining closed head injury    9 PM he stepped out his front  door and slipped on ice while wearing cowboy boots.  Fell back hitting his head.  No LOC.  Did have a moderate headache before he went to bed but states he slept comfortably woke up this morning with no headache, nausea, vomiting, neck pain, altered mentation or speech.  No vision changes or gait changes.  Took a shower and got rid of dried blood from his hair noted that he had a laceration that possibly needed closure so he came in.  Did note that he had a near full-thickness scalp laceration left parietal occipital location measuring 3-4 cm.  Closure completed with scalp staples after going through irrigation and cleansing of the wound.  Patient vies return in 7 to 10 days to have staples removed.     I have reviewed the nursing notes.    I have reviewed the findings, diagnosis, plan and need for follow up with the patient.          New Prescriptions    No medications on file       Final diagnoses:   Laceration of scalp, initial encounter       1/28/2023   [facility] EMERGENCY DEPT     Aiden Braun,   01/28/23 0996

## 2023-02-06 ENCOUNTER — HOSPITAL ENCOUNTER (EMERGENCY)
Facility: CLINIC | Age: 73
Discharge: HOME | End: 2023-02-06
Payer: COMMERCIAL

## 2023-03-10 ENCOUNTER — OFFICE VISIT (OUTPATIENT)
Dept: FAMILY MEDICINE | Facility: CLINIC | Age: 73
End: 2023-03-10
Payer: COMMERCIAL

## 2023-03-10 VITALS
TEMPERATURE: 98 F | RESPIRATION RATE: 10 BRPM | HEART RATE: 64 BPM | DIASTOLIC BLOOD PRESSURE: 68 MMHG | OXYGEN SATURATION: 96 % | BODY MASS INDEX: 26.92 KG/M2 | WEIGHT: 188 LBS | SYSTOLIC BLOOD PRESSURE: 118 MMHG | HEIGHT: 70 IN

## 2023-03-10 DIAGNOSIS — E78.5 HYPERLIPIDEMIA LDL GOAL <130: ICD-10-CM

## 2023-03-10 DIAGNOSIS — N40.1 BENIGN PROSTATIC HYPERPLASIA WITH LOWER URINARY TRACT SYMPTOMS, SYMPTOM DETAILS UNSPECIFIED: ICD-10-CM

## 2023-03-10 DIAGNOSIS — G47.00 INSOMNIA, UNSPECIFIED TYPE: ICD-10-CM

## 2023-03-10 DIAGNOSIS — G89.29 CHRONIC PAIN OF RIGHT KNEE: ICD-10-CM

## 2023-03-10 DIAGNOSIS — M25.561 CHRONIC PAIN OF RIGHT KNEE: ICD-10-CM

## 2023-03-10 DIAGNOSIS — R35.0 BENIGN PROSTATIC HYPERPLASIA WITH URINARY FREQUENCY: ICD-10-CM

## 2023-03-10 DIAGNOSIS — R41.3 MEMORY LOSS: ICD-10-CM

## 2023-03-10 DIAGNOSIS — F33.0 MAJOR DEPRESSIVE DISORDER, RECURRENT EPISODE, MILD (H): ICD-10-CM

## 2023-03-10 DIAGNOSIS — N52.9 ERECTILE DYSFUNCTION, UNSPECIFIED ERECTILE DYSFUNCTION TYPE: ICD-10-CM

## 2023-03-10 DIAGNOSIS — Z76.89 ENCOUNTER TO ESTABLISH CARE WITH NEW DOCTOR: Primary | ICD-10-CM

## 2023-03-10 DIAGNOSIS — N40.1 BENIGN PROSTATIC HYPERPLASIA WITH URINARY FREQUENCY: ICD-10-CM

## 2023-03-10 PROCEDURE — 99214 OFFICE O/P EST MOD 30 MIN: CPT | Performed by: FAMILY MEDICINE

## 2023-03-10 RX ORDER — SILDENAFIL 100 MG/1
100 TABLET, FILM COATED ORAL DAILY PRN
Qty: 12 TABLET | Refills: 0 | Status: SHIPPED | OUTPATIENT
Start: 2023-03-10 | End: 2023-12-07

## 2023-03-10 RX ORDER — ATORVASTATIN CALCIUM 20 MG/1
20 TABLET, FILM COATED ORAL DAILY
Qty: 30 TABLET | Refills: 11 | Status: SHIPPED | OUTPATIENT
Start: 2023-03-10 | End: 2024-04-07

## 2023-03-10 RX ORDER — TRAZODONE HYDROCHLORIDE 50 MG/1
50 TABLET, FILM COATED ORAL AT BEDTIME
Qty: 30 TABLET | Refills: 1 | Status: SHIPPED | OUTPATIENT
Start: 2023-03-10 | End: 2023-05-05

## 2023-03-10 RX ORDER — TAMSULOSIN HYDROCHLORIDE 0.4 MG/1
0.4 CAPSULE ORAL 2 TIMES DAILY
Qty: 60 CAPSULE | Refills: 11 | Status: SHIPPED | OUTPATIENT
Start: 2023-03-10 | End: 2024-04-07

## 2023-03-10 RX ORDER — FINASTERIDE 5 MG/1
5 TABLET, FILM COATED ORAL DAILY
Qty: 30 TABLET | Refills: 1 | Status: SHIPPED | OUTPATIENT
Start: 2023-03-10 | End: 2023-05-05

## 2023-03-10 RX ORDER — CITALOPRAM HYDROBROMIDE 40 MG/1
40 TABLET ORAL DAILY
Qty: 30 TABLET | Refills: 11 | Status: SHIPPED | OUTPATIENT
Start: 2023-03-10 | End: 2024-04-07

## 2023-03-10 ASSESSMENT — PATIENT HEALTH QUESTIONNAIRE - PHQ9
SUM OF ALL RESPONSES TO PHQ QUESTIONS 1-9: 6
SUM OF ALL RESPONSES TO PHQ QUESTIONS 1-9: 6
10. IF YOU CHECKED OFF ANY PROBLEMS, HOW DIFFICULT HAVE THESE PROBLEMS MADE IT FOR YOU TO DO YOUR WORK, TAKE CARE OF THINGS AT HOME, OR GET ALONG WITH OTHER PEOPLE: NOT DIFFICULT AT ALL

## 2023-03-10 NOTE — PROGRESS NOTES
Assessment & Plan     Encounter to establish care with new doctor  Patient has been under the care of Dr. Braun who retired. Patient requests transfer care to me.   Patient medications reconciled, PMH and Problem list reviewed and HM updated.  Patient Active Problem List   Diagnosis     Benign prostatic hyperplasia with lower urinary tract symptoms     Hyperlipidemia LDL goal <130     ERRONEOUS ENCOUNTER--DISREGARD     Major depressive disorder, recurrent episode, mild (H)     Current Outpatient Medications   Medication Sig Dispense Refill     atorvastatin (LIPITOR) 20 MG tablet Take 1 tablet (20 mg) by mouth daily 30 tablet 11     citalopram (CELEXA) 40 MG tablet Take 1 tablet (40 mg) by mouth daily 30 tablet 11     DIPHENHYDRAMINE HCL PO Take 50 mg by mouth nightly as needed       finasteride (PROSCAR) 5 MG tablet Take 1 tablet (5 mg) by mouth daily 30 tablet 1     IBUPROFEN PO        sildenafil (REVATIO) 20 MG tablet Take 3 tablets (60 mg) by mouth daily as needed 30 tablet 4     sildenafil (VIAGRA) 100 MG tablet Take 1 tablet (100 mg) by mouth daily as needed 12 tablet 0     tamsulosin (FLOMAX) 0.4 MG capsule Take 1 capsule (0.4 mg) by mouth 2 times daily 60 capsule 11     traZODone (DESYREL) 50 MG tablet Take 1 tablet (50 mg) by mouth At Bedtime 30 tablet 1     Past Surgical History:   Procedure Laterality Date     HERNIORRHAPHY INGUINAL       REPAIR LACERATION LID Right 6/5/2017    Procedure: REPAIR LACERATION LID;  right lower lid laceration repair, right canalicular laceration repair      ;  Surgeon: Al Ac MD;  Location:  OR     Health Maintenance Due   Topic Date Due     HEPATITIS C SCREENING  Never done     ZOSTER IMMUNIZATION (1 of 2) Never done     LUNG CANCER SCREENING  Never done     Pneumococcal Vaccine: 65+ Years (1 - PCV) Never done     AORTIC ANEURYSM SCREENING (SYSTEM ASSIGNED)  Never done         Major depressive disorder, recurrent episode, mild (H)  Chronic, with component of  SAD. The current medical regimen is effective;  continue present plan and medications. Reassess in 6 months.  - citalopram (CELEXA) 40 MG tablet; Take 1 tablet (40 mg) by mouth daily    Benign prostatic hyperplasia with urinary frequency  Chronic, persistent while on max dose Flomax.  His PSA has remained stable.  At times he has relatively good urine stream.  We will add Proscar 5 mg once daily along with his Flomax 0.4 mg twice daily.  - finasteride (PROSCAR) 5 MG tablet; Take 1 tablet (5 mg) by mouth daily    Insomnia, unspecified type  Chronic insomnia for many years.  Has difficulty falling asleep and staying asleep.  He has tried multiple over-the-counter sleep agents including Tylenol PM and Unisom and melatonin.  He would like to try low-dose trazodone as his wife takes this and it seems to help with that.  We talked about the possible interactions between trazodone and Celexa but given the low-dose of the trazodone it be willing to try for a brief period of time.  Reassess after 3 months.  - traZODone (DESYREL) 50 MG tablet; Take 1 tablet (50 mg) by mouth At Bedtime    Erectile dysfunction, unspecified erectile dysfunction type  Chronic erectile dysfunction.  He has been used to evaluate in the past but has run into some insurance issues.  He is wondering if there is a generic version of Viagra that he may be available to him.  We discussed the effectiveness of the revised ago and seems he would do well on generic Viagra at 100 mg daily.  I suggested that he ask the pharmacist for the generic option and out-of-pocket cost.  - sildenafil (VIAGRA) 100 MG tablet; Take 1 tablet (100 mg) by mouth daily as needed    Hyperlipidemia LDL goal <130  Chronic hyperlipidemia.  Most recent lipid panel is well controlled.  We will continue him on his Lipitor 20 mg once daily.  - atorvastatin (LIPITOR) 20 MG tablet; Take 1 tablet (20 mg) by mouth daily    Benign prostatic hyperplasia with lower urinary tract symptoms,  "symptom details unspecified  Chronic, persistent while on max dose Flomax.  His PSA has remained stable.  At times he has relatively good urine stream.  We will add Proscar 5 mg once daily along with his Flomax 0.4 mg twice daily.  - finasteride (PROSCAR) 5 MG tablet; Take 1 tablet (5 mg) by mouth daily  - tamsulosin (FLOMAX) 0.4 MG capsule; Take 1 capsule (0.4 mg) by mouth 2 times daily    Chronic pain of right knee  Chronic right knee pain with intermittent medial joint line tenderness.  No effusions.  No acute injury.  Will refer to sports and orthopedic data sent for evaluation.  - Orthopedic  Referral; Future    Memory loss  Patient has a family history for dementia specifically in his mother.  He is struggling with some memory issues of his own.  He wonders whether he has a risk for Alzheimer's dementia.  Given that this is his first meeting with me to establish care I would defer evaluation to neurology for formal testing.  Referral was placed to adult neurology for same.  - Adult Neurology  Referral; Future    Ordering of each unique test  Prescription drug management         BMI:   Estimated body mass index is 26.91 kg/m  as calculated from the following:    Height as of this encounter: 1.78 m (5' 10.08\").    Weight as of this encounter: 85.3 kg (188 lb).       SELF MONITORING:       - Please check blood glucose readings daily  Work on weight loss  Regular exercise    Return in about 6 months (around 9/10/2023) for Follow up, Routine preventive, with me, in person.    Arsenio Oviedo MD  [facility] ALEJANDRA Juan is a 72 year old, presenting for the following health issues:  Recheck Medication (Establish care )      History of Present Illness       Reason for visit:  Medication refill chk on colonoscopy need sleep aide meet new doctor look at referral for memory and rt knee    He eats 0-1 servings of fruits and vegetables daily.He consumes 2 sweetened " beverage(s) daily.He exercises with enough effort to increase his heart rate 9 or less minutes per day.  He exercises with enough effort to increase his heart rate 3 or less days per week. He is missing 1 dose(s) of medications per week.  He is not taking prescribed medications regularly due to remembering to take.    Today's PHQ-9         PHQ-9 Total Score: 6    PHQ-9 Q9 Thoughts of better off dead/self-harm past 2 weeks :   Not at all    How difficult have these problems made it for you to do your work, take care of things at home, or get along with other people: Not difficult at all       Hyperlipidemia Follow-Up      Are you regularly taking any medication or supplement to lower your cholesterol?   Yes- Lipitor    Are you having muscle aches or other side effects that you think could be caused by your cholesterol lowering medication?  No    Current Outpatient Medications   Medication Sig Dispense Refill     atorvastatin (LIPITOR) 20 MG tablet TAKE ONE TABLET BY MOUTH ONCE DAILY 30 tablet 11     citalopram (CELEXA) 40 MG tablet TAKE ONE TABLET BY MOUTH ONCE DAILY 30 tablet 11     DIPHENHYDRAMINE HCL PO Take 50 mg by mouth nightly as needed       IBUPROFEN PO        sildenafil (REVATIO) 20 MG tablet Take 3 tablets (60 mg) by mouth daily as needed 30 tablet 4     tamsulosin (FLOMAX) 0.4 MG capsule TAKE ONE CAPSULE BY MOUTH TWICE A DAY 60 capsule 11     Patient Active Problem List   Diagnosis     Benign prostatic hyperplasia with lower urinary tract symptoms     Hyperlipidemia LDL goal <130     ERRONEOUS ENCOUNTER--DISREGARD     Major depressive disorder, recurrent episode, mild (H)     Past Surgical History:   Procedure Laterality Date     HERNIORRHAPHY INGUINAL       REPAIR LACERATION LID Right 6/5/2017    Procedure: REPAIR LACERATION LID;  right lower lid laceration repair, right canalicular laceration repair      ;  Surgeon: Al Ac MD;  Location:  OR     Health Maintenance Due   Topic Date Due      "HEPATITIS C SCREENING  Never done     ZOSTER IMMUNIZATION (1 of 2) Never done     LUNG CANCER SCREENING  Never done     Pneumococcal Vaccine: 65+ Years (1 - PCV) Never done     AORTIC ANEURYSM SCREENING (SYSTEM ASSIGNED)  Never done         Review of Systems   Constitutional, HEENT, cardiovascular, pulmonary, gi and gu systems are negative, except as otherwise noted.      Objective    /68   Pulse 64   Temp 98  F (36.7  C)   Resp 10   Ht 1.78 m (5' 10.08\")   Wt 85.3 kg (188 lb)   SpO2 96%   BMI 26.91 kg/m    There is no height or weight on file to calculate BMI.  Physical Exam   GENERAL: healthy, alert and no distress  NECK: no adenopathy, no asymmetry, masses, or scars and thyroid normal to palpation  RESP: lungs clear to auscultation - no rales, rhonchi or wheezes  CV: regular rate and rhythm, normal S1 S2, no S3 or S4, no murmur, click or rub, no peripheral edema and peripheral pulses strong  ABDOMEN: soft, nontender, no hepatosplenomegaly, no masses and bowel sounds normal  MS: no gross musculoskeletal defects noted, no edema  PSYCH: mentation appears normal, affect normal/bright    Office Visit on 04/25/2022   Component Date Value Ref Range Status     Cholesterol 04/25/2022 165  <200 mg/dL Final     Triglycerides 04/25/2022 101  <150 mg/dL Final     Direct Measure HDL 04/25/2022 54  >=40 mg/dL Final     LDL Cholesterol Calculated 04/25/2022 91  <=100 mg/dL Final     Non HDL Cholesterol 04/25/2022 111  <130 mg/dL Final     Patient Fasting > 8hrs? 04/25/2022 Yes   Final     Sodium 04/25/2022 139  133 - 144 mmol/L Final     Potassium 04/25/2022 3.6  3.4 - 5.3 mmol/L Final     Chloride 04/25/2022 104  94 - 109 mmol/L Final     Carbon Dioxide (CO2) 04/25/2022 31  20 - 32 mmol/L Final     Anion Gap 04/25/2022 4  3 - 14 mmol/L Final     Urea Nitrogen 04/25/2022 16  7 - 30 mg/dL Final     Creatinine 04/25/2022 1.01  0.66 - 1.25 mg/dL Final     Calcium 04/25/2022 8.7  8.5 - 10.1 mg/dL Final     Glucose " 04/25/2022 106 (H)  70 - 99 mg/dL Final     Alkaline Phosphatase 04/25/2022 91  40 - 150 U/L Final     AST 04/25/2022 15  0 - 45 U/L Final     ALT 04/25/2022 24  0 - 70 U/L Final     Protein Total 04/25/2022 7.5  6.8 - 8.8 g/dL Final     Albumin 04/25/2022 4.1  3.4 - 5.0 g/dL Final     Bilirubin Total 04/25/2022 0.7  0.2 - 1.3 mg/dL Final     GFR Estimate 04/25/2022 81  >60 mL/min/1.73m2 Final    Effective December 21, 2021 eGFRcr in adults is calculated using the 2021 CKD-EPI creatinine equation which includes age and gender (Moreno et al., NEJM, DOI: 10.1056/TIMMvz3010522)     PSA Tumor Marker 04/25/2022 1.18  0.00 - 4.00 ug/L Final     No results found for this or any previous visit (from the past 24 hour(s)).

## 2023-03-27 NOTE — PROGRESS NOTES
Drake Santiago  :  1950  DOS: 3/30/2023  MRN: [id number]  PCP: Arsenio Oviedo    Sports Medicine Clinic Visit      HPI  Drake Santiago is a 72 year old male who is seen in consultation at the request of  Arsenio Oviedo M.D. presenting with right knee pain.    - Mechanism of Injury:  Chronic right knee pain.  No specific injuries that he can recall.  - Prior evaluation:  was seen by Arsenio Oviedo MD on 3/10/2023.  Recommended RICE protocol as needed and orthopedic follow-up.    - Pain Character:  Pain has been present for at least a year.  Pain is well localized to the anterior knee  without significant radiation.   - Endorses:  pain with varus movements. Constant ache throughout the knee.  - Denies:  swelling, clicking, popping, grinding, mechanical locking symptoms, instability, numbness, tingling, radicular shooting pain, weakness.   - Alleviating factors:  rest, activity modifications, ibuprofen  - Aggravating factors:  in the morning, pain with varus movements, planting foot and changing directions.  - Treatments tried: large amounts of ibuprofen (about  mg ibuprofen a day)    - Patient Goals:  discuss treatment options  - Social History: retired and likes to play [employer]    - Pertinent PMH: HLD, BPH, chronic right knee pain, denies prior interventions.      Review of Systems  Musculoskeletal: as above  Remainder of review of systems is negative including constitutional, CV, pulmonary, GI, Skin and Neurologic except as noted in HPI or medical history.    Past Medical History:   Diagnosis Date     BPH (benign prostatic hyperplasia)      Hyperlipidemia      Past Surgical History:   Procedure Laterality Date     HERNIORRHAPHY INGUINAL       REPAIR LACERATION LID Right 2017    Procedure: REPAIR LACERATION LID;  right lower lid laceration repair, right canalicular laceration repair      ;  Surgeon: Al Ac MD;  [facility]     Family History   Problem Relation Age of Onset      Glaucoma No family hx of      Macular Degeneration No family hx of          Objective  /81   Wt 85.3 kg (188 lb)   BMI 26.91 kg/m        General: healthy, alert and in no acute distress.      HEENT: no scleral icterus or conjunctival erythema.     Skin: no suspicious lesions or rash. No jaundice.     CV: regular rhythm by palpation, 2+ distal pulses.    Resp: normal respiratory effort without conversational dyspnea.     Psych: normal mood and affect.      Gait: nonantalgic, appropriate coordination and balance.     Neuro:        - Sensation to light touch:    - Intact throughout the BLE including all peripheral nerve distributions.        - MSR:      RLE  LLE  - Patella 2+ 2+  - Achilles 2+ 2+       - Special tests:   - Slump/SLR:  Neg bilaterally    MSK - Knee:       - Inspection:    - No significant swelling, erythema, warmth, ecchymosis, lesion.        - ROM:    - Full AROM/PROM with pain during knee flexion/extension.       - Palpation:    - TTP at the medial joint line, lateral joint line, medial and lateral patellar facets.  - NTTP elsewhere.        - Strength:  (*antalgic)  RLE LLE  - Hip Flexion  5 5   - Hip Extension 5 5   - Hip Abduction 5 5   - Hip Adduction 5 5  - Knee Flexion  5 5  - Knee Extension 5 5  - Dorsiflexion  5 5  - Plantarflexion 5 5  - Ext. Mac. Longus 5 5  - Inversion  5 5  - Eversion  5 5       - Special tests:        - Lachman:  Neg        - A/P drawer:  Neg       - Pivot shift:  Neg   - Chirag:  Neg     - Varus stress:  Neg for laxity or pain    - Valgus stress:  Neg for laxity or pain   - Patellar grind: Positive   - Thessaly:  Neg       Radiology  I independently reviewed today's new relevant imaging, with the following interpretation:  -XR R knee shows normal anatomic alignment without significant degenerative changes with medial and lateral compartments, degenerative changes are present in the patellofemoral compartment.  Bipartite patella present.  No acute fractures  or dislocations.  No significant knee joint effusion.      Assessment  1. Primary osteoarthritis of right knee    2. Bipartite patella        Plan  Drake Santiago is a 72 year old male that presents for evaluation of chronic right knee pain.  His pain is mild in nature with occasional flares after activity, painless popping of the kneecap, mild limitations to activities. History, diagnostic imaging, and physical exam appear most consistent with mild primary osteoarthritis and a bipartite patella.  Discussed the nature of the condition and treatment options and mutually agreed upon the following plan:    - Imaging:          - Reviewed relevant imaging in the chart.  -XR R knee ordered today pre-clinic and independently interpreted by myself.    - Reviewed results and images with patient.   - Medications:          - Discussed pharmacologic options for pain relief.   - May use NSAIDs (Ibuprofen, Naproxen) or Acetaminophen (Tylenol) as needed for pain control.  Discussed optimal regimens of alternating ibuprofen and Tylenol to limit the effects on the kidney and liver respectively.  Do not take more than the maximum daily limit of 2400 mg of ibuprofen and 3000 mg of Tylenol.  - May also use topical medications such as lidocaine, IcyHot, BioFreeze, or Voltaren gel as needed for pain control.  May use Voltaren gel up to 4 times per day as needed for pain control.  - Injections:          - Discussed possible injection options and alternatives.    - Options include intra-articular injections with corticosteroid, viscosupplementation, PRP.    - Deferred injections today and will consider them in the future as needed.   - Therapy:          - Discussed the benefits of physical therapy vs home exercise program for optimization of range of motion, flexibility, strength, stability and function.   - Preference is for a home exercise program.   - Home Exercise Program given today in clinic and recommendation given to perform  HEP daily and after exacerbations.  - Modalities:          - May use ice, heat, massage or other modalities as needed.   - Bracing:          - Discussed bracing options and recommend using either a patellar stabilizing brace with medial lateral supports (lateral J) which can be considered in the future, or an over-the-counter knee compression sleeve with a patellar cutout that may be purchased at a local pharmacy or sporting [employer] store.  - Activity:          - Encouraged to remain active and participate in regular activities as symptoms allow.    - Follow up:          - As needed in the future for re-evaluation and update to treatment plan, or sooner for new/worsening symptoms.  - Patient has clinic contact information for questions or concerns.       Mitchel Nina DO, CAQSM  [facility] - Sports Medicine  [facility] - Department of Orthopedic Surgery       Disclaimer:  This note was prepared and written using Dragon Medical dictation software. As a result, there may be errors in the script that have gone undetected. Please consider this when interpreting the information in this note.

## 2023-03-30 ENCOUNTER — ANCILLARY PROCEDURE (OUTPATIENT)
Dept: GENERAL RADIOLOGY | Facility: CLINIC | Age: 73
End: 2023-03-30
Attending: STUDENT IN AN ORGANIZED HEALTH CARE EDUCATION/TRAINING PROGRAM
Payer: COMMERCIAL

## 2023-03-30 ENCOUNTER — OFFICE VISIT (OUTPATIENT)
Dept: ORTHOPEDICS | Facility: CLINIC | Age: 73
End: 2023-03-30
Attending: FAMILY MEDICINE
Payer: COMMERCIAL

## 2023-03-30 VITALS — BODY MASS INDEX: 26.91 KG/M2 | SYSTOLIC BLOOD PRESSURE: 131 MMHG | DIASTOLIC BLOOD PRESSURE: 81 MMHG | WEIGHT: 188 LBS

## 2023-03-30 DIAGNOSIS — Q74.1 BIPARTITE PATELLA: ICD-10-CM

## 2023-03-30 DIAGNOSIS — M25.561 CHRONIC PAIN OF RIGHT KNEE: ICD-10-CM

## 2023-03-30 DIAGNOSIS — G89.29 CHRONIC PAIN OF RIGHT KNEE: ICD-10-CM

## 2023-03-30 DIAGNOSIS — M17.11 PRIMARY OSTEOARTHRITIS OF RIGHT KNEE: Primary | ICD-10-CM

## 2023-03-30 PROCEDURE — 73564 X-RAY EXAM KNEE 4 OR MORE: CPT | Mod: TC | Performed by: RADIOLOGY

## 2023-03-30 PROCEDURE — 99204 OFFICE O/P NEW MOD 45 MIN: CPT | Performed by: STUDENT IN AN ORGANIZED HEALTH CARE EDUCATION/TRAINING PROGRAM

## 2023-03-30 ASSESSMENT — PAIN SCALES - GENERAL: PAINLEVEL: MODERATE PAIN (4)

## 2023-03-30 NOTE — LETTER
3/30/2023         RE: Drake Santiago  [address] [facility] [address]        Dear Colleague,    Thank you for referring your patient, Drake Santiago, to the [facility] [place]. Please see a copy of my visit note below.    Drake Santiago  :  1950  DOS: 3/30/2023  MRN: [id number]  PCP: Arsenio Oviedo    Sports Medicine Clinic Visit      HPI  Drake Santiago is a 72 year old male who is seen in consultation at the request of  Arsenio Oviedo M.D. presenting with right knee pain.    - Mechanism of Injury:  Chronic right knee pain.  No specific injuries that he can recall.  - Prior evaluation:  was seen by Arsenio Oviedo MD on 3/10/2023.  Recommended RICE protocol as needed and orthopedic follow-up.    - Pain Character:  Pain has been present for at least a year.  Pain is well localized to the anterior knee  without significant radiation.   - Endorses:  pain with varus movements. Constant ache throughout the knee.  - Denies:  swelling, clicking, popping, grinding, mechanical locking symptoms, instability, numbness, tingling, radicular shooting pain, weakness.   - Alleviating factors:  rest, activity modifications, ibuprofen  - Aggravating factors:  in the morning, pain with varus movements, planting foot and changing directions.  - Treatments tried: large amounts of ibuprofen (about  mg ibuprofen a day)    - Patient Goals:  discuss treatment options  - Social History: retired and likes to play [employer]    - Pertinent PMH: HLD, BPH, chronic right knee pain, denies prior interventions.      Review of Systems  Musculoskeletal: as above  Remainder of review of systems is negative including constitutional, CV, pulmonary, GI, Skin and Neurologic except as noted in HPI or medical history.    Past Medical History:   Diagnosis Date     BPH (benign prostatic hyperplasia)      Hyperlipidemia      Past Surgical History:   Procedure Laterality Date      HERNIORRHAPHY INGUINAL       REPAIR LACERATION LID Right 6/5/2017    Procedure: REPAIR LACERATION LID;  right lower lid laceration repair, right canalicular laceration repair      ;  Surgeon: Al Ac MD;  Location: [facility] OR     Family History   Problem Relation Age of Onset     Glaucoma No family hx of      Macular Degeneration No family hx of          Objective  /81   Wt 85.3 kg (188 lb)   BMI 26.91 kg/m        General: healthy, alert and in no acute distress.      HEENT: no scleral icterus or conjunctival erythema.     Skin: no suspicious lesions or rash. No jaundice.     CV: regular rhythm by palpation, 2+ distal pulses.    Resp: normal respiratory effort without conversational dyspnea.     Psych: normal mood and affect.      Gait: nonantalgic, appropriate coordination and balance.     Neuro:        - Sensation to light touch:    - Intact throughout the BLE including all peripheral nerve distributions.        - MSR:      RLE  LLE  - Patella 2+ 2+  - Achilles 2+ 2+       - Special tests:   - Slump/SLR:  Neg bilaterally    MSK - Knee:       - Inspection:    - No significant swelling, erythema, warmth, ecchymosis, lesion.        - ROM:    - Full AROM/PROM with pain during knee flexion/extension.       - Palpation:    - TTP at the medial joint line, lateral joint line, medial and lateral patellar facets.  - NTTP elsewhere.        - Strength:  (*antalgic)  RLE LLE  - Hip Flexion  5 5   - Hip Extension 5 5   - Hip Abduction 5 5   - Hip Adduction 5 5  - Knee Flexion  5 5  - Knee Extension 5 5  - Dorsiflexion  5 5  - Plantarflexion 5 5  - Ext. Mac. Longus 5 5  - Inversion  5 5  - Eversion  5 5       - Special tests:        - Lachman:  Neg        - A/P drawer:  Neg       - Pivot shift:  Neg   - Chirag:  Neg     - Varus stress:  Neg for laxity or pain    - Valgus stress:  Neg for laxity or pain   - Patellar grind: Positive   - Thessaly:  Neg       Radiology  I independently reviewed today's new relevant  imaging, with the following interpretation:  -XR R knee shows normal anatomic alignment without significant degenerative changes with medial and lateral compartments, degenerative changes are present in the patellofemoral compartment.  Bipartite patella present.  No acute fractures or dislocations.  No significant knee joint effusion.      Assessment  1. Primary osteoarthritis of right knee    2. Bipartite patella        Plan  Drake Santiago is a 72 year old male that presents for evaluation of chronic right knee pain.  His pain is mild in nature with occasional flares after activity, painless popping of the kneecap, mild limitations to activities. History, diagnostic imaging, and physical exam appear most consistent with mild primary osteoarthritis and a bipartite patella.  Discussed the nature of the condition and treatment options and mutually agreed upon the following plan:    - Imaging:          - Reviewed relevant imaging in the chart.  -XR R knee ordered today pre-clinic and independently interpreted by myself.    - Reviewed results and images with patient.   - Medications:          - Discussed pharmacologic options for pain relief.   - May use NSAIDs (Ibuprofen, Naproxen) or Acetaminophen (Tylenol) as needed for pain control.  Discussed optimal regimens of alternating ibuprofen and Tylenol to limit the effects on the kidney and liver respectively.  Do not take more than the maximum daily limit of 2400 mg of ibuprofen and 3000 mg of Tylenol.  - May also use topical medications such as lidocaine, IcyHot, BioFreeze, or Voltaren gel as needed for pain control.  May use Voltaren gel up to 4 times per day as needed for pain control.  - Injections:          - Discussed possible injection options and alternatives.    - Options include intra-articular injections with corticosteroid, viscosupplementation, PRP.    - Deferred injections today and will consider them in the future as needed.   - Therapy:          -  Discussed the benefits of physical therapy vs home exercise program for optimization of range of motion, flexibility, strength, stability and function.   - Preference is for a home exercise program.   - Home Exercise Program given today in clinic and recommendation given to perform HEP daily and after exacerbations.  - Modalities:          - May use ice, heat, massage or other modalities as needed.   - Bracing:          - Discussed bracing options and recommend using either a patellar stabilizing brace with medial lateral supports (lateral J) which can be considered in the future, or an over-the-counter knee compression sleeve with a patellar cutout that may be purchased at a local pharmacy or sporting [employer] store.  - Activity:          - Encouraged to remain active and participate in regular activities as symptoms allow.    - Follow up:          - As needed in the future for re-evaluation and update to treatment plan, or sooner for new/worsening symptoms.  - Patient has clinic contact information for questions or concerns.       Mitchel Nina DO, CAQSM  [facility] - Sports Medicine  [facility] - Department of Orthopedic Surgery       Disclaimer:  This note was prepared and written using Dragon Medical dictation software. As a result, there may be errors in the script that have gone undetected. Please consider this when interpreting the information in this note.         Again, thank you for allowing me to participate in the care of your patient.        Sincerely,        Mitchel Nina DO

## 2023-03-30 NOTE — PATIENT INSTRUCTIONS
Roberta Drake Santiago ,     A copy of your assessment and our treatment plan that we discussed together is included below, as written in your medical chart.   If you have any questions, please feel free to call the clinic.     --------------------------------------------------  Drake Santiago is a 72 year old male that presents for evaluation of chronic right knee pain.  His pain is mild in nature with occasional flares after activity, painless popping of the kneecap, mild limitations to activities. History, diagnostic imaging, and physical exam appear most consistent with mild primary osteoarthritis and a bipartite patella.  Discussed the nature of the condition and treatment options and mutually agreed upon the following plan:    - Imaging:          - Reviewed relevant imaging in the chart.  -XR R knee ordered today pre-clinic and independently interpreted by myself.    - Reviewed results and images with patient.   - Medications:          - Discussed pharmacologic options for pain relief.   - May use NSAIDs (Ibuprofen, Naproxen) or Acetaminophen (Tylenol) as needed for pain control.  Discussed optimal regimens of alternating ibuprofen and Tylenol to limit the effects on the kidney and liver respectively.  Do not take more than the maximum daily limit of 2400 mg of ibuprofen and 3000 mg of Tylenol.  - May also use topical medications such as lidocaine, IcyHot, BioFreeze, or Voltaren gel as needed for pain control.  May use Voltaren gel up to 4 times per day as needed for pain control.  - Injections:          - Discussed possible injection options and alternatives.    - Options include intra-articular injections with corticosteroid, viscosupplementation, PRP.    - Deferred injections today and will consider them in the future as needed.   - Therapy:          - Discussed the benefits of physical therapy vs home exercise program for optimization of range of motion, flexibility, strength, stability and function.    - Preference is for a home exercise program.   - Home Exercise Program given today in clinic and recommendation given to perform HEP daily and after exacerbations.  - Modalities:          - May use ice, heat, massage or other modalities as needed.   - Bracing:          - Discussed bracing options and recommend using either a patellar stabilizing brace with medial lateral supports (lateral J) which can be considered in the future, or an over-the-counter knee compression sleeve with a patellar cutout that may be purchased at a local pharmacy or sporting [employer] store.  - Activity:          - Encouraged to remain active and participate in regular activities as symptoms allow.    - Follow up:          - As needed in the future for re-evaluation and update to treatment plan, or sooner for new/worsening symptoms.  - Patient has clinic contact information for questions or concerns.   --------------------------------------------------    It was a pleasure seeing you today. Thank you for choosing [facility] for your care.       Mitchel Nina DO, HANSEL  [facility] - Sports Medicine  [facility] - Department of Orthopedic Surgery     Disclaimer:  This note was prepared and written using Dragon Medical dictation software. As a result, there may be errors in the script that have gone undetected. Please consider this when interpreting the information in this note.

## 2023-03-30 NOTE — PROGRESS NOTES
Appropriate assistive devices provided during their visit. na (Yes, No, N/A) na (list device)    Exam table and/or cart  placed in the lowest position. na (Yes, No, N/A)    Brakes on tables/carts/wheelchairs used at all times. yes (Yes, No, N/A)    Non slip footwear applied. na (Yes, No, NA)    Patient was accompanied by staff throughout visit. yes (Yes, No, N/A)    Equipment safety straps used. na (Yes, No, N/A)    Assist with toileting. na (Yes, No, N/A)

## 2023-04-20 ENCOUNTER — PATIENT OUTREACH (OUTPATIENT)
Dept: CARE COORDINATION | Facility: CLINIC | Age: 73
End: 2023-04-20
Payer: COMMERCIAL

## 2023-05-05 DIAGNOSIS — G47.00 INSOMNIA, UNSPECIFIED TYPE: ICD-10-CM

## 2023-05-05 DIAGNOSIS — N40.1 BENIGN PROSTATIC HYPERPLASIA WITH URINARY FREQUENCY: ICD-10-CM

## 2023-05-05 DIAGNOSIS — R35.0 BENIGN PROSTATIC HYPERPLASIA WITH URINARY FREQUENCY: ICD-10-CM

## 2023-05-05 RX ORDER — TRAZODONE HYDROCHLORIDE 50 MG/1
TABLET, FILM COATED ORAL
Qty: 30 TABLET | Refills: 3 | Status: SHIPPED | OUTPATIENT
Start: 2023-05-05 | End: 2023-08-23

## 2023-05-05 RX ORDER — FINASTERIDE 5 MG/1
TABLET, FILM COATED ORAL
Qty: 30 TABLET | Refills: 3 | Status: SHIPPED | OUTPATIENT
Start: 2023-05-05 | End: 2023-08-23

## 2023-05-05 NOTE — TELEPHONE ENCOUNTER
Pending Prescriptions:                       Disp   Refills    traZODone (DESYREL) 50 MG tablet [Pharmac*30 tab*3            Sig: TAKE ONE TABLET BY MOUTH AT BEDTIME    finasteride (PROSCAR) 5 MG tablet [Pharma*30 tab*3            Sig: TAKE ONE TABLET BY MOUTH ONCE DAILY    Medication is being filled for 1 time carlos refill only due to:  Patient is due for annual in Sept    Please call and help schedule.  Thank you!

## 2023-06-03 ENCOUNTER — HEALTH MAINTENANCE LETTER (OUTPATIENT)
Age: 73
End: 2023-06-03

## 2023-07-05 NOTE — TELEPHONE ENCOUNTER
RECORDS RECEIVED FROM: Internal   REASON FOR VISIT: memory loss   Date of Appt: 10/03/2023    NOTES (FOR ALL VISITS) STATUS DETAILS   OFFICE NOTE from referring provider Internal 03/10/2023 Dr Oviedo [facility]    OFFICE NOTE from other specialist N/A    DISCHARGE SUMMARY from hospital N/A    DISCHARGE REPORT from the ER N/A    OPERATIVE REPORT N/A    [facility] (MS ONLY) N/A    EMG N/A    EEG N/A    MEDICATION LIST N/A    IMAGING  (FOR ALL VISITS)     LUMBAR PUNCTURE N/A    SANJU SCAN (MOVEMENT) N/A    ULTRASOUND (CAROTID BILAT) *VASCULAR* N/A    MRI (HEAD, NECK, SPINE) Internal 04/02/2018 brain    CT (HEAD, NECK, SPINE) N/A

## 2023-08-23 DIAGNOSIS — R35.0 BENIGN PROSTATIC HYPERPLASIA WITH URINARY FREQUENCY: ICD-10-CM

## 2023-08-23 DIAGNOSIS — G47.00 INSOMNIA, UNSPECIFIED TYPE: ICD-10-CM

## 2023-08-23 DIAGNOSIS — N40.1 BENIGN PROSTATIC HYPERPLASIA WITH URINARY FREQUENCY: ICD-10-CM

## 2023-08-23 RX ORDER — FINASTERIDE 5 MG/1
TABLET, FILM COATED ORAL
Qty: 30 TABLET | Refills: 1 | Status: SHIPPED | OUTPATIENT
Start: 2023-08-23 | End: 2023-10-23

## 2023-08-23 RX ORDER — TRAZODONE HYDROCHLORIDE 50 MG/1
TABLET, FILM COATED ORAL
Qty: 30 TABLET | Refills: 1 | Status: SHIPPED | OUTPATIENT
Start: 2023-08-23 | End: 2023-10-23
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
## 2023-10-03 ENCOUNTER — OFFICE VISIT (OUTPATIENT)
Dept: NEUROLOGY | Facility: CLINIC | Age: 73
End: 2023-10-03
Attending: FAMILY MEDICINE
Payer: COMMERCIAL

## 2023-10-03 ENCOUNTER — PRE VISIT (OUTPATIENT)
Dept: NEUROLOGY | Facility: CLINIC | Age: 73
End: 2023-10-03

## 2023-10-03 VITALS
BODY MASS INDEX: 24.28 KG/M2 | DIASTOLIC BLOOD PRESSURE: 72 MMHG | HEART RATE: 57 BPM | WEIGHT: 169.6 LBS | SYSTOLIC BLOOD PRESSURE: 115 MMHG

## 2023-10-03 DIAGNOSIS — R41.3 MEMORY LOSS: Primary | ICD-10-CM

## 2023-10-03 DIAGNOSIS — R26.89 IMBALANCE: ICD-10-CM

## 2023-10-03 DIAGNOSIS — F33.0 MAJOR DEPRESSIVE DISORDER, RECURRENT EPISODE, MILD (H): ICD-10-CM

## 2023-10-03 DIAGNOSIS — R06.83 SNORING: ICD-10-CM

## 2023-10-03 LAB
ALBUMIN SERPL BCG-MCNC: 4.4 G/DL (ref 3.5–5.2)
ALP SERPL-CCNC: 80 U/L (ref 40–129)
ALT SERPL W P-5'-P-CCNC: 18 U/L (ref 0–70)
ANION GAP SERPL CALCULATED.3IONS-SCNC: 8 MMOL/L (ref 7–15)
AST SERPL W P-5'-P-CCNC: 24 U/L (ref 0–45)
BILIRUB SERPL-MCNC: 0.5 MG/DL
BUN SERPL-MCNC: 17.1 MG/DL (ref 8–23)
CALCIUM SERPL-MCNC: 9.3 MG/DL (ref 8.8–10.2)
CHLORIDE SERPL-SCNC: 101 MMOL/L (ref 98–107)
CREAT SERPL-MCNC: 0.93 MG/DL (ref 0.67–1.17)
DEPRECATED HCO3 PLAS-SCNC: 31 MMOL/L (ref 22–29)
EGFRCR SERPLBLD CKD-EPI 2021: 88 ML/MIN/1.73M2
ERYTHROCYTE [DISTWIDTH] IN BLOOD BY AUTOMATED COUNT: 13.4 % (ref 10–15)
GLUCOSE SERPL-MCNC: 114 MG/DL (ref 70–99)
HCT VFR BLD AUTO: 43.1 % (ref 40–53)
HGB BLD-MCNC: 14.1 G/DL (ref 13.3–17.7)
MCH RBC QN AUTO: 31.5 PG (ref 26.5–33)
MCHC RBC AUTO-ENTMCNC: 32.7 G/DL (ref 31.5–36.5)
MCV RBC AUTO: 96 FL (ref 78–100)
PLATELET # BLD AUTO: 260 10E3/UL (ref 150–450)
POTASSIUM SERPL-SCNC: 4.2 MMOL/L (ref 3.4–5.3)
PROT SERPL-MCNC: 7 G/DL (ref 6.4–8.3)
RBC # BLD AUTO: 4.48 10E6/UL (ref 4.4–5.9)
SODIUM SERPL-SCNC: 140 MMOL/L (ref 135–145)
TSH SERPL DL<=0.005 MIU/L-ACNC: 0.72 UIU/ML (ref 0.3–4.2)
WBC # BLD AUTO: 4.9 10E3/UL (ref 4–11)

## 2023-10-03 PROCEDURE — 99000 SPECIMEN HANDLING OFFICE-LAB: CPT | Performed by: INTERNAL MEDICINE

## 2023-10-03 PROCEDURE — 84425 ASSAY OF VITAMIN B-1: CPT | Mod: 90 | Performed by: INTERNAL MEDICINE

## 2023-10-03 PROCEDURE — 80053 COMPREHEN METABOLIC PANEL: CPT | Performed by: INTERNAL MEDICINE

## 2023-10-03 PROCEDURE — 83520 IMMUNOASSAY QUANT NOS NONAB: CPT | Mod: 90 | Performed by: INTERNAL MEDICINE

## 2023-10-03 PROCEDURE — 84446 ASSAY OF VITAMIN E: CPT | Mod: 90 | Performed by: INTERNAL MEDICINE

## 2023-10-03 PROCEDURE — 36415 COLL VENOUS BLD VENIPUNCTURE: CPT | Performed by: INTERNAL MEDICINE

## 2023-10-03 PROCEDURE — 99205 OFFICE O/P NEW HI 60 MIN: CPT | Performed by: INTERNAL MEDICINE

## 2023-10-03 PROCEDURE — 84443 ASSAY THYROID STIM HORMONE: CPT | Performed by: INTERNAL MEDICINE

## 2023-10-03 PROCEDURE — 85027 COMPLETE CBC AUTOMATED: CPT | Performed by: INTERNAL MEDICINE

## 2023-10-03 PROCEDURE — 82607 VITAMIN B-12: CPT | Performed by: INTERNAL MEDICINE

## 2023-10-03 RX ORDER — LORAZEPAM 0.5 MG/1
TABLET ORAL
Qty: 2 TABLET | Refills: 0 | Status: SHIPPED | OUTPATIENT
Start: 2023-10-03 | End: 2024-01-26
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
## 2023-10-04 ENCOUNTER — HOSPITAL ENCOUNTER (OUTPATIENT)
Dept: MRI IMAGING | Facility: CLINIC | Age: 73
Discharge: HOME | End: 2023-10-04
Attending: INTERNAL MEDICINE | Admitting: INTERNAL MEDICINE
Payer: COMMERCIAL

## 2023-10-04 DIAGNOSIS — R41.3 MEMORY LOSS: ICD-10-CM

## 2023-10-04 DIAGNOSIS — R26.89 IMBALANCE: ICD-10-CM

## 2023-10-04 LAB — VIT B12 SERPL-MCNC: 213 PG/ML (ref 232–1245)

## 2023-10-04 PROCEDURE — 70551 MRI BRAIN STEM W/O DYE: CPT

## 2023-10-05 ENCOUNTER — TELEPHONE (OUTPATIENT)
Dept: NEUROLOGY | Facility: CLINIC | Age: 73
End: 2023-10-05
Payer: COMMERCIAL

## 2023-10-05 RX ORDER — LANOLIN ALCOHOL/MO/W.PET/CERES
1000 CREAM (GRAM) TOPICAL DAILY
Qty: 90 TABLET | Refills: 3 | Status: SHIPPED | OUTPATIENT
Start: 2023-10-05 | End: 2024-09-06

## 2023-10-05 NOTE — TELEPHONE ENCOUNTER
----- Message from Mitchel Briones MD sent at 10/5/2023  3:55 PM CDT -----  Yes I think we should get it drawn. We can let him know that I would recommend this because his B12 is low. I will also send a B12 supplement to his pharmacy, which I recommend he start taking. Thanks!

## 2023-10-05 NOTE — TELEPHONE ENCOUNTER
Called and spoke with patient. Let him know per Dr. Briones that his MRI brain came back normal, which is good news. Also let him know that his vitamin B12 was low and Dr. Briones recommends that he start taking a B12 supplement that he sent to his pharmacy. He also recommends that the patient get a methylmalonic acid lab drawn. Patient stated understanding and said he would try to get it drawn tomorrow. No further questions at this time.

## 2023-10-06 ENCOUNTER — LAB (OUTPATIENT)
Dept: LAB | Facility: CLINIC | Age: 73
End: 2023-10-06
Payer: COMMERCIAL

## 2023-10-06 DIAGNOSIS — R41.3 MEMORY LOSS: ICD-10-CM

## 2023-10-06 LAB
A-TOCOPHEROL VIT E SERPL-MCNC: 7.8 MG/L
BETA+GAMMA TOCOPHEROL SERPL-MCNC: 1.6 MG/L
VIT B1 PYROPHOSHATE BLD-SCNC: 141 NMOL/L

## 2023-10-06 PROCEDURE — 36415 COLL VENOUS BLD VENIPUNCTURE: CPT

## 2023-10-06 PROCEDURE — 83921 ORGANIC ACID SINGLE QUANT: CPT

## 2023-10-11 DIAGNOSIS — R41.3 MEMORY LOSS: Primary | ICD-10-CM

## 2023-10-11 LAB — METHYLMALONATE SERPL-SCNC: 0.41 UMOL/L (ref 0–0.4)

## 2023-10-12 LAB — SCANNED LAB RESULT: NORMAL

## 2023-10-16 ENCOUNTER — TELEPHONE (OUTPATIENT)
Dept: NEUROLOGY | Facility: CLINIC | Age: 73
End: 2023-10-16
Payer: COMMERCIAL

## 2023-10-16 NOTE — TELEPHONE ENCOUNTER
Roberta Juan,     Your lab results are suggestive of mild B12 deficiency. I would recommend continuing the B12 supplement and then calling to schedule a repeat blood draw to check levels in about 3 months. I've placed orders for these levels.     Let us know if you have any questions.     Regards,  Mitchel Briones MD

## 2023-10-18 ENCOUNTER — TELEPHONE (OUTPATIENT)
Dept: NEUROLOGY | Facility: CLINIC | Age: 73
End: 2023-10-18
Payer: COMMERCIAL

## 2023-10-18 NOTE — TELEPHONE ENCOUNTER
Called and spoke with patient. Read off response from Dr. Briones as follows:    Poor sleep and mood symptoms can cause memory symptoms. I had placed sleep referral and mental health referrals at last visit. Low B12 levels can also cause memory and balance problems. If memory is not improving after seeing sleep and mental health providers, we could consider doing neuropsychology testing. It'd be good to get a follow-up to see him back in 6 months to see how he is doing.    For the balance, one option would be to consider a referral to physical therapy and see if that helps. It'd be reasonable also to do MRI imaging of the spine to look for abnormalities there affecting balance. Let me know if he wants to do either of t hese options. Thanks!    Patient stated understanding, and said [place] until the elij[place] of October. He said he would work on setting these up when he returned to [place]. Writer stated understanding

## 2023-10-18 NOTE — TELEPHONE ENCOUNTER
Called and spoke with patient. Read off message from Dr. Briones as follows:    Roberta Juan,     Your Alzheimer's disease test came back within the intermediate risk range for Alzheimer's disease. It is reassuring that the test didn't come back within the high risk range group.     Let us know if you have any questions about this test result.     Regards,  Mitchel Briones MD    Patient had several questions including: If it is not alzheimer's, is there a different underlying cause of his memory and balance issues? Also, are there any medications that Dr. Briones would recommend, like namenda?

## 2023-10-21 DIAGNOSIS — N40.1 BENIGN PROSTATIC HYPERPLASIA WITH URINARY FREQUENCY: ICD-10-CM

## 2023-10-21 DIAGNOSIS — R35.0 BENIGN PROSTATIC HYPERPLASIA WITH URINARY FREQUENCY: ICD-10-CM

## 2023-10-21 DIAGNOSIS — G47.00 INSOMNIA, UNSPECIFIED TYPE: ICD-10-CM

## 2023-10-23 RX ORDER — FINASTERIDE 5 MG/1
TABLET, FILM COATED ORAL
Qty: 30 TABLET | Refills: 1 | Status: SHIPPED | OUTPATIENT
Start: 2023-10-23 | End: 2024-01-03

## 2023-10-23 RX ORDER — TRAZODONE HYDROCHLORIDE 50 MG/1
TABLET, FILM COATED ORAL
Qty: 30 TABLET | Refills: 1 | Status: SHIPPED | OUTPATIENT
Start: 2023-10-23 | End: 2024-01-03

## 2023-11-06 NOTE — TELEPHONE ENCOUNTER
Called and left message for patient. Want to discus scheduling some appointments that Dr. Briones recommended. Want to schedule 6 month follow up with Dr. Briones (should be in early April), and give number for sleep evaluation ([phone]) and mental health referral ([phone])

## 2023-11-10 NOTE — TELEPHONE ENCOUNTER
Called and spoke with patient about scheduling the appointments. He is currently in [place] visiting his grandkids, but asked that we send a text with the scheduling information. I let him know that I wasn't able to send him a text, but I could send him a TAPP message. Patient stated that would work, and he would schedule the appointments. No further questions at this time.

## 2023-12-07 DIAGNOSIS — N52.9 ERECTILE DYSFUNCTION, UNSPECIFIED ERECTILE DYSFUNCTION TYPE: ICD-10-CM

## 2023-12-07 RX ORDER — SILDENAFIL 100 MG/1
100 TABLET, FILM COATED ORAL DAILY PRN
Qty: 12 TABLET | Refills: 0 | Status: SHIPPED | OUTPATIENT
Start: 2023-12-07 | End: 2024-04-25

## 2023-12-31 DIAGNOSIS — N40.1 BENIGN PROSTATIC HYPERPLASIA WITH URINARY FREQUENCY: ICD-10-CM

## 2023-12-31 DIAGNOSIS — R35.0 BENIGN PROSTATIC HYPERPLASIA WITH URINARY FREQUENCY: ICD-10-CM

## 2023-12-31 DIAGNOSIS — G47.00 INSOMNIA, UNSPECIFIED TYPE: ICD-10-CM

## 2024-01-03 RX ORDER — TRAZODONE HYDROCHLORIDE 50 MG/1
TABLET, FILM COATED ORAL
Qty: 30 TABLET | Refills: 1 | Status: SHIPPED | OUTPATIENT
Start: 2024-01-03 | End: 2024-03-05

## 2024-01-03 RX ORDER — FINASTERIDE 5 MG/1
TABLET, FILM COATED ORAL
Qty: 30 TABLET | Refills: 1 | Status: SHIPPED | OUTPATIENT
Start: 2024-01-03 | End: 2024-03-05

## 2024-01-26 ENCOUNTER — ANCILLARY PROCEDURE (OUTPATIENT)
Dept: GENERAL RADIOLOGY | Facility: OTHER | Age: 74
End: 2024-01-26
Attending: PHYSICIAN ASSISTANT
Payer: COMMERCIAL

## 2024-01-26 ENCOUNTER — OFFICE VISIT (OUTPATIENT)
Dept: FAMILY MEDICINE | Facility: OTHER | Age: 74
End: 2024-01-26
Payer: COMMERCIAL

## 2024-01-26 ENCOUNTER — DOCUMENTATION ONLY (OUTPATIENT)
Dept: FAMILY MEDICINE | Facility: CLINIC | Age: 74
End: 2024-01-26

## 2024-01-26 VITALS
WEIGHT: 161.5 LBS | SYSTOLIC BLOOD PRESSURE: 114 MMHG | TEMPERATURE: 98.2 F | DIASTOLIC BLOOD PRESSURE: 64 MMHG | HEART RATE: 78 BPM | BODY MASS INDEX: 23.12 KG/M2 | HEIGHT: 70 IN | OXYGEN SATURATION: 95 % | RESPIRATION RATE: 14 BRPM

## 2024-01-26 DIAGNOSIS — Z12.5 SCREENING FOR PROSTATE CANCER: ICD-10-CM

## 2024-01-26 DIAGNOSIS — R63.4 WEIGHT LOSS: ICD-10-CM

## 2024-01-26 DIAGNOSIS — R41.3 MEMORY LOSS: ICD-10-CM

## 2024-01-26 DIAGNOSIS — Z87.891 PERSONAL HISTORY OF TOBACCO USE, PRESENTING HAZARDS TO HEALTH: ICD-10-CM

## 2024-01-26 DIAGNOSIS — R05.2 SUBACUTE COUGH: ICD-10-CM

## 2024-01-26 DIAGNOSIS — R05.2 SUBACUTE COUGH: Primary | ICD-10-CM

## 2024-01-26 DIAGNOSIS — F33.0 MAJOR DEPRESSIVE DISORDER, RECURRENT EPISODE, MILD (H): ICD-10-CM

## 2024-01-26 DIAGNOSIS — R63.0 APPETITE LOSS: Primary | ICD-10-CM

## 2024-01-26 LAB
ALBUMIN SERPL BCG-MCNC: 4.1 G/DL (ref 3.5–5.2)
ALP SERPL-CCNC: 88 U/L (ref 40–150)
ALT SERPL W P-5'-P-CCNC: 15 U/L (ref 0–70)
ANION GAP SERPL CALCULATED.3IONS-SCNC: 13 MMOL/L (ref 7–15)
AST SERPL W P-5'-P-CCNC: 18 U/L (ref 0–45)
BASOPHILS # BLD AUTO: 0 10E3/UL (ref 0–0.2)
BASOPHILS NFR BLD AUTO: 1 %
BILIRUB SERPL-MCNC: 0.5 MG/DL
BUN SERPL-MCNC: 15.9 MG/DL (ref 8–23)
CALCIUM SERPL-MCNC: 9.4 MG/DL (ref 8.8–10.2)
CHLORIDE SERPL-SCNC: 102 MMOL/L (ref 98–107)
CREAT SERPL-MCNC: 0.91 MG/DL (ref 0.67–1.17)
CRP SERPL-MCNC: <3 MG/L
DEPRECATED HCO3 PLAS-SCNC: 27 MMOL/L (ref 22–29)
EGFRCR SERPLBLD CKD-EPI 2021: 90 ML/MIN/1.73M2
EOSINOPHIL # BLD AUTO: 0 10E3/UL (ref 0–0.7)
EOSINOPHIL NFR BLD AUTO: 1 %
ERYTHROCYTE [DISTWIDTH] IN BLOOD BY AUTOMATED COUNT: 13.3 % (ref 10–15)
ERYTHROCYTE [SEDIMENTATION RATE] IN BLOOD BY WESTERGREN METHOD: 12 MM/HR (ref 0–20)
GLUCOSE SERPL-MCNC: 89 MG/DL (ref 70–99)
HCT VFR BLD AUTO: 40.6 % (ref 40–53)
HGB BLD-MCNC: 13.5 G/DL (ref 13.3–17.7)
IMM GRANULOCYTES # BLD: 0 10E3/UL
IMM GRANULOCYTES NFR BLD: 0 %
LYMPHOCYTES # BLD AUTO: 1.1 10E3/UL (ref 0.8–5.3)
LYMPHOCYTES NFR BLD AUTO: 14 %
MCH RBC QN AUTO: 31.3 PG (ref 26.5–33)
MCHC RBC AUTO-ENTMCNC: 33.3 G/DL (ref 31.5–36.5)
MCV RBC AUTO: 94 FL (ref 78–100)
MONOCYTES # BLD AUTO: 0.5 10E3/UL (ref 0–1.3)
MONOCYTES NFR BLD AUTO: 7 %
NEUTROPHILS # BLD AUTO: 5.8 10E3/UL (ref 1.6–8.3)
NEUTROPHILS NFR BLD AUTO: 78 %
PLATELET # BLD AUTO: 278 10E3/UL (ref 150–450)
POTASSIUM SERPL-SCNC: 4.4 MMOL/L (ref 3.4–5.3)
PROT SERPL-MCNC: 7.4 G/DL (ref 6.4–8.3)
PSA SERPL DL<=0.01 NG/ML-MCNC: 0.63 NG/ML (ref 0–6.5)
RBC # BLD AUTO: 4.31 10E6/UL (ref 4.4–5.9)
SODIUM SERPL-SCNC: 142 MMOL/L (ref 135–145)
TSH SERPL DL<=0.005 MIU/L-ACNC: 0.94 UIU/ML (ref 0.3–4.2)
WBC # BLD AUTO: 7.4 10E3/UL (ref 4–11)

## 2024-01-26 PROCEDURE — 99214 OFFICE O/P EST MOD 30 MIN: CPT | Performed by: PHYSICIAN ASSISTANT

## 2024-01-26 PROCEDURE — 36415 COLL VENOUS BLD VENIPUNCTURE: CPT | Performed by: PHYSICIAN ASSISTANT

## 2024-01-26 PROCEDURE — 86140 C-REACTIVE PROTEIN: CPT | Performed by: PHYSICIAN ASSISTANT

## 2024-01-26 PROCEDURE — 84443 ASSAY THYROID STIM HORMONE: CPT | Performed by: PHYSICIAN ASSISTANT

## 2024-01-26 PROCEDURE — 83921 ORGANIC ACID SINGLE QUANT: CPT | Performed by: PHYSICIAN ASSISTANT

## 2024-01-26 PROCEDURE — 82607 VITAMIN B-12: CPT | Performed by: PHYSICIAN ASSISTANT

## 2024-01-26 PROCEDURE — 85025 COMPLETE CBC W/AUTO DIFF WBC: CPT | Performed by: PHYSICIAN ASSISTANT

## 2024-01-26 PROCEDURE — 85652 RBC SED RATE AUTOMATED: CPT | Performed by: PHYSICIAN ASSISTANT

## 2024-01-26 PROCEDURE — G0103 PSA SCREENING: HCPCS | Performed by: PHYSICIAN ASSISTANT

## 2024-01-26 PROCEDURE — 80053 COMPREHEN METABOLIC PANEL: CPT | Performed by: PHYSICIAN ASSISTANT

## 2024-01-26 PROCEDURE — 71046 X-RAY EXAM CHEST 2 VIEWS: CPT | Mod: TC | Performed by: INTERNAL MEDICINE

## 2024-01-26 RX ORDER — PREDNISONE 20 MG/1
20 TABLET ORAL 2 TIMES DAILY
Qty: 10 TABLET | Refills: 0 | Status: SHIPPED | OUTPATIENT
Start: 2024-01-26 | End: 2024-04-10

## 2024-01-26 RX ORDER — ALBUTEROL SULFATE 90 UG/1
1-2 AEROSOL, METERED RESPIRATORY (INHALATION) EVERY 6 HOURS PRN
Qty: 18 G | Refills: 0 | Status: SHIPPED | OUTPATIENT
Start: 2024-01-26 | End: 2024-04-25

## 2024-01-26 ASSESSMENT — PATIENT HEALTH QUESTIONNAIRE - PHQ9
SUM OF ALL RESPONSES TO PHQ QUESTIONS 1-9: 7
SUM OF ALL RESPONSES TO PHQ QUESTIONS 1-9: 7
10. IF YOU CHECKED OFF ANY PROBLEMS, HOW DIFFICULT HAVE THESE PROBLEMS MADE IT FOR YOU TO DO YOUR WORK, TAKE CARE OF THINGS AT HOME, OR GET ALONG WITH OTHER PEOPLE: SOMEWHAT DIFFICULT

## 2024-01-26 ASSESSMENT — ENCOUNTER SYMPTOMS: COUGH: 1

## 2024-01-27 DIAGNOSIS — G47.00 INSOMNIA, UNSPECIFIED TYPE: ICD-10-CM

## 2024-01-27 DIAGNOSIS — F33.0 MAJOR DEPRESSIVE DISORDER, RECURRENT EPISODE, MILD (H): Primary | ICD-10-CM

## 2024-01-27 DIAGNOSIS — R63.0 APPETITE LOSS: ICD-10-CM

## 2024-01-27 LAB — VIT B12 SERPL-MCNC: 618 PG/ML (ref 232–1245)

## 2024-01-27 RX ORDER — MIRTAZAPINE 15 MG/1
15 TABLET, FILM COATED ORAL AT BEDTIME
Qty: 30 TABLET | Refills: 2 | Status: SHIPPED | OUTPATIENT
Start: 2024-01-27 | End: 2024-04-10 | Stop reason: ALTCHOICE

## 2024-01-30 LAB — METHYLMALONATE SERPL-SCNC: 0.22 UMOL/L (ref 0–0.4)

## 2024-03-05 DIAGNOSIS — R35.0 BENIGN PROSTATIC HYPERPLASIA WITH URINARY FREQUENCY: ICD-10-CM

## 2024-03-05 DIAGNOSIS — N40.1 BENIGN PROSTATIC HYPERPLASIA WITH URINARY FREQUENCY: ICD-10-CM

## 2024-03-05 DIAGNOSIS — G47.00 INSOMNIA, UNSPECIFIED TYPE: ICD-10-CM

## 2024-03-05 RX ORDER — FINASTERIDE 5 MG/1
TABLET, FILM COATED ORAL
Qty: 90 TABLET | Refills: 0 | Status: SHIPPED | OUTPATIENT
Start: 2024-03-05 | End: 2024-04-25

## 2024-03-05 RX ORDER — TRAZODONE HYDROCHLORIDE 50 MG/1
TABLET, FILM COATED ORAL
Qty: 90 TABLET | Refills: 0 | Status: SHIPPED | OUTPATIENT
Start: 2024-03-05 | End: 2024-04-25

## 2024-04-05 DIAGNOSIS — F33.0 MAJOR DEPRESSIVE DISORDER, RECURRENT EPISODE, MILD (H): ICD-10-CM

## 2024-04-05 DIAGNOSIS — N40.1 BENIGN PROSTATIC HYPERPLASIA WITH LOWER URINARY TRACT SYMPTOMS, SYMPTOM DETAILS UNSPECIFIED: ICD-10-CM

## 2024-04-05 DIAGNOSIS — E78.5 HYPERLIPIDEMIA LDL GOAL <130: ICD-10-CM

## 2024-04-07 RX ORDER — ATORVASTATIN CALCIUM 20 MG/1
20 TABLET, FILM COATED ORAL DAILY
Qty: 30 TABLET | Refills: 4 | Status: SHIPPED | OUTPATIENT
Start: 2024-04-07 | End: 2024-04-25

## 2024-04-07 RX ORDER — TAMSULOSIN HYDROCHLORIDE 0.4 MG/1
0.4 CAPSULE ORAL 2 TIMES DAILY
Qty: 60 CAPSULE | Refills: 4 | Status: SHIPPED | OUTPATIENT
Start: 2024-04-07 | End: 2024-04-25

## 2024-04-07 RX ORDER — CITALOPRAM HYDROBROMIDE 40 MG/1
40 TABLET ORAL DAILY
Qty: 30 TABLET | Refills: 5 | Status: SHIPPED | OUTPATIENT
Start: 2024-04-07 | End: 2024-04-25

## 2024-04-10 ENCOUNTER — OFFICE VISIT (OUTPATIENT)
Dept: FAMILY MEDICINE | Facility: CLINIC | Age: 74
End: 2024-04-10
Payer: COMMERCIAL

## 2024-04-10 VITALS
BODY MASS INDEX: 25.48 KG/M2 | WEIGHT: 178 LBS | HEART RATE: 64 BPM | OXYGEN SATURATION: 95 % | TEMPERATURE: 98 F | DIASTOLIC BLOOD PRESSURE: 60 MMHG | SYSTOLIC BLOOD PRESSURE: 118 MMHG | HEIGHT: 70 IN

## 2024-04-10 DIAGNOSIS — Z01.818 PREOP GENERAL PHYSICAL EXAM: Primary | ICD-10-CM

## 2024-04-10 DIAGNOSIS — H25.9 SENILE CATARACT, UNSPECIFIED AGE-RELATED CATARACT TYPE, UNSPECIFIED LATERALITY: ICD-10-CM

## 2024-04-10 PROCEDURE — 99214 OFFICE O/P EST MOD 30 MIN: CPT | Performed by: FAMILY MEDICINE

## 2024-04-10 ASSESSMENT — PATIENT HEALTH QUESTIONNAIRE - PHQ9: SUM OF ALL RESPONSES TO PHQ QUESTIONS 1-9: 3

## 2024-04-10 ASSESSMENT — PAIN SCALES - GENERAL: PAINLEVEL: NO PAIN (0)

## 2024-04-10 NOTE — PROGRESS NOTES
Preoperative Evaluation  [address] [address]  Phone: [phone]  Fax: [phone]  Primary Provider: Richard Daily  Pre-op Performing Provider: RICHARD DAILY  Apr 10, 2024       Drake is a 73 year old, presenting for the following:  Pre-Op Exam        4/10/2024     3:01 PM   Additional Questions   Roomed by Christine SUAREZ     Surgical Information  Surgery/Procedure: Cataract  Surgery Location:  [facility]  Surgeon: Dr. Galloway  Surgery Date: 4/12/2024  Time of Surgery: 10am  Where patient plans to recover: At home with family  Fax number for surgical facility: [phone]    Assessment & Plan     The proposed surgical procedure is considered LOW risk.      ICD-10-CM    1. Preop general physical exam  Z01.818       2. Senile cataract, unspecified age-related cataract type, unspecified laterality  H25.9             Possible Sleep Apnea:            - No identified additional risk factors other than previously addressed    Antiplatelet or Anticoagulation Medication Instructions   - Patient is on no antiplatelet or anticoagulation medications.    Additional Medication Instructions  Patient is to take all scheduled medications on the day of surgery    Recommendation  APPROVAL GIVEN to proceed with proposed procedure, without further diagnostic evaluation.          Subjective       HPI related to upcoming procedure: Chronic progressive bilateral cataracts        4/10/2024     2:50 PM   Preop Questions   1. Have you ever had a heart attack or stroke? No   2. Have you ever had surgery on your heart or blood vessels, such as a stent placement, a coronary artery bypass, or surgery on an artery in your head, neck, heart, or legs? No   3. Do you have chest pain with activity? No   4. Do you have a history of  heart failure? No   5. Do you currently have a cold, bronchitis or symptoms of other infection? No   6. Do you have a cough, shortness of breath,  or wheezing? YES - resolving cold   7. Do you or anyone in your family have previous history of blood clots? No   8. Do you or does anyone in your family have a serious bleeding problem such as prolonged bleeding following surgeries or cuts? No   9. Have you ever had problems with anemia or been told to take iron pills? No   10. Have you had any abnormal blood loss such as black, tarry or bloody stools? No   11. Have you ever had a blood transfusion? No   12. Are you willing to have a blood transfusion if it is medically needed before, during, or after your surgery? Yes   13. Have you or any of your relatives ever had problems with anesthesia? No   14. Do you have sleep apnea, excessive snoring or daytime drowsiness? YES - sleep study scheduled   14a. Do you have a CPAP machine? No   15. Do you have any artifical heart valves or other implanted medical devices like a pacemaker, defibrillator, or continuous glucose monitor? No   16. Do you have artificial joints? No   17. Are you allergic to latex? No       Health Care Directive  Patient does not have a Health Care Directive or Living Will: Discussed advance care planning with patient; however, patient declined at this time.    Preoperative Review of    reviewed - no record of controlled substances prescribed.      Status of Chronic Conditions:  See problem list for active medical problems.  Problems all longstanding and stable, except as noted/documented.  See ROS for pertinent symptoms related to these conditions.    Patient Active Problem List    Diagnosis Date Noted    Major depressive disorder, recurrent episode, mild (H24) 08/01/2018     Priority: Medium    ERRONEOUS ENCOUNTER--DISREGARD 09/27/2017     Priority: Medium    Benign prostatic hyperplasia with lower urinary tract symptoms 04/21/2017     Priority: Medium    Hyperlipidemia LDL goal <130 04/21/2017     Priority: Medium      Past Medical History:   Diagnosis Date    BPH (benign prostatic  hyperplasia)     Depressive disorder     Hyperlipidemia      Past Surgical History:   Procedure Laterality Date    HERNIORRHAPHY INGUINAL      REPAIR LACERATION LID Right 6/5/2017    Procedure: REPAIR LACERATION LID;  right lower lid laceration repair, right canalicular laceration repair      ;  Surgeon: Al Ac MD;  Location:  OR     Current Outpatient Medications   Medication Sig Dispense Refill    albuterol (PROAIR HFA/PROVENTIL HFA/VENTOLIN HFA) 108 (90 Base) MCG/ACT inhaler Inhale 1-2 puffs into the lungs every 6 hours as needed for shortness of breath, wheezing or cough 18 g 0    atorvastatin (LIPITOR) 20 MG tablet TAKE ONE TABLET BY MOUTH ONCE DAILY 30 tablet 4    citalopram (CELEXA) 40 MG tablet TAKE ONE TABLET BY MOUTH ONCE DAILY 30 tablet 5    cyanocobalamin (VITAMIN B-12) 1000 MCG tablet Take 1 tablet (1,000 mcg) by mouth daily 90 tablet 3    DIPHENHYDRAMINE HCL PO Take 50 mg by mouth nightly as needed      finasteride (PROSCAR) 5 MG tablet TAKE ONE TABLET BY MOUTH ONCE DAILY 90 tablet 0    IBUPROFEN PO       sildenafil (VIAGRA) 100 MG tablet TAKE ONE TABLET BY MOUTH ONCE DAILY AS NEEDED 12 tablet 0    tamsulosin (FLOMAX) 0.4 MG capsule TAKE ONE CAPSULE BY MOUTH TWICE A DAY 60 capsule 4    traZODone (DESYREL) 50 MG tablet TAKE ONE TABLET BY MOUTH AT BEDTIME 90 tablet 0       Allergies   Allergen Reactions    Bees Anaphylaxis    Penicillins Anaphylaxis        Social History     Tobacco Use    Smoking status: Former    Smokeless tobacco: Never   Substance Use Topics    Alcohol use: Yes     Comment: glass of wine occasionally     Family History   Problem Relation Age of Onset    Cerebrovascular Disease Mother     Dementia Mother     Glaucoma No family hx of     Macular Degeneration No family hx of      History   Drug Use No         Review of Systems    Review of Systems  CONSTITUTIONAL: NEGATIVE for fever, chills, change in weight  ENT/MOUTH: NEGATIVE for ear, mouth and throat problems  RESP:  "NEGATIVE for significant cough or SOB  CV: NEGATIVE for chest pain, palpitations or peripheral edema  ROS otherwise negative    Objective    /60   Pulse 64   Temp 98  F (36.7  C) (Temporal)   Ht 1.768 m (5' 9.61\")   Wt 80.7 kg (178 lb)   SpO2 95%   BMI 25.83 kg/m     Estimated body mass index is 25.83 kg/m  as calculated from the following:    Height as of this encounter: 1.768 m (5' 9.61\").    Weight as of this encounter: 80.7 kg (178 lb).  Physical Exam  GENERAL: alert and no distress  NECK: no adenopathy, no asymmetry, masses, or scars  RESP: lungs clear to auscultation - no rales, rhonchi or wheezes  CV: regular rate and rhythm, normal S1 S2, no S3 or S4, no murmur, click or rub, no peripheral edema  ABDOMEN: soft, nontender, no hepatosplenomegaly, no masses and bowel sounds normal  MS: no gross musculoskeletal defects noted, no edema    Recent Labs   Lab Test 01/26/24  1522 10/03/23  1144   HGB 13.5 14.1    260    140   POTASSIUM 4.4 4.2   CR 0.91 0.93        Diagnostics  No labs were ordered during this visit.   No EKG required for low risk surgery (cataract, skin procedure, breast biopsy, etc).    Revised Cardiac Risk Index (RCRI)  The patient has the following serious cardiovascular risks for perioperative complications:   - No serious cardiac risks = 0 points     RCRI Interpretation: 0 points: Class I (very low risk - 0.4% complication rate)         Signed Electronically by: Arsenio Oviedo MD  Copy of this evaluation report is provided to requesting physician.         "

## 2024-04-12 ENCOUNTER — TELEPHONE (OUTPATIENT)
Dept: SLEEP MEDICINE | Facility: CLINIC | Age: 74
End: 2024-04-12

## 2024-04-22 SDOH — HEALTH STABILITY: PHYSICAL HEALTH: ON AVERAGE, HOW MANY MINUTES DO YOU ENGAGE IN EXERCISE AT THIS LEVEL?: 10 MIN

## 2024-04-22 SDOH — HEALTH STABILITY: PHYSICAL HEALTH: ON AVERAGE, HOW MANY DAYS PER WEEK DO YOU ENGAGE IN MODERATE TO STRENUOUS EXERCISE (LIKE A BRISK WALK)?: 2 DAYS

## 2024-04-22 ASSESSMENT — SOCIAL DETERMINANTS OF HEALTH (SDOH): HOW OFTEN DO YOU GET TOGETHER WITH FRIENDS OR RELATIVES?: ONCE A WEEK

## 2024-04-23 ENCOUNTER — OFFICE VISIT (OUTPATIENT)
Dept: AUDIOLOGY | Facility: CLINIC | Age: 74
End: 2024-04-23
Payer: COMMERCIAL

## 2024-04-23 DIAGNOSIS — H90.3 SENSORINEURAL HEARING LOSS, BILATERAL: Primary | ICD-10-CM

## 2024-04-23 PROCEDURE — 92550 TYMPANOMETRY & REFLEX THRESH: CPT | Performed by: AUDIOLOGIST

## 2024-04-23 PROCEDURE — 92557 COMPREHENSIVE HEARING TEST: CPT | Performed by: AUDIOLOGIST

## 2024-04-23 NOTE — PROGRESS NOTES
AUDIOLOGY REPORT    BACKGROUND:  Self-referred for concern regarding decreased hearing, more challenging hearing women than men, and noticing more difficulty hearing on the phone. His last hearing test 3/29/2018 showed low normal sloping to mild to moderate sensorineural hearing loss right ear and low normal sloping to mild to moderate to severe sensorineural hearing loss left ear. hearing loss left ear. He denied tinnitus. He noted onset of dizziness/vertigo. Does not feel steady on his feet. Was evaluated in neurology, MRI/CT no abnormal findings per patient report.    RESULTS:  Otoscopy showed visible eardrums and landmarks bilaterally, after moderate non-occluding wax removed bilaterally without incident using a lighted curette. Tympanograms showed normal eardrum mobility right ear and left ear. Ipsi and contra acoustic reflexes present right and left ears. Pure tones show mild sloping to moderate sensorineural hearing loss right ear and mild sloping to severe sensorineural hearing loss left ear. Good SRT/PTA agreement. Good word understanding in quiet, 100% right and 96% left, 25-word list.    RECOMMENDATIONS:  ENT consultation and potentially a balance assessment, given new vertigo symptoms.   Return in 1-2 years for hearing testing, sooner if there are changes in hearing, tinnitus, or balance.        Robin Geronimo Licensed Audiologist #[id number]

## 2024-04-25 ENCOUNTER — OFFICE VISIT (OUTPATIENT)
Dept: FAMILY MEDICINE | Facility: CLINIC | Age: 74
End: 2024-04-25
Payer: COMMERCIAL

## 2024-04-25 VITALS
HEIGHT: 69 IN | SYSTOLIC BLOOD PRESSURE: 110 MMHG | DIASTOLIC BLOOD PRESSURE: 64 MMHG | WEIGHT: 175 LBS | HEART RATE: 56 BPM | TEMPERATURE: 98.1 F | RESPIRATION RATE: 12 BRPM | BODY MASS INDEX: 25.92 KG/M2 | OXYGEN SATURATION: 98 %

## 2024-04-25 DIAGNOSIS — Z00.00 ENCOUNTER FOR MEDICARE ANNUAL WELLNESS EXAM: ICD-10-CM

## 2024-04-25 DIAGNOSIS — R63.4 WEIGHT LOSS: ICD-10-CM

## 2024-04-25 DIAGNOSIS — R05.2 SUBACUTE COUGH: ICD-10-CM

## 2024-04-25 DIAGNOSIS — Z11.59 NEED FOR HEPATITIS C SCREENING TEST: Primary | ICD-10-CM

## 2024-04-25 DIAGNOSIS — N52.9 ERECTILE DYSFUNCTION, UNSPECIFIED ERECTILE DYSFUNCTION TYPE: ICD-10-CM

## 2024-04-25 DIAGNOSIS — F33.0 MAJOR DEPRESSIVE DISORDER, RECURRENT EPISODE, MILD (H): ICD-10-CM

## 2024-04-25 DIAGNOSIS — E78.5 HYPERLIPIDEMIA LDL GOAL <130: ICD-10-CM

## 2024-04-25 DIAGNOSIS — N40.1 BENIGN PROSTATIC HYPERPLASIA WITH URINARY FREQUENCY: ICD-10-CM

## 2024-04-25 DIAGNOSIS — N40.1 BENIGN PROSTATIC HYPERPLASIA WITH LOWER URINARY TRACT SYMPTOMS, SYMPTOM DETAILS UNSPECIFIED: ICD-10-CM

## 2024-04-25 DIAGNOSIS — G47.00 INSOMNIA, UNSPECIFIED TYPE: ICD-10-CM

## 2024-04-25 DIAGNOSIS — R63.0 ANOREXIA: ICD-10-CM

## 2024-04-25 DIAGNOSIS — R35.0 BENIGN PROSTATIC HYPERPLASIA WITH URINARY FREQUENCY: ICD-10-CM

## 2024-04-25 DIAGNOSIS — Z13.6 ENCOUNTER FOR SCREENING FOR ABDOMINAL AORTIC ANEURYSM (AAA) IN PATIENT 50 YEARS OF AGE OR OLDER WITHOUT OTHER RISK FACTORS FOR AAA: ICD-10-CM

## 2024-04-25 PROCEDURE — 99214 OFFICE O/P EST MOD 30 MIN: CPT | Mod: 25 | Performed by: FAMILY MEDICINE

## 2024-04-25 PROCEDURE — G0439 PPPS, SUBSEQ VISIT: HCPCS | Performed by: FAMILY MEDICINE

## 2024-04-25 RX ORDER — TRAZODONE HYDROCHLORIDE 50 MG/1
50 TABLET, FILM COATED ORAL AT BEDTIME
Qty: 90 TABLET | Refills: 3 | Status: SHIPPED | OUTPATIENT
Start: 2024-04-25

## 2024-04-25 RX ORDER — TAMSULOSIN HYDROCHLORIDE 0.4 MG/1
0.4 CAPSULE ORAL 2 TIMES DAILY
Qty: 180 CAPSULE | Refills: 3 | Status: SHIPPED | OUTPATIENT
Start: 2024-04-25

## 2024-04-25 RX ORDER — CITALOPRAM HYDROBROMIDE 40 MG/1
40 TABLET ORAL DAILY
Qty: 90 TABLET | Refills: 4 | Status: SHIPPED | OUTPATIENT
Start: 2024-04-25

## 2024-04-25 RX ORDER — ALBUTEROL SULFATE 90 UG/1
1-2 AEROSOL, METERED RESPIRATORY (INHALATION) EVERY 6 HOURS PRN
Qty: 18 G | Refills: 3 | Status: SHIPPED | OUTPATIENT
Start: 2024-04-25

## 2024-04-25 RX ORDER — FINASTERIDE 5 MG/1
1 TABLET, FILM COATED ORAL DAILY
Qty: 90 TABLET | Refills: 3 | Status: SHIPPED | OUTPATIENT
Start: 2024-04-25

## 2024-04-25 RX ORDER — SILDENAFIL 100 MG/1
100 TABLET, FILM COATED ORAL DAILY PRN
Qty: 12 TABLET | Refills: 1 | Status: SHIPPED | OUTPATIENT
Start: 2024-04-25

## 2024-04-25 RX ORDER — ATORVASTATIN CALCIUM 20 MG/1
20 TABLET, FILM COATED ORAL DAILY
Qty: 90 TABLET | Refills: 4 | Status: SHIPPED | OUTPATIENT
Start: 2024-04-25

## 2024-04-25 ASSESSMENT — PATIENT HEALTH QUESTIONNAIRE - PHQ9
10. IF YOU CHECKED OFF ANY PROBLEMS, HOW DIFFICULT HAVE THESE PROBLEMS MADE IT FOR YOU TO DO YOUR WORK, TAKE CARE OF THINGS AT HOME, OR GET ALONG WITH OTHER PEOPLE: SOMEWHAT DIFFICULT
SUM OF ALL RESPONSES TO PHQ QUESTIONS 1-9: 7
SUM OF ALL RESPONSES TO PHQ QUESTIONS 1-9: 7

## 2024-04-25 ASSESSMENT — PAIN SCALES - GENERAL: PAINLEVEL: NO PAIN (0)

## 2024-04-25 NOTE — PROGRESS NOTES
Preventive Care Visit  Formerly [facility]  Arsenio Oviedo MD, Family Medicine  Apr 25, 2024      Assessment & Plan     Encounter for Medicare annual wellness exam  Drake is a 73-year-old male who presents to clinic for his Medicare annual wellness visit.  In general his health is good but he has concerns about anorexia and weight loss.  He states that over the last several months he has had no appetite.  He has been seen by several providers and over the last 3 months with normal lab workup.    His past medical history is pertinent for depression, benign prostatic hypertrophy, hyperlipidemia.  He denies any nausea or vomiting associated with his anorexia.  He states that his sense of smell is intact and his sense of taste is intact.  He denies any abdominal pain bloating or reflux.  He denies any constipation, diarrhea or change in bowel form or function.    All age and gender specific screening recommendations were reviewed.  His last colonoscopy was in 2015 and was completely normal without polyps.  He is on a 10-year recommended follow-up.  He has not smoked in 25 years and has not a candidate for lung cancer screening.  He has not had abdominal aortic aneurysm screening but is interested in this.    All recommended vaccines and schedules were reviewed.  He declines COVID vaccination at this time but will consider it again this fall.  His last vaccine was November 2023.  We reviewed his risk for pneumonia.  I recommended Prevnar 20 to be completed through his local pharmacy.  I also recommended shingles to be completed through his local pharmacy.    On exam this is a well-appearing middle-aged male in no acute distress.  His blood pressure is 110/64.  His pulse is 56 and regular.  Oxygen saturations 98% on room air.  His body mass index is 25.6.  HEENT exam is unremarkable.  Neck is supple without carotid bruit or thyromegaly.  There is no adenopathy.  Lungs are clear without wheezing,  rales or rhonchi.  Cardiac exam is regular without murmur, rub or gallop.  Abdomen is soft, nondistended nontender.  Bowel sounds are present throughout.  No palpable organomegaly.  No palpable pulsatile masses.    Neurologic exam is nonfocal.    Will obtain CT abdomen and pelvis for evaluation of unexplained spleen weight loss and anorexia.  He has had no imaging to date.  No additional labs indicated.  CT abdomen and pelvis will also evaluate abdominal aortic aneurysm status.    Hyperlipidemia LDL goal <130  Chronic, stable.  Lipid profile will be obtained at next lab draw.  His last lipid profile 1 year ago was excellent.  Continue current medications and plan.  - Lipid panel reflex to direct LDL Fasting; Future  - atorvastatin (LIPITOR) 20 MG tablet; Take 1 tablet (20 mg) by mouth daily    Major depressive disorder, recurrent episode, mild (H24)  Chronic, stable.  Continue current medication and plan.  - citalopram (CELEXA) 40 MG tablet; Take 1 tablet (40 mg) by mouth daily    Benign prostatic hyperplasia with urinary frequency  Chronic, stable.  Continue medication and plan with Proscar 5 mg once daily and Flomax 0.4 mg twice daily  - finasteride (PROSCAR) 5 MG tablet; Take 1 tablet (5 mg) by mouth daily  - tamsulosin (FLOMAX) 0.4 MG capsule; Take 1 capsule (0.4 mg) by mouth 2 times daily    Insomnia, unspecified type  Chronic, stable.  Continue trazodone 50 mg at bedtime.  He also does a THC gummy at bedtime.  I suggested that he may want to try half a THC gummy midmorning to see if this helps stimulate his appetite.  - traZODone (DESYREL) 50 MG tablet; Take 1 tablet (50 mg) by mouth at bedtime    Erectile dysfunction, unspecified erectile dysfunction type  Chronic, stable.  - sildenafil (VIAGRA) 100 MG tablet; Take 1 tablet (100 mg) by mouth daily as needed    Anorexia  Persistent weight loss and loss of hunger and appetite over the last several months.  Metabolic workup at this point has been nondiagnostic.   "Will obtain CT abdomen and pelvis for further evaluation.  In the meantime encouraged high caloric foods.  I recommended one half of the THC gummy midmorning to see if this helps stimulate his appetite.  - CT Abdomen Pelvis w/o Contrast; Future    Weight loss  Persistent weight loss and loss of hunger and appetite over the last several months.  Metabolic workup at this point has been nondiagnostic.  Will obtain CT abdomen and pelvis for further evaluation.  In the meantime encouraged high caloric foods.  I recommended one half of the THC gummy midmorning to see if this helps stimulate his appetite.  - CT Abdomen Pelvis w/o Contrast; Future    Subacute cough  Chronic, stable.  Continue current medication as needed  - albuterol (PROAIR HFA/PROVENTIL HFA/VENTOLIN HFA) 108 (90 Base) MCG/ACT inhaler; Inhale 1-2 puffs into the lungs every 6 hours as needed for shortness of breath, wheezing or cough    Need for hepatitis C screening test    - Hepatitis C Screen Reflex to HCV RNA Quant and Genotype; Future    Encounter for screening for abdominal aortic aneurysm (AAA) in patient 50 years of age or older without other risk factors for AAA      Patient has been advised of split billing requirements and indicates understanding: Yes  Ordering of each unique test  Prescription drug management        BMI  Estimated body mass index is 25.66 kg/m  as calculated from the following:    Height as of this encounter: 1.759 m (5' 9.25\").    Weight as of this encounter: 79.4 kg (175 lb).       Counseling  Appropriate preventive services were discussed with this patient, including applicable screening as appropriate for fall prevention, nutrition, physical activity, Tobacco-use cessation, weight loss and cognition.  Checklist reviewing preventive services available has been given to the patient.  Reviewed patient's diet, addressing concerns and/or questions.   He is at risk for lack of exercise and has been provided with information to " increase physical activity for the benefit of his well-being.   He is at risk for psychosocial distress and has been provided with information to reduce risk.   The patient was provided with written information regarding signs of hearing loss.   The patient's PHQ-9 score is consistent with mild depression. He was provided with information regarding depression.       FURTHER TESTING:       - CT abdomen and pelvis  Regular exercise   Follow-up Visit   Expected date:  May 02, 2025 (Approximate)      Follow Up Appointment Details:     Follow-up with whom?: PCP    Follow-Up for what?: Medicare Wellness    Welcome or Annual?: Annual Wellness    How?: In Person                     Flakita Juan is a 73 year old, presenting for the following:  Wellness Visit        4/25/2024     1:23 PM   Additional Questions   Roomed by [facility]         Health Care Directive  Patient does not have a Health Care Directive or Living Will: Discussed advance care planning with patient; information given to patient to review.    HPI      Hyperlipidemia Follow-Up    Are you regularly taking any medication or supplement to lower your cholesterol?   Yes- Lipitor  Are you having muscle aches or other side effects that you think could be caused by your cholesterol lowering medication?  No    Depression   How are you doing with your depression since your last visit? No change  Are you having other symptoms that might be associated with depression? No  Have you had a significant life event?  No   Are you feeling anxious or having panic attacks?   No  Do you have any concerns with your use of alcohol or other drugs? No    Social History     Tobacco Use    Smoking status: Former     Passive exposure: Never    Smokeless tobacco: Never   Vaping Use    Vaping status: Never Used   Substance Use Topics    Alcohol use: Yes     Comment: glass of wine occasionally    Drug use: No         1/26/2024     2:33 PM 4/10/2024     3:06 PM 4/25/2024    10:00  AM   PHQ   PHQ-9 Total Score 7 3 7   Q9: Thoughts of better off dead/self-harm past 2 weeks Not at all Not at all Not at all         8/1/2018     9:41 AM 3/15/2021     2:04 PM 4/25/2022     8:18 AM   BRAN-7 SCORE   Total Score 16 1 9         4/25/2024    10:00 AM   Last PHQ-9   1.  Little interest or pleasure in doing things 1   2.  Feeling down, depressed, or hopeless 1   3.  Trouble falling or staying asleep, or sleeping too much 1   4.  Feeling tired or having little energy 1   5.  Poor appetite or overeating 3   6.  Feeling bad about yourself 0   7.  Trouble concentrating 0   8.  Moving slowly or restless 0   Q9: Thoughts of better off dead/self-harm past 2 weeks 0   PHQ-9 Total Score 7         4/25/2022     8:18 AM   BRAN-7    1. Feeling nervous, anxious, or on edge 1   2. Not being able to stop or control worrying 1   3. Worrying too much about different things 1   4. Trouble relaxing 3   5. Being so restless that it is hard to sit still 1   6. Becoming easily annoyed or irritable 2   7. Feeling afraid, as if something awful might happen 0   BRAN-7 Total Score 9   If you checked any problems, how difficult have they made it for you to do your work, take care of things at home, or get along with other people? Somewhat difficult       Suicide Assessment Five-step Evaluation and Treatment (SAFE-T)          4/22/2024   General Health   How would you rate your overall physical health? Good   Feel stress (tense, anxious, or unable to sleep) Only a little   (!) STRESS CONCERN      4/22/2024   Nutrition   Diet: Regular (no restrictions)         4/22/2024   Exercise   Days per week of moderate/strenous exercise 2 days   Average minutes spent exercising at this level 10 min   (!) EXERCISE CONCERN      4/22/2024   Social Factors   Frequency of gathering with friends or relatives Once a week   Worry food won't last until get money to buy more No   Food not last or not have enough money for food? No   Do you have housing?   Yes   Are you worried about losing your housing? No   Lack of transportation? No   Unable to get utilities (heat,electricity)? No         4/22/2024   Fall Risk   Fallen 2 or more times in the past year? Yes   Trouble with walking or balance? Yes           4/22/2024   Activities of Daily Living- Home Safety   Needs help with the following daily activites None of the above   Safety concerns in the home None of the above         4/22/2024   Dental   Dentist two times every year? Yes         4/22/2024   Hearing Screening   Hearing concerns? (!) I NEED TO ASK PEOPLE TO SPEAK UP OR REPEAT THEMSELVES.    (!) IT'S HARDER TO UNDERSTAND WOMEN'S VOICES THAN MEN'S VOICES.    (!) IT'S HARD TO FOLLOW A CONVERSATION IN A NOISY RESTAURANT OR CROWDED ROOM.         4/22/2024   Driving Risk Screening   Patient/family members have concerns about driving No         4/22/2024   General Alertness/Fatigue Screening   Have you been more tired than usual lately? No         4/22/2024   Urinary Incontinence Screening   Bothered by leaking urine in past 6 months No         4/22/2024   TB Screening   Were you born outside of the [place]? No       Today's PHQ-9 Score:       4/25/2024    10:00 AM   PHQ-9 SCORE   PHQ-9 Total Score MyChart 7 (Mild depression)   PHQ-9 Total Score 7         4/22/2024   Substance Use   Alcohol more than 3/day or more than 7/wk No   Do you have a current opioid prescription? No   How severe/bad is pain from 1 to 10? 0/10 (No Pain)   Do you use any other substances recreationally? (!) CANNABIS PRODUCTS         Social History     Tobacco Use    Smoking status: Former    Smokeless tobacco: Never   Vaping Use    Vaping status: Never Used   Substance Use Topics    Alcohol use: Yes     Comment: glass of wine occasionally    Drug use: No       ASCVD Risk   The 10-year ASCVD risk score (Manoj PEDERSEN, et al., 2019) is: 13.4%    Values used to calculate the score:      Age: 73 years      Sex: Male      Is Non-   American: No      Diabetic: No      Tobacco smoker: No      Systolic Blood Pressure: 110 mmHg      Is BP treated: No      HDL Cholesterol: 54 mg/dL      Total Cholesterol: 165 mg/dL            Reviewed and updated as needed this visit by Provider                    Lab work is in process  Labs reviewed in EPIC  BP Readings from Last 3 Encounters:   04/25/24 110/64   04/10/24 118/60   01/26/24 114/64    Wt Readings from Last 3 Encounters:   04/25/24 79.4 kg (175 lb)   04/10/24 80.7 kg (178 lb)   01/26/24 73.3 kg (161 lb 8 oz)                  Patient Active Problem List   Diagnosis    Benign prostatic hyperplasia with lower urinary tract symptoms    Hyperlipidemia LDL goal <130    ERRONEOUS ENCOUNTER--DISREGARD    Major depressive disorder, recurrent episode, mild (H24)     Past Surgical History:   Procedure Laterality Date    HERNIORRHAPHY INGUINAL      REPAIR LACERATION LID Right 6/5/2017    Procedure: REPAIR LACERATION LID;  right lower lid laceration repair, right canalicular laceration repair      ;  Surgeon: Al Ac MD;  Location:  OR       Social History     Tobacco Use    Smoking status: Former    Smokeless tobacco: Never   Substance Use Topics    Alcohol use: Yes     Comment: glass of wine occasionally     Family History   Problem Relation Age of Onset    Cerebrovascular Disease Mother     Dementia Mother     Glaucoma No family hx of     Macular Degeneration No family hx of          Current Outpatient Medications   Medication Sig Dispense Refill    albuterol (PROAIR HFA/PROVENTIL HFA/VENTOLIN HFA) 108 (90 Base) MCG/ACT inhaler Inhale 1-2 puffs into the lungs every 6 hours as needed for shortness of breath, wheezing or cough 18 g 0    atorvastatin (LIPITOR) 20 MG tablet TAKE ONE TABLET BY MOUTH ONCE DAILY 30 tablet 4    citalopram (CELEXA) 40 MG tablet TAKE ONE TABLET BY MOUTH ONCE DAILY 30 tablet 5    cyanocobalamin (VITAMIN B-12) 1000 MCG tablet Take 1 tablet (1,000 mcg) by mouth daily 90 tablet  3    DIPHENHYDRAMINE HCL PO Take 50 mg by mouth nightly as needed      finasteride (PROSCAR) 5 MG tablet TAKE ONE TABLET BY MOUTH ONCE DAILY 90 tablet 0    IBUPROFEN PO       sildenafil (VIAGRA) 100 MG tablet TAKE ONE TABLET BY MOUTH ONCE DAILY AS NEEDED 12 tablet 0    tamsulosin (FLOMAX) 0.4 MG capsule TAKE ONE CAPSULE BY MOUTH TWICE A DAY 60 capsule 4    traZODone (DESYREL) 50 MG tablet TAKE ONE TABLET BY MOUTH AT BEDTIME 90 tablet 0     Allergies   Allergen Reactions    Bees Anaphylaxis    Penicillins Anaphylaxis     Recent Labs   Lab Test 01/26/24  1522 10/03/23  1144 04/25/22  0911 04/25/22  0911 03/15/21  1438 01/10/20  0926   LDL  --   --   --  91 105* 102*   HDL  --   --   --  54 53 55   TRIG  --   --   --  101 125 95   ALT 15 18  --  24 32 21   CR 0.91 0.93   < > 1.01 0.96 0.95   GFRESTIMATED 90 88   < > 81 80 83   GFRESTBLACK  --   --   --   --  >90 >90   POTASSIUM 4.4 4.2  --  3.6 4.2 4.1   TSH 0.94 0.72  --   --   --   --     < > = values in this interval not displayed.      Current providers sharing in care for this patient include:  Patient Care Team:  Arsenio Oviedo MD as PCP - General (Family Medicine)  Arsenio Oviedo MD as Assigned PCP  Mitchel Briones MD as MD (Neurology)  Mitchel Briones MD as Assigned Neuroscience Provider  Beatriz Fisher AuD as Audiologist (Audiology)    The following health maintenance items are reviewed in Epic and correct as of today:  Health Maintenance   Topic Date Due    HEPATITIS C SCREENING  Never done    ZOSTER IMMUNIZATION (1 of 2) Never done    LUNG CANCER SCREENING  Never done    Pneumococcal Vaccine: 65+ Years (1 of 1 - PCV) Never done    AORTIC ANEURYSM SCREENING (SYSTEM ASSIGNED)  Never done    MEDICARE ANNUAL WELLNESS VISIT  04/25/2023    LIPID  04/25/2023    COVID-19 Vaccine (6 - 2023-24 season) 03/28/2024    PHQ-9  10/25/2024    ANNUAL REVIEW OF HM ORDERS  04/10/2025    FALL RISK ASSESSMENT  04/25/2025    COLORECTAL CANCER SCREENING  08/04/2025     "GLUCOSE  01/26/2027    ADVANCE CARE PLANNING  04/25/2027    DTAP/TDAP/TD IMMUNIZATION (3 - Td or Tdap) 08/05/2030    DEPRESSION ACTION PLAN  Completed    INFLUENZA VACCINE  Completed    RSV VACCINE (Pregnancy & 60+)  Completed    IPV IMMUNIZATION  Aged Out    HPV IMMUNIZATION  Aged Out    MENINGITIS IMMUNIZATION  Aged Out    RSV MONOCLONAL ANTIBODY  Aged Out         Review of Systems  CONSTITUTIONAL:POSITIVE  for anorexia and weight loss and NEGATIVE  for arthralgias, chills, fatigue, malaise, myalgias, and sweats  ENT/MOUTH: NEGATIVE for ear, mouth and throat problems  RESP: NEGATIVE for significant cough or SOB  CV: NEGATIVE for chest pain, palpitations or peripheral edema  GI: POSITIVE for poor appetite and weight loss and NEGATIVE for abdominal pain , constipation, diarrhea, dyspepsia, dysphagia, gas or bloating, heartburn or reflux, hematemesis, hematochezia, Hx colon polyps, Hx gallbladder trouble, Hx gastritis, Hx IBD, Hx IBS, jaundice, melena, nausea, and vomiting  PSYCHIATRIC: POSITIVE foranhedonia, Hx depression, insomnia chronic, and weight loss and NEGATIVE foragitation, alcohol abuse, and thoughts of self harm  ROS otherwise negative     Objective    Exam  /64 (BP Location: Right arm, Patient Position: Chair, Cuff Size: Adult Regular)   Pulse 56   Temp 98.1  F (36.7  C) (Temporal)   Resp 12   Ht 1.759 m (5' 9.25\")   Wt 79.4 kg (175 lb)   SpO2 98%   BMI 25.66 kg/m     Estimated body mass index is 25.66 kg/m  as calculated from the following:    Height as of this encounter: 1.759 m (5' 9.25\").    Weight as of this encounter: 79.4 kg (175 lb).    Physical Exam  GENERAL: alert and no distress  EYES: Eyes grossly normal to inspection, PERRL and conjunctivae and sclerae normal  HENT: ear canals and TM's normal, nose and mouth without ulcers or lesions  NECK: no adenopathy, no asymmetry, masses, or scars  RESP: lungs clear to auscultation - no rales, rhonchi or wheezes  CV: regular rate and " rhythm, normal S1 S2, no S3 or S4, no murmur, click or rub, no peripheral edema  ABDOMEN: soft, nontender, no hepatosplenomegaly, no masses and bowel sounds normal  MS: no gross musculoskeletal defects noted, no edema  NEURO: Normal strength and tone, mentation intact and speech normal  PSYCH: mentation appears normal, affect normal/bright  LYMPH: no cervical, supraclavicular, axillary, or inguinal adenopathy        4/25/2024   Mini Cog   Clock Draw Score 2 Normal   3 Item Recall 3 objects recalled   Mini Cog Total Score 5              Signed Electronically by: Arsenio Oviedo MD    Answers submitted by the patient for this visit:  Patient Health Questionnaire (Submitted on 4/25/2024)  If you checked off any problems, how difficult have these problems made it for you to do your work, take care of things at home, or get along with other people?: Somewhat difficult  PHQ9 TOTAL SCORE: 7

## 2024-04-25 NOTE — PATIENT INSTRUCTIONS
Preventive Care Advice   This is general advice given by our system to help you stay healthy. However, your care team may have specific advice just for you. Please talk to your care team about your preventive care needs.  Nutrition  Eat 5 or more servings of fruits and vegetables each day.  Try wheat bread, brown rice and whole grain pasta (instead of white bread, rice, and pasta).  Get enough calcium and vitamin D. Check the label on foods and aim for 100% of the RDA (recommended daily allowance).  Lifestyle  Exercise at least 150 minutes each week   (30 minutes a day, 5 days a week).  Do muscle strengthening activities 2 days a week. These help control your weight and prevent disease.  No smoking.  Wear sunscreen to prevent skin cancer.  Have a dental exam and cleaning every 6 months.  Yearly exams  See your health care team every year to talk about:  Any changes in your health.  Any medicines your care team has prescribed.  Preventive care, family planning, and ways to prevent chronic diseases.  Shots (vaccines)   HPV shots (up to age 26), if you've never had them before.  Hepatitis B shots (up to age 59), if you've never had them before.  COVID-19 shot: Get this shot when it's due.  Flu shot: Get a flu shot every year.  Tetanus shot: Get a tetanus shot every 10 years.  Pneumococcal, hepatitis A, and RSV shots: Ask your care team if you need these based on your risk.  Shingles shot (for age 50 and up).  General health tests  Diabetes screening:  Starting at age 35, Get screened for diabetes at least every 3 years.  If you are younger than age 35, ask your care team if you should be screened for diabetes.  Cholesterol test: At age 39, start having a cholesterol test every 5 years, or more often if advised.  Bone density scan (DEXA): At age 50, ask your care team if you should have this scan for osteoporosis (brittle bones).  Hepatitis C: Get tested at least once in your life.  STIs (sexually transmitted  infections)  Before age 24: Ask your care team if you should be screened for STIs.  After age 24: Get screened for STIs if you're at risk. You are at risk for STIs (including HIV) if:  You are sexually active with more than one person.  You don't use condoms every time.  You or a partner was diagnosed with a sexually transmitted infection.  If you are at risk for HIV, ask about PrEP medicine to prevent HIV.  Get tested for HIV at least once in your life, whether you are at risk for HIV or not.  Cancer screening tests  Cervical cancer screening: If you have a cervix, begin getting regular cervical cancer screening tests at age 21. Most people who have regular screenings with normal results can stop after age 65. Talk about this with your provider.  Breast cancer scan (mammogram): If you've ever had breasts, begin having regular mammograms starting at age 40. This is a scan to check for breast cancer.  Colon cancer screening: It is important to start screening for colon cancer at age 45.  Have a colonoscopy test every 10 years (or more often if you're at risk) Or, ask your provider about stool tests like a FIT test every year or Cologuard test every 3 years.  To learn more about your testing options, visit: [email or web address].  For help making a decision, visit: https://bit.ly/vj93523.  Prostate cancer screening test: If you have a prostate and are age 55 to 69, ask your provider if you would benefit from a yearly prostate cancer screening test.  Lung cancer screening: If you are a current or former smoker age 50 to 80, ask your care team if ongoing lung cancer screenings are right for you.  For informational purposes only. Not to replace the advice of your health care provider. Copyright   2023 [employer]. All rights reserved. Clinically reviewed by the [facility] Transitions Program. [employer] [id number] - REV 01/24.    Learning About Being Active as an Older Adult  Why is being  active important as you get older?     Being active is one of the best things you can do for your health. And it's never too late to start. Being active--or getting active, if you aren't already--has definite benefits. It can:  Give you more energy,  Keep your mind sharp.  Improve balance to reduce your risk of falls.  Help you manage chronic illness with fewer medicines.  No matter how old you are, how fit you are, or what health problems you have, there is a form of activity that will work for you. And the more physical activity you can do, the better your overall health will be.  What kinds of activity can help you stay healthy?  Being more active will make your daily activities easier. Physical activity includes planned exercise and things you do in daily life. There are four types of activity:  Aerobic.  Doing aerobic activity makes your heart and lungs strong.  Includes walking, dancing, and gardening.  Aim for at least 2  hours spread throughout the week.  It improves your energy and can help you sleep better.  Muscle-strengthening.  This type of activity can help maintain muscle and strengthen bones.  Includes climbing stairs, using resistance bands, and lifting or carrying heavy loads.  Aim for at least twice a week.  It can help protect the knees and other joints.  Stretching.  Stretching gives you better range of motion in joints and muscles.  Includes upper arm stretches, calf stretches, and gentle yoga.  Aim for at least twice a week, preferably after your muscles are warmed up from other activities.  It can help you function better in daily life.  Balancing.  This helps you stay coordinated and have good posture.  Includes heel-to-toe walking, jamaal chi, and certain types of yoga.  Aim for at least 3 days a week.  It can reduce your risk of falling.  Even if you have a hard time meeting the recommendations, it's better to be more active than less active. All activity done in each category counts toward  your weekly total. You'd be surprised how daily things like carrying groceries, keeping up with grandchildren, and taking the stairs can add up.  What keeps you from being active?  If you've had a hard time being more active, you're not alone. Maybe you remember being able to do more. Or maybe you've never thought of yourself as being active. It's frustrating when you can't do the things you want. Being more active can help. What's holding you back?  Getting started.  Have a goal, but break it into easy tasks. Small steps build into big accomplishments.  Staying motivated.  If you feel like skipping your activity, remember your goal. Maybe you want to move better and stay independent. Every activity gets you one step closer.  Not feeling your best.  Start with 5 minutes of an activity you enjoy. Prove to yourself you can do it. As you get comfortable, increase your time.  You may not be where you want to be. But you're in the process of getting there. Everyone starts somewhere.  How can you find safe ways to stay active?  Talk with your doctor about any physical challenges you're facing. Make a plan with your doctor if you have a health problem or aren't sure how to get started with activity.  If you're already active, ask your doctor if there is anything you should change to stay safe as your body and health change.  If you tend to feel dizzy after you take medicine, avoid activity at that time. Try being active before you take your medicine. This will reduce your risk of falls.  If you plan to be active at home, make sure to clear your space before you get started. Remove things like TV cords, coffee tables, and throw rugs. It's safest to have plenty of space to move freely.  The key to getting more active is to take it slow and steady. Try to improve only a little bit at a time. Pick just one area to improve on at first. And if an activity hurts, stop and talk to your doctor.  Where can you learn more?  Go to  "https://www.[employer].net/patiented  Enter P600 in the search box to learn more about \"Learning About Being Active as an Older Adult.\"  Current as of: June 5, 2023               Content Version: 14.0    0653-9210 [email or web address].   Care instructions adapted under license by your healthcare professional. If you have questions about a medical condition or this instruction, always ask your healthcare professional. [email or web address] disclaims any warranty or liability for your use of this information.      Nutrition for Older Adults: Care Instructions  Overview     Good nutrition is important at any age. But it is especially important for older adults. Eating healthy foods helps keep your body strong. And it can help lower your risk for disease.  As you get older, your body needs more of certain nutrients. These include vitamin B12, calcium, and vitamin D. But it may be harder for you to get these and other important nutrients. This could be for many reasons. You may not feel as hungry as you used to. Or you could have problems with your teeth or mouth that make it hard to chew. Or you may not enjoy planning and preparing meals, especially if you live alone.  Talk with your doctor if you want help getting the most nutrition from what you eat. They may have you work with a dietitian to help you plan meals.  Follow-up care is a key part of your treatment and safety. Be sure to make and go to all appointments, and call your doctor if you are having problems. It's also a good idea to know your test results and keep a list of the medicines you take.  How can you care for yourself at home?  To stay healthy  Eat a variety of foods. The more you vary the foods you eat, the more vitamins, minerals, and other nutrients you get.  Ask your doctor if you should take a multivitamin. Choose one with about 100% of the daily value (DV) for vitamins and minerals. Do not take more than 100% of the daily value for any " vitamin or mineral unless your doctor tells you to. Talk with your doctor if you are not sure which multivitamin is right for you.  Try to eat lots of fruits and vegetables. Fresh or frozen vegetables and fruits are healthy choices. Choose canned vegetables that have no salt added and fruits that are canned in their own juice or light syrup.  Include foods that are high in vitamin B12 in your diet. Good choices are fortified breakfast cereal, nonfat or low-fat milk and other dairy products, meat, poultry, fish, and eggs.  Get enough calcium and vitamin D. Good choices include nonfat or low-fat milk, cheese, and yogurt. Other good options are tofu, orange juice with added calcium, and some leafy green vegetables, such as jv greens and kale. If you don't use milk products, talk to your doctor about calcium and vitamin D supplements.  Try to eat protein foods every day. Good choices include lean meat, fish, poultry, eggs, and cheese. Other good options are cooked beans, peanut butter, and nuts and seeds.  Choose whole grains for half of the grains you eat. Look for 100% whole wheat bread, whole-grain cereals, brown rice, and other whole grains.  If you have constipation  Eat high-fiber foods every day if you can. These include fruits, vegetables, cooked dried beans, and whole grains.  Drink plenty of fluids. If you have kidney, heart, or liver disease and have to limit fluids, talk with your doctor before you increase the amount of fluids you drink.  Ask your doctor if stool softeners may help keep your bowels regular.  If you have mouth problems that make chewing hard  Pick canned or cooked fruits and vegetables. These are often softer.  Chop or shred meat, poultry, and fish. Add sauce or gravy to the meat to help keep it moist.  Pick other protein foods that are soft. These include cheese, peanut butter, cooked beans, cottage cheese, and eggs.  If you have trouble shopping for yourself  Ask a local food store to  "deliver groceries to your home.  Contact your local area agency on aging and ask about resources that can help.  Ask a family member or neighbor to help you.  If you have trouble preparing meals  Try easier cooking methods such as using a slow cooker or microwave oven.  Let the grocery store do some of the work for you. Look for precut, washed, and ready-to-eat foods.  Take part in group meal programs. You can find these through senior citizen programs.  Have meals brought to your home. Your community may offer programs that deliver meals, such as Meals on Wheels. Or you could use an online meal delivery service.  If you are able, take a cooking class.  If your appetite is poor  Try to eat meals on a regular schedule. It may help to eat smaller meals more often throughout the day.  If you can, eat some meals with other people. You could ask family or friends to eat with you. Or you could take part in group meal programs offered in your community.  Ask your doctor if your medicines could cause appetite or taste problems. If so, ask about changing medicines.  Add spices and herbs to increase the flavor of food.  If you think you are depressed, ask your doctor for help. Depression can affect your appetite. And it can make it hard to do everyday activities like grocery shopping and making meals. Treatment can help.  When should you call for help?  Watch closely for changes in your health, and be sure to contact your doctor if you have any problems.  Where can you learn more?  Go to https://www.healthELVPHD.net/patiented  Enter L643 in the search box to learn more about \"Nutrition for Older Adults: Care Instructions.\"  Current as of: September 25, 2023               Content Version: 14.0    5129-0177 [employer].   Care instructions adapted under license by your healthcare professional. If you have questions about a medical condition or this instruction, always ask your healthcare professional. [employer] disclaims any warranty or liability for your use of this information.      Preventing Falls: Care Instructions  Injuries and health problems such as trouble walking or poor eyesight can increase your risk of falling. So can some medicines. But there are things you can do to help prevent falls. You can exercise to get stronger. You can also arrange your home to make it safer.    Talk to your doctor about the medicines you take. Ask if any of them increase the risk of falls and whether they can be changed or stopped.   Try to exercise regularly. It can help improve your strength and balance. This can help lower your risk of falling.     Practice fall safety and prevention.    Wear low-heeled shoes that fit well and give your feet good support. Talk to your doctor if you have foot problems that make this hard.  Carry a cellphone or wear a medical alert device that you can use to call for help.  Use stepladders instead of chairs to reach high objects. Don't climb if you're at risk for falls. Ask for help, if needed.  Wear the correct eyeglasses, if you need them.    Make your home safer.    Remove rugs, cords, clutter, and furniture from walkways.  Keep your house well lit. Use night-lights in hallways and bathrooms.  Install and use sturdy handrails on stairways.  Wear nonskid footwear, even inside. Don't walk barefoot or in socks without shoes.    Be safe outside.    Use handrails, curb cuts, and ramps whenever possible.  Keep your hands free by using a shoulder bag or backpack.  Try to walk in well-lit areas. Watch out for uneven ground, changes in pavement, and debris.  Be careful in the winter. Walk on the grass or gravel when sidewalks are slippery. Use de-icer on steps and walkways. Add non-slip devices to shoes.    Put grab bars and nonskid mats in your shower or tub and near the toilet. Try to use a shower chair or bath bench when bathing.   Get into a tub or shower by putting in your weaker leg  "first. Get out with your strong side first. Have a phone or medical alert device in the bathroom with you.   Where can you learn more?  Go to https://www.SpotRight.net/patiented  Enter G117 in the search box to learn more about \"Preventing Falls: Care Instructions.\"  Current as of: July 17, 2023               Content Version: 14.0    4085-8016 [employer].   Care instructions adapted under license by your healthcare professional. If you have questions about a medical condition or this instruction, always ask your healthcare professional. [employer] disclaims any warranty or liability for your use of this information.      Hearing Loss: Care Instructions  Overview     Hearing loss is a sudden or slow decrease in how well you hear. It can range from slight to profound. Permanent hearing loss can occur with aging. It also can happen when you are exposed long-term to loud noise. Examples include listening to loud music, riding motorcycles, or being around other loud machines.  Hearing loss can affect your work and home life. It can make you feel lonely or depressed. You may feel that you have lost your independence. But hearing aids and other devices can help you hear better and feel connected to others.  Follow-up care is a key part of your treatment and safety. Be sure to make and go to all appointments, and call your doctor if you are having problems. It's also a good idea to know your test results and keep a list of the medicines you take.  How can you care for yourself at home?  Avoid loud noises whenever possible. This helps keep your hearing from getting worse.  Always wear hearing protection around loud noises.  Wear a hearing aid as directed.  A professional can help you pick a hearing aid that will work best for you.  You can also get hearing aids over the counter for mild to moderate hearing loss.  Have hearing tests as your doctor suggests. They can show whether your hearing has " "changed. Your hearing aid may need to be adjusted.  Use other devices as needed. These may include:  Telephone amplifiers and hearing aids that can connect to a television, stereo, radio, or microphone.  Devices that use lights or vibrations. These alert you to the doorbell, a ringing telephone, or a baby monitor.  Television closed-captioning. This shows the words at the bottom of the screen. Most new TVs can do this.  TTY (text telephone). This lets you type messages back and forth on the telephone instead of talking or listening. These devices are also called TDD. When messages are typed on the keyboard, they are sent over the phone line to a receiving TTY. The message is shown on a monitor.  Use text messaging, social media, and email if it is hard for you to communicate by telephone.  Try to learn a listening technique called speechreading. It is not lipreading. You pay attention to people's gestures, expressions, posture, and tone of voice. These clues can help you understand what a person is saying. Face the person you are talking to, and have them face you. Make sure the lighting is good. You need to see the other person's face clearly.  Think about counseling if you need help to adjust to your hearing loss.  When should you call for help?  Watch closely for changes in your health, and be sure to contact your doctor if:    You think your hearing is getting worse.     You have new symptoms, such as dizziness or nausea.   Where can you learn more?  Go to https://www.[employer]inex.net/patiented  Enter R798 in the search box to learn more about \"Hearing Loss: Care Instructions.\"  Current as of: September 27, 2023               Content Version: 14.0    5361-8045 [employer].   Care instructions adapted under license by your healthcare professional. If you have questions about a medical condition or this instruction, always ask your healthcare professional. [employer] disclaims any warranty " or liability for your use of this information.      Learning About Stress  What is stress?     Stress is your body's response to a hard situation. Your body can have a physical, emotional, or mental response. Stress is a fact of life for most people, and it affects everyone differently. What causes stress for you may not be stressful for someone else.  A lot of things can cause stress. You may feel stress when you go on a job interview, take a test, or run a race. This kind of short-term stress is normal and even useful. It can help you if you need to work hard or react quickly. For example, stress can help you finish an important job on time.  Long-term stress is caused by ongoing stressful situations or events. Examples of long-term stress include long-term health problems, ongoing problems at work, or conflicts in your family. Long-term stress can harm your health.  How does stress affect your health?  When you are stressed, your body responds as though you are in danger. It makes hormones that speed up your heart, make you breathe faster, and give you a burst of energy. This is called the fight-or-flight stress response. If the stress is over quickly, your body goes back to normal and no harm is done.  But if stress happens too often or lasts too long, it can have bad effects. Long-term stress can make you more likely to get sick, and it can make symptoms of some diseases worse. If you tense up when you are stressed, you may develop neck, shoulder, or low back pain. Stress is linked to high blood pressure and heart disease.  Stress also harms your emotional health. It can make you raymond, tense, or depressed. Your relationships may suffer, and you may not do well at work or school.  What can you do to manage stress?  You can try these things to help manage stress:   Do something active. Exercise or activity can help reduce stress. Walking is a great way to get started. Even everyday activities such as  housecleaning or yard work can help.  Try yoga or jamaal chi. These techniques combine exercise and meditation. You may need some training at first to learn them.  Do something you enjoy. For example, listen to music or go to a movie. Practice your hobby or do volunteer work.  Meditate. This can help you relax, because you are not worrying about what happened before or what may happen in the future.  Do guided imagery. Imagine yourself in any setting that helps you feel calm. You can use online videos, books, or a teacher to guide you.  Do breathing exercises. For example:  From a standing position, bend forward from the waist with your knees slightly bent. Let your arms dangle close to the floor.  Breathe in slowly and deeply as you return to a standing position. Roll up slowly and lift your head last.  Hold your breath for just a few seconds in the standing position.  Breathe out slowly and bend forward from the waist.  Let your feelings out. Talk, laugh, cry, and express anger when you need to. Talking with supportive friends or family, a counselor, or a parker leader about your feelings is a healthy way to relieve stress. Avoid discussing your feelings with people who make you feel worse.  Write. It may help to write about things that are bothering you. This helps you find out how much stress you feel and what is causing it. When you know this, you can find better ways to cope.  What can you do to prevent stress?  You might try some of these things to help prevent stress:  Manage your time. This helps you find time to do the things you want and need to do.  Get enough sleep. Your body recovers from the stresses of the day while you are sleeping.  Get support. Your family, friends, and community can make a difference in how you experience stress.  Limit your news feed. Avoid or limit time on social media or news that may make you feel stressed.  Do something active. Exercise or activity can help reduce stress.  "Walking is a great way to get started.  Where can you learn more?  Go to https://www.Pix4D.net/patiented  Enter N032 in the search box to learn more about \"Learning About Stress.\"  Current as of: October 24, 2023               Content Version: 14.0    0575-2771 [employer].   Care instructions adapted under license by your healthcare professional. If you have questions about a medical condition or this instruction, always ask your healthcare professional. [employer] disclaims any warranty or liability for your use of this information.      Learning About Depression Screening  What is depression screening?  Depression screening is a way to see if you have depression symptoms. It may be done by a doctor or counselor. It's often part of a routine checkup. That's because your mental health is just as important as your physical health.  Depression is a mental health condition that affects how you feel, think, and act. You may:  Have less energy.  Lose interest in your daily activities.  Feel sad and grouchy for a long time.  Depression is very common. It affects people of all ages.  Many things can lead to depression. Some people become depressed after they have a stroke or find out they have a major illness like cancer or heart disease. The death of a loved one or a breakup may lead to depression. It can run in families. Most experts believe that a combination of inherited genes and stressful life events can cause it.  What happens during screening?  You may be asked to fill out a form about your depression symptoms. You and the doctor will discuss your answers. The doctor may ask you more questions to learn more about how you think, act, and feel.  What happens after screening?  If you have symptoms of depression, your doctor will talk to you about your options.  Doctors usually treat depression with medicines or counseling. Often, combining the two works best. Many people don't get help " "because they think that they'll get over the depression on their own. But people with depression may not get better unless they get treatment.  The cause of depression is not well understood. There may be many factors involved. But if you have depression, it's not your fault.  A serious symptom of depression is thinking about death or suicide. If you or someone you care about talks about this or about feeling hopeless, get help right away.  It's important to know that depression can be treated. Medicine, counseling, and self-care may help.  Where can you learn more?  Go to https://www.[employer].net/patiented  Enter T185 in the search box to learn more about \"Learning About Depression Screening.\"  Current as of: June 24, 2023               Content Version: 14.0    1459-4116 [employer]   Care instructions adapted under license by your healthcare professional. If you have questions about a medical condition or this instruction, always ask your healthcare professional. [employer] disclaims any warranty or liability for your use of this information.      Substance Use Disorder: Care Instructions  Overview     You can improve your life and health by stopping your use of alcohol or drugs. When you don't drink or use drugs, you may feel and sleep better. You may get along better with your family, friends, and coworkers. There are medicines and programs that can help with substance use disorder.  How can you care for yourself at home?  Here are some ways to help you stay sober and prevent relapse.  If you have been given medicine to help keep you sober or reduce your cravings, be sure to take it exactly as prescribed.  Talk to your doctor about programs that can help you stop using drugs or drinking alcohol.  Do not keep alcohol or drugs in your home.  Plan ahead. Think about what you'll say if other people ask you to drink or use drugs. Try not to spend time with people who drink or use " drugs.  Use the time and money spent on drinking or drugs to do something that's important to you.  Preventing a relapse  Have a plan to deal with relapse. Learn to recognize changes in your thinking that lead you to drink or use drugs. Get help before you start to drink or use drugs again.  Try to stay away from situations, friends, or places that may lead you to drink or use drugs.  If you feel the need to drink alcohol or use drugs again, seek help right away. Call a trusted friend or family member. Some people get support from organizations such as Narcotics Anonymous or [employer] or from treatment facilities.  If you relapse, get help as soon as you can. Some people make a plan with another person that outlines what they want that person to do for them if they relapse. The plan usually includes how to handle the relapse and who to notify in case of relapse.  Don't give up. Remember that a relapse doesn't mean that you have failed. Use the experience to learn the triggers that lead you to drink or use drugs. Then quit again. Recovery is a lifelong process. Many people have several relapses before they are able to quit for good.  Follow-up care is a key part of your treatment and safety. Be sure to make and go to all appointments, and call your doctor if you are having problems. It's also a good idea to know your test results and keep a list of the medicines you take.  When should you call for help?   Call 911  anytime you think you may need emergency care. For example, call if you or someone else:    Has overdosed or has withdrawal signs. Be sure to tell the emergency workers that you are or someone else is using or trying to quit using drugs. Overdose or withdrawal signs may include:  Losing consciousness.  Seizure.  Seeing or hearing things that aren't there (hallucinations).     Is thinking or talking about suicide or harming others.   Where to get help 24 hours a day, 7 days a week   If you or someone  "you know talks about suicide, self-harm, a mental health crisis, a substance use crisis, or any other kind of emotional distress, get help right away. You can:    Call the Suicide and Crisis Lifeline at 988.     Call 3-019-954-TALK (1-247.811.9496).     Text HOME to 685621 to access the Crisis Text Line.   Consider saving these numbers in your phone.  Go to [employer] for more information or to chat online.  Call your doctor now or seek immediate medical care if:    You are having withdrawal symptoms. These may include nausea or vomiting, sweating, shakiness, and anxiety.   Watch closely for changes in your health, and be sure to contact your doctor if:    You have a relapse.     You need more help or support to stop.   Where can you learn more?  Go to https://www.[employer].net/patiented  Enter H573 in the search box to learn more about \"Substance Use Disorder: Care Instructions.\"  Current as of: November 15, 2023               Content Version: 14.0    [id number] [employer].   Care instructions adapted under license by your healthcare professional. If you have questions about a medical condition or this instruction, always ask your healthcare professional. [employer] disclaims any warranty or liability for your use of this information.      "

## 2024-08-20 NOTE — PROGRESS NOTES
ENT Consultation    Drake Santiago who is a 73 year old male seen in consultation at the request of audiologist.      History of Present Illness - Drake Santiago is a 73 year old male presents for evaluation of dizziness off-balance sensation.  This been ongoing for the last several years.  Patient has seen neurologist previously had MRI was negative for any specific pathology.  He may have a little bit of positional vertigo symptoms when he turns fast.  A lot of his symptoms also is when he looks up.  He denies any cervical issues such as cervical pain any neurologic symptoms involving his neck or upper extremities or any history of cervical disc disease.    MRI OF THE BRAIN WITHOUT CONTRAST October 4, 2023 2:02 PM      HISTORY: Memory loss. Imbalance.      TECHNIQUE: Sagittal T1-weighted, axial T2-weighted, axial FLAIR, and  axial diffusion-weighted MR images of the brain were acquired without  intravenous contrast.     COMPARISON: Brain MRI 4/2/2018.     FINDINGS: The ventricles and basal cisterns are within normal limits  in configuration. There is no midline shift. There are no extra-axial  fluid collections. There is no evidence for acute stroke or for acute  intracranial hemorrhage. Gray-white differentiation is well  maintained.     There is no sinusitis or mastoiditis.                                                                      IMPRESSION: Normal brain MRI.  BP Readings from Last 1 Encounters:   09/05/24 132/72       BP noted to be well controlled today in office.     Drake IS NOT a smoker/uses chewing tobacco.  Patient already quit smoking.      Past Medical History -   Past Medical History:   Diagnosis Date    BPH (benign prostatic hyperplasia)     Depressive disorder     Hyperlipidemia        Current Medications -   Current Outpatient Medications:     albuterol (PROAIR HFA/PROVENTIL HFA/VENTOLIN HFA) 108 (90 Base) MCG/ACT inhaler, Inhale 1-2 puffs into the lungs every 6 hours as needed  for shortness of breath, wheezing or cough, Disp: 18 g, Rfl: 3    atorvastatin (LIPITOR) 20 MG tablet, Take 1 tablet (20 mg) by mouth daily, Disp: 90 tablet, Rfl: 4    citalopram (CELEXA) 40 MG tablet, Take 1 tablet (40 mg) by mouth daily, Disp: 90 tablet, Rfl: 4    cyanocobalamin (VITAMIN B-12) 1000 MCG tablet, Take 1 tablet (1,000 mcg) by mouth daily, Disp: 90 tablet, Rfl: 3    DIPHENHYDRAMINE HCL PO, Take 50 mg by mouth nightly as needed, Disp: , Rfl:     finasteride (PROSCAR) 5 MG tablet, Take 1 tablet (5 mg) by mouth daily, Disp: 90 tablet, Rfl: 3    IBUPROFEN PO, , Disp: , Rfl:     sildenafil (VIAGRA) 100 MG tablet, Take 1 tablet (100 mg) by mouth daily as needed, Disp: 12 tablet, Rfl: 1    tamsulosin (FLOMAX) 0.4 MG capsule, Take 1 capsule (0.4 mg) by mouth 2 times daily, Disp: 180 capsule, Rfl: 3    traZODone (DESYREL) 50 MG tablet, Take 1 tablet (50 mg) by mouth at bedtime, Disp: 90 tablet, Rfl: 3  No current facility-administered medications for this visit.    Allergies -   Allergies   Allergen Reactions    Bees Anaphylaxis    Penicillins Anaphylaxis       Social History -   Social History     Socioeconomic History    Marital status: Single   Tobacco Use    Smoking status: Former     Passive exposure: Never    Smokeless tobacco: Never   Vaping Use    Vaping status: Never Used   Substance and Sexual Activity    Alcohol use: Yes     Comment: glass of wine occasionally    Drug use: No    Sexual activity: Not Currently     Partners: Female     Social Determinants of Health     Financial Resource Strain: Low Risk  (4/22/2024)    Financial Resource Strain     Within the past 12 months, have you or your family members you live with been unable to get utilities (heat, electricity) when it was really needed?: No   Food Insecurity: Low Risk  (4/22/2024)    Food Insecurity     Within the past 12 months, did you worry that your food would run out before you got money to buy more?: No     Within the past 12 months, did  "the food you bought just not last and you didn t have money to get more?: No   Transportation Needs: Low Risk  (4/22/2024)    Transportation Needs     Within the past 12 months, has lack of transportation kept you from medical appointments, getting your medicines, non-medical meetings or appointments, work, or from getting things that you need?: No   Physical Activity: Insufficiently Active (4/22/2024)    Exercise Vital Sign     Days of Exercise per Week: 2 days     Minutes of Exercise per Session: 10 min   Stress: No Stress Concern Present (4/22/2024)    [employer] [place] [employer] - Occupational Stress Questionnaire     Feeling of Stress : Only a little   Social Connections: Unknown (4/22/2024)    Social Connection and Isolation Panel [NHANES]     Frequency of Social Gatherings with Friends and Family: Once a week   Interpersonal Safety: Low Risk  (4/25/2024)    Interpersonal Safety     Do you feel physically and emotionally safe where you currently live?: Yes     Within the past 12 months, have you been hit, slapped, kicked or otherwise physically hurt by someone?: No     Within the past 12 months, have you been humiliated or emotionally abused in other ways by your partner or ex-partner?: No   Housing Stability: Low Risk  (4/22/2024)    Housing Stability     Do you have housing? : Yes     Are you worried about losing your housing?: No       Family History -   Family History   Problem Relation Age of Onset    Cerebrovascular Disease Mother     Dementia Mother     Glaucoma No family hx of     Macular Degeneration No family hx of        Review of Systems - As per HPI and PMHx, otherwise review of system review of the head and neck negative. Otherwise 10+ review of system is negative    Physical Exam  /72   Temp 97.2  F (36.2  C) (Temporal)   Ht 1.778 m (5' 10\")   Wt 77.6 kg (171 lb 1.6 oz)   BMI 24.55 kg/m    BMI: Body mass index is 24.55 kg/m .    General - The patient is well nourished and " well developed, and appears to have good nutritional status.  Alert and oriented to person and place, answers questions and cooperates with examination appropriately.    SKIN - No suspicious lesions or rashes.  Respiration - No respiratory distress.  Head and Face - Normocephalic and atraumatic, with no gross asymmetry noted of the contour of the facial features.  The facial nerve is intact, with strong symmetric movements.    Voice and Breathing - The patient was breathing comfortably without the use of accessory muscles. The patients voice was clear and strong, and had appropriate pitch and quality.    Ears - Bilateral pinna and EACs with normal appearing overlying skin. Tympanic membrane intact with good mobility on pneumatic otoscopy bilaterally. Bony landmarks of the ossicular chain are normal. The tympanic membranes are normal in appearance. No retraction, perforation, or masses.  No fluid or purulence was seen in the external canal or the middle ear.     Eyes - Extraocular movements intact.  Sclera were not icteric or injected, conjunctiva were pink and moist.    Mouth - Examination of the oral cavity showed pink, healthy oral mucosa. No lesions or ulcerations noted.  The tongue was mobile and midline, and the dentition were in good condition.      Throat - The walls of the oropharynx were smooth, pink, moist, symmetric, and had no lesions or ulcerations.  The tonsillar pillars and soft palate were symmetric.  The uvula was midline on elevation.    Neck - Normal midline excursion of the laryngotracheal complex during swallowing.  Full range of motion on passive movement.  Palpation of the occipital, submental, submandibular, internal jugular chain, and supraclavicular nodes did not demonstrate any abnormal lymph nodes or masses.  The carotid pulse was palpable bilaterally.  Palpation of the thyroid was soft and smooth, with no nodules or goiter appreciated.  The trachea was mobile and midline.    Nose -  External contour is symmetric, no gross deflection or scars.  Nasal mucosa is pink and moist with no abnormal mucus.  The septum was midline and non-obstructive, turbinates of normal size and position.  No polyps, masses, or purulence noted on examination.    Neuro - Nonfocal neuro exam is normal, CN 2 through 12 intact, normal gait and muscle tone.  However on Romberg with eyes closed he was very very unstable falling backwards.  No spontaneous nystagmus noted.      Performed in clinic in April of this year:  Audiologic Studies - An audiogram and tympanogram were performed today as part of the evaluation and personally reviewed. The tympanogram shows Type A curves on the right and Type A curves on the left, with normal canal volumes and middle ear pressures.  The audiogram showed mild to moderate SNHL on the right and mild to severe SNHL on the left.        A/P - Drake Santiago is a 73 year old male patient with significant disequilibrium dizziness.  He may have some component of benign positional vertigo.  Further evaluation is appropriate.  He tried physical therapy and Epley maneuver which was not very helpful.  Therefore we will send him to the [facility] for further evaluation.  Also would like to get CT angiography of his neck vasculature to make sure that the vertebral basilar system is patent.  Patient will follow-up in a couple months.      Joseph Perkins MD

## 2024-09-05 ENCOUNTER — LAB (OUTPATIENT)
Dept: LAB | Facility: CLINIC | Age: 74
End: 2024-09-05
Payer: COMMERCIAL

## 2024-09-05 ENCOUNTER — OFFICE VISIT (OUTPATIENT)
Dept: OTOLARYNGOLOGY | Facility: CLINIC | Age: 74
End: 2024-09-05
Payer: COMMERCIAL

## 2024-09-05 ENCOUNTER — HOSPITAL ENCOUNTER (OUTPATIENT)
Dept: CT IMAGING | Facility: CLINIC | Age: 74
Discharge: HOME | End: 2024-09-05
Attending: FAMILY MEDICINE | Admitting: FAMILY MEDICINE
Payer: COMMERCIAL

## 2024-09-05 VITALS
DIASTOLIC BLOOD PRESSURE: 72 MMHG | HEIGHT: 70 IN | TEMPERATURE: 97.2 F | WEIGHT: 171.1 LBS | SYSTOLIC BLOOD PRESSURE: 132 MMHG | BODY MASS INDEX: 24.5 KG/M2

## 2024-09-05 DIAGNOSIS — R42 DYSEQUILIBRIUM: ICD-10-CM

## 2024-09-05 DIAGNOSIS — E78.5 HYPERLIPIDEMIA LDL GOAL <130: ICD-10-CM

## 2024-09-05 DIAGNOSIS — R63.4 WEIGHT LOSS: ICD-10-CM

## 2024-09-05 DIAGNOSIS — Z11.59 NEED FOR HEPATITIS C SCREENING TEST: ICD-10-CM

## 2024-09-05 DIAGNOSIS — R42 DIZZINESS: Primary | ICD-10-CM

## 2024-09-05 DIAGNOSIS — R63.0 ANOREXIA: ICD-10-CM

## 2024-09-05 LAB
CHOLEST SERPL-MCNC: 171 MG/DL
CREAT BLD-MCNC: 1 MG/DL (ref 0.7–1.3)
EGFRCR SERPLBLD CKD-EPI 2021: >60 ML/MIN/1.73M2
FASTING STATUS PATIENT QL REPORTED: YES
HCV AB SERPL QL IA: NONREACTIVE
HDLC SERPL-MCNC: 62 MG/DL
LDLC SERPL CALC-MCNC: 80 MG/DL
NONHDLC SERPL-MCNC: 109 MG/DL
TRIGL SERPL-MCNC: 147 MG/DL

## 2024-09-05 PROCEDURE — 36415 COLL VENOUS BLD VENIPUNCTURE: CPT

## 2024-09-05 PROCEDURE — 250N000011 HC RX IP 250 OP 636: Performed by: FAMILY MEDICINE

## 2024-09-05 PROCEDURE — 80061 LIPID PANEL: CPT

## 2024-09-05 PROCEDURE — 86803 HEPATITIS C AB TEST: CPT

## 2024-09-05 PROCEDURE — 250N000009 HC RX 250: Performed by: FAMILY MEDICINE

## 2024-09-05 PROCEDURE — 82565 ASSAY OF CREATININE: CPT

## 2024-09-05 PROCEDURE — 99203 OFFICE O/P NEW LOW 30 MIN: CPT | Performed by: OTOLARYNGOLOGY

## 2024-09-05 PROCEDURE — 74177 CT ABD & PELVIS W/CONTRAST: CPT

## 2024-09-05 RX ORDER — IOPAMIDOL 755 MG/ML
500 INJECTION, SOLUTION INTRAVASCULAR ONCE
Status: COMPLETED | OUTPATIENT
Start: 2024-09-05 | End: 2024-09-05

## 2024-09-05 RX ADMIN — IOPAMIDOL 85 ML: 755 INJECTION, SOLUTION INTRAVENOUS at 09:13

## 2024-09-05 RX ADMIN — SODIUM CHLORIDE 60 ML: 9 INJECTION, SOLUTION INTRAVENOUS at 09:13

## 2024-09-05 NOTE — LETTER
9/5/2024      Drake Santiago  [address] [facility] [address]      Dear Colleague,    Thank you for referring your patient, Drake Santiago, to the [facility]. Please see a copy of my visit note below.    ENT Consultation    Drake Santiago who is a 73 year old male seen in consultation at the request of audiologist.      History of Present Illness - Drake Santiago is a 73 year old male presents for evaluation of dizziness off-balance sensation.  This been ongoing for the last several years.  Patient has seen neurologist previously had MRI was negative for any specific pathology.  He may have a little bit of positional vertigo symptoms when he turns fast.  A lot of his symptoms also is when he looks up.  He denies any cervical issues such as cervical pain any neurologic symptoms involving his neck or upper extremities or any history of cervical disc disease.    MRI OF THE BRAIN WITHOUT CONTRAST October 4, 2023 2:02 PM      HISTORY: Memory loss. Imbalance.      TECHNIQUE: Sagittal T1-weighted, axial T2-weighted, axial FLAIR, and  axial diffusion-weighted MR images of the brain were acquired without  intravenous contrast.     COMPARISON: Brain MRI 4/2/2018.     FINDINGS: The ventricles and basal cisterns are within normal limits  in configuration. There is no midline shift. There are no extra-axial  fluid collections. There is no evidence for acute stroke or for acute  intracranial hemorrhage. Gray-white differentiation is well  maintained.     There is no sinusitis or mastoiditis.                                                                      IMPRESSION: Normal brain MRI.  BP Readings from Last 1 Encounters:   09/05/24 132/72       BP noted to be well controlled today in office.     Drake IS NOT a smoker/uses chewing tobacco.  Patient already quit smoking.      Past Medical History -   Past Medical History:   Diagnosis Date     BPH (benign prostatic hyperplasia)       Depressive disorder      Hyperlipidemia        Current Medications -   Current Outpatient Medications:      albuterol (PROAIR HFA/PROVENTIL HFA/VENTOLIN HFA) 108 (90 Base) MCG/ACT inhaler, Inhale 1-2 puffs into the lungs every 6 hours as needed for shortness of breath, wheezing or cough, Disp: 18 g, Rfl: 3     atorvastatin (LIPITOR) 20 MG tablet, Take 1 tablet (20 mg) by mouth daily, Disp: 90 tablet, Rfl: 4     citalopram (CELEXA) 40 MG tablet, Take 1 tablet (40 mg) by mouth daily, Disp: 90 tablet, Rfl: 4     cyanocobalamin (VITAMIN B-12) 1000 MCG tablet, Take 1 tablet (1,000 mcg) by mouth daily, Disp: 90 tablet, Rfl: 3     DIPHENHYDRAMINE HCL PO, Take 50 mg by mouth nightly as needed, Disp: , Rfl:      finasteride (PROSCAR) 5 MG tablet, Take 1 tablet (5 mg) by mouth daily, Disp: 90 tablet, Rfl: 3     IBUPROFEN PO, , Disp: , Rfl:      sildenafil (VIAGRA) 100 MG tablet, Take 1 tablet (100 mg) by mouth daily as needed, Disp: 12 tablet, Rfl: 1     tamsulosin (FLOMAX) 0.4 MG capsule, Take 1 capsule (0.4 mg) by mouth 2 times daily, Disp: 180 capsule, Rfl: 3     traZODone (DESYREL) 50 MG tablet, Take 1 tablet (50 mg) by mouth at bedtime, Disp: 90 tablet, Rfl: 3  No current facility-administered medications for this visit.    Allergies -   Allergies   Allergen Reactions     Bees Anaphylaxis     Penicillins Anaphylaxis       Social History -   Social History     Socioeconomic History     Marital status: Single   Tobacco Use     Smoking status: Former     Passive exposure: Never     Smokeless tobacco: Never   Vaping Use     Vaping status: Never Used   Substance and Sexual Activity     Alcohol use: Yes     Comment: glass of wine occasionally     Drug use: No     Sexual activity: Not Currently     Partners: Female     Social Determinants of Health     Financial Resource Strain: Low Risk  (4/22/2024)    Financial Resource Strain      Within the past 12 months, have you or your family members you live with been unable to get  utilities (heat, electricity) when it was really needed?: No   Food Insecurity: Low Risk  (4/22/2024)    Food Insecurity      Within the past 12 months, did you worry that your food would run out before you got money to buy more?: No      Within the past 12 months, did the food you bought just not last and you didn t have money to get more?: No   Transportation Needs: Low Risk  (4/22/2024)    Transportation Needs      Within the past 12 months, has lack of transportation kept you from medical appointments, getting your medicines, non-medical meetings or appointments, work, or from getting things that you need?: No   Physical Activity: Insufficiently Active (4/22/2024)    Exercise Vital Sign      Days of Exercise per Week: 2 days      Minutes of Exercise per Session: 10 min   Stress: No Stress Concern Present (4/22/2024)    [employer] [place] [employer] - Occupational Stress Questionnaire      Feeling of Stress : Only a little   Social Connections: Unknown (4/22/2024)    Social Connection and Isolation Panel [NHANES]      Frequency of Social Gatherings with Friends and Family: Once a week   Interpersonal Safety: Low Risk  (4/25/2024)    Interpersonal Safety      Do you feel physically and emotionally safe where you currently live?: Yes      Within the past 12 months, have you been hit, slapped, kicked or otherwise physically hurt by someone?: No      Within the past 12 months, have you been humiliated or emotionally abused in other ways by your partner or ex-partner?: No   Housing Stability: Low Risk  (4/22/2024)    Housing Stability      Do you have housing? : Yes      Are you worried about losing your housing?: No       Family History -   Family History   Problem Relation Age of Onset     Cerebrovascular Disease Mother      Dementia Mother      Glaucoma No family hx of      Macular Degeneration No family hx of        Review of Systems - As per HPI and PMHx, otherwise review of system review of the head  "and neck negative. Otherwise 10+ review of system is negative    Physical Exam  /72   Temp 97.2  F (36.2  C) (Temporal)   Ht 1.778 m (5' 10\")   Wt 77.6 kg (171 lb 1.6 oz)   BMI 24.55 kg/m    BMI: Body mass index is 24.55 kg/m .    General - The patient is well nourished and well developed, and appears to have good nutritional status.  Alert and oriented to person and place, answers questions and cooperates with examination appropriately.    SKIN - No suspicious lesions or rashes.  Respiration - No respiratory distress.  Head and Face - Normocephalic and atraumatic, with no gross asymmetry noted of the contour of the facial features.  The facial nerve is intact, with strong symmetric movements.    Voice and Breathing - The patient was breathing comfortably without the use of accessory muscles. The patients voice was clear and strong, and had appropriate pitch and quality.    Ears - Bilateral pinna and EACs with normal appearing overlying skin. Tympanic membrane intact with good mobility on pneumatic otoscopy bilaterally. Bony landmarks of the ossicular chain are normal. The tympanic membranes are normal in appearance. No retraction, perforation, or masses.  No fluid or purulence was seen in the external canal or the middle ear.     Eyes - Extraocular movements intact.  Sclera were not icteric or injected, conjunctiva were pink and moist.    Mouth - Examination of the oral cavity showed pink, healthy oral mucosa. No lesions or ulcerations noted.  The tongue was mobile and midline, and the dentition were in good condition.      Throat - The walls of the oropharynx were smooth, pink, moist, symmetric, and had no lesions or ulcerations.  The tonsillar pillars and soft palate were symmetric.  The uvula was midline on elevation.    Neck - Normal midline excursion of the laryngotracheal complex during swallowing.  Full range of motion on passive movement.  Palpation of the occipital, submental, submandibular, " internal jugular chain, and supraclavicular nodes did not demonstrate any abnormal lymph nodes or masses.  The carotid pulse was palpable bilaterally.  Palpation of the thyroid was soft and smooth, with no nodules or goiter appreciated.  The trachea was mobile and midline.    Nose - External contour is symmetric, no gross deflection or scars.  Nasal mucosa is pink and moist with no abnormal mucus.  The septum was midline and non-obstructive, turbinates of normal size and position.  No polyps, masses, or purulence noted on examination.    Neuro - Nonfocal neuro exam is normal, CN 2 through 12 intact, normal gait and muscle tone.  However on Romberg with eyes closed he was very very unstable falling backwards.  No spontaneous nystagmus noted.      Performed in clinic in April of this year:  Audiologic Studies - An audiogram and tympanogram were performed today as part of the evaluation and personally reviewed. The tympanogram shows Type A curves on the right and Type A curves on the left, with normal canal volumes and middle ear pressures.  The audiogram showed mild to moderate SNHL on the right and mild to severe SNHL on the left.        A/P - Drake Santiago is a 73 year old male patient with significant disequilibrium dizziness.  He may have some component of benign positional vertigo.  Further evaluation is appropriate.  He tried physical therapy and Epley maneuver which was not very helpful.  Therefore we will send him to the [facility] for further evaluation.  Also would like to get CT angiography of his neck vasculature to make sure that the vertebral basilar system is patent.  Patient will follow-up in a couple months.      Joseph Perkins MD       Again, thank you for allowing me to participate in the care of your patient.        Sincerely,        Joseph Perkins MD, MD

## 2024-09-06 ENCOUNTER — TRANSCRIBE ORDERS (OUTPATIENT)
Dept: OTHER | Age: 74
End: 2024-09-06

## 2024-09-06 DIAGNOSIS — H02.839 DERMATOCHALASIS: ICD-10-CM

## 2024-09-06 DIAGNOSIS — H02.109 ECTROPION: Primary | ICD-10-CM

## 2024-09-06 DIAGNOSIS — R41.3 MEMORY LOSS: ICD-10-CM

## 2024-09-06 RX ORDER — LANOLIN ALCOHOL/MO/W.PET/CERES
1000 CREAM (GRAM) TOPICAL DAILY
Qty: 90 TABLET | Refills: 3 | Status: SHIPPED | OUTPATIENT
Start: 2024-09-06

## 2024-09-06 NOTE — TELEPHONE ENCOUNTER
Pending Prescriptions:                       Disp   Refills    cyanocobalamin (VITAMIN B-12) 1000 MCG ta*90 tab*3            Sig: Take 1 tablet (1,000 mcg) by mouth daily.       Requested Pharmacy: [id number] - DARI[facility] BAUMAN - [address] [place] [address]      Pt's last office visit: 10/03/2023  Next scheduled office visit: 11/06/2024      Per the RN/LPN medication refill protocol, writer is unable to refill this request.

## 2024-09-13 ENCOUNTER — HOSPITAL ENCOUNTER (OUTPATIENT)
Dept: CT IMAGING | Facility: CLINIC | Age: 74
Discharge: HOME | End: 2024-09-13
Attending: OTOLARYNGOLOGY | Admitting: OTOLARYNGOLOGY
Payer: COMMERCIAL

## 2024-09-13 DIAGNOSIS — R42 DIZZINESS: ICD-10-CM

## 2024-09-13 DIAGNOSIS — R42 DYSEQUILIBRIUM: ICD-10-CM

## 2024-09-13 PROCEDURE — 70496 CT ANGIOGRAPHY HEAD: CPT

## 2024-09-13 PROCEDURE — 250N000009 HC RX 250: Performed by: OTOLARYNGOLOGY

## 2024-09-13 PROCEDURE — 250N000011 HC RX IP 250 OP 636: Performed by: OTOLARYNGOLOGY

## 2024-09-13 RX ORDER — IOPAMIDOL 755 MG/ML
500 INJECTION, SOLUTION INTRAVASCULAR ONCE
Status: COMPLETED | OUTPATIENT
Start: 2024-09-13 | End: 2024-09-13

## 2024-09-13 RX ADMIN — IOPAMIDOL 67 ML: 755 INJECTION, SOLUTION INTRAVENOUS at 15:52

## 2024-09-13 RX ADMIN — SODIUM CHLORIDE 100 ML: 9 INJECTION, SOLUTION INTRAVENOUS at 15:52

## 2024-12-06 ENCOUNTER — TRANSFERRED RECORDS (OUTPATIENT)
Dept: HEALTH INFORMATION MANAGEMENT | Facility: CLINIC | Age: 74
End: 2024-12-06
Payer: COMMERCIAL

## 2025-03-17 DIAGNOSIS — F33.0 MAJOR DEPRESSIVE DISORDER, RECURRENT EPISODE, MILD: ICD-10-CM

## 2025-03-17 DIAGNOSIS — N40.1 BENIGN PROSTATIC HYPERPLASIA WITH LOWER URINARY TRACT SYMPTOMS, SYMPTOM DETAILS UNSPECIFIED: ICD-10-CM

## 2025-03-17 DIAGNOSIS — N52.9 ERECTILE DYSFUNCTION, UNSPECIFIED ERECTILE DYSFUNCTION TYPE: ICD-10-CM

## 2025-03-17 DIAGNOSIS — R41.3 MEMORY LOSS: ICD-10-CM

## 2025-03-17 DIAGNOSIS — R35.0 BENIGN PROSTATIC HYPERPLASIA WITH URINARY FREQUENCY: ICD-10-CM

## 2025-03-17 DIAGNOSIS — E78.5 HYPERLIPIDEMIA LDL GOAL <130: ICD-10-CM

## 2025-03-17 DIAGNOSIS — N40.1 BENIGN PROSTATIC HYPERPLASIA WITH URINARY FREQUENCY: ICD-10-CM

## 2025-03-17 DIAGNOSIS — R05.2 SUBACUTE COUGH: ICD-10-CM

## 2025-03-17 DIAGNOSIS — G47.00 INSOMNIA, UNSPECIFIED TYPE: ICD-10-CM

## 2025-03-17 NOTE — TELEPHONE ENCOUNTER
Drake stated he moved to [place].  He stated he needed to find a primary provider where he lives.

## 2025-03-19 RX ORDER — ATORVASTATIN CALCIUM 20 MG/1
20 TABLET, FILM COATED ORAL DAILY
Qty: 90 TABLET | Refills: 0 | Status: SHIPPED | OUTPATIENT
Start: 2025-03-19

## 2025-03-19 RX ORDER — TRAZODONE HYDROCHLORIDE 50 MG/1
50 TABLET ORAL AT BEDTIME
Qty: 90 TABLET | Refills: 3 | Status: SHIPPED | OUTPATIENT
Start: 2025-03-19

## 2025-03-19 RX ORDER — LANOLIN ALCOHOL/MO/W.PET/CERES
1000 CREAM (GRAM) TOPICAL DAILY
Qty: 90 TABLET | Refills: 1 | Status: SHIPPED | OUTPATIENT
Start: 2025-03-19

## 2025-03-19 RX ORDER — ALBUTEROL SULFATE 90 UG/1
1-2 INHALANT RESPIRATORY (INHALATION) EVERY 6 HOURS PRN
Qty: 18 G | Refills: 1 | Status: SHIPPED | OUTPATIENT
Start: 2025-03-19

## 2025-03-19 RX ORDER — TAMSULOSIN HYDROCHLORIDE 0.4 MG/1
0.4 CAPSULE ORAL 2 TIMES DAILY
Qty: 180 CAPSULE | Refills: 3 | Status: SHIPPED | OUTPATIENT
Start: 2025-03-19

## 2025-03-19 RX ORDER — CITALOPRAM HYDROBROMIDE 40 MG/1
40 TABLET ORAL DAILY
Qty: 90 TABLET | Refills: 0 | Status: SHIPPED | OUTPATIENT
Start: 2025-03-19

## 2025-03-19 RX ORDER — FINASTERIDE 5 MG/1
1 TABLET, FILM COATED ORAL DAILY
Qty: 90 TABLET | Refills: 0 | Status: SHIPPED | OUTPATIENT
Start: 2025-03-19

## 2025-03-19 RX ORDER — SILDENAFIL 100 MG/1
100 TABLET, FILM COATED ORAL DAILY PRN
Qty: 12 TABLET | Refills: 0 | Status: SHIPPED | OUTPATIENT
Start: 2025-03-19

## 2025-06-07 ENCOUNTER — HEALTH MAINTENANCE LETTER (OUTPATIENT)
Age: 75
End: 2025-06-07

## 2025-06-12 ENCOUNTER — TRANSFERRED RECORDS (OUTPATIENT)
Dept: HEALTH INFORMATION MANAGEMENT | Facility: CLINIC | Age: 75
End: 2025-06-12
Payer: COMMERCIAL

## 2025-06-24 ENCOUNTER — TELEPHONE (OUTPATIENT)
Dept: FAMILY MEDICINE | Facility: OTHER | Age: 75
End: 2025-06-24
Payer: COMMERCIAL

## 2025-06-24 NOTE — TELEPHONE ENCOUNTER
Patient Quality Outreach    Patient is due for the following:   Physical Annual Wellness Visit    Action(s) Taken:   Schedule a Annual Wellness Visit    Type of outreach:    Sent [employer] message.    Questions for provider review:    None         Randee Wade CMA  Chart routed to None.

## (undated) DEVICE — SOL NACL 0.9% IRRIG 1000ML BOTTLE 2F7124

## (undated) DEVICE — SPONGE COTTONOID 1/2X3" 80-1407

## (undated) DEVICE — ESU CORD BIPOLAR GREEN 10-4000

## (undated) DEVICE — LINEN TOWEL PACK X5 5464

## (undated) DEVICE — PEN MARKING SKIN VISIMARK 1424SR

## (undated) DEVICE — ESU PENCIL W/HOLSTER

## (undated) DEVICE — PEN MARKING SKIN W/PAPER RULER 31145785

## (undated) DEVICE — NDL 30GA 0.5" 305106

## (undated) DEVICE — PACK MINOR EYE CUSTOM ASC

## (undated) DEVICE — ESU EYE HIGH TEMP 65410-183

## (undated) DEVICE — PAD ARMBOARD FOAM EGGCRATE COVIDEN 3114367

## (undated) DEVICE — EYE PREP BETADINE 5% SOLUTION 30ML 0065-0411-30

## (undated) DEVICE — GLOVE PROTEXIS MICRO 7.5  2D73PM75

## (undated) DEVICE — SYR 03ML LL W/O NDL 309657

## (undated) DEVICE — SU ETHILON 5-0 P-3 18" BLACK 698G

## (undated) RX ORDER — HYDROCODONE BITARTRATE AND ACETAMINOPHEN 5; 325 MG/1; MG/1
TABLET ORAL
Status: DISPENSED
Start: 2017-06-05

## (undated) RX ORDER — LIDOCAINE HYDROCHLORIDE 10 MG/ML
INJECTION, SOLUTION EPIDURAL; INFILTRATION; INTRACAUDAL; PERINEURAL
Status: DISPENSED
Start: 2017-06-05

## (undated) RX ORDER — ACETAMINOPHEN 500 MG
TABLET ORAL
Status: DISPENSED
Start: 2017-06-05

## (undated) RX ORDER — PROPOFOL 10 MG/ML
INJECTION, EMULSION INTRAVENOUS
Status: DISPENSED
Start: 2017-06-05

## (undated) RX ORDER — ONDANSETRON 2 MG/ML
INJECTION INTRAMUSCULAR; INTRAVENOUS
Status: DISPENSED
Start: 2017-06-05